# Patient Record
Sex: FEMALE | Race: WHITE | NOT HISPANIC OR LATINO | Employment: FULL TIME | URBAN - METROPOLITAN AREA
[De-identification: names, ages, dates, MRNs, and addresses within clinical notes are randomized per-mention and may not be internally consistent; named-entity substitution may affect disease eponyms.]

---

## 2017-10-02 ENCOUNTER — OFFICE VISIT (OUTPATIENT)
Dept: FAMILY MEDICINE | Facility: CLINIC | Age: 52
End: 2017-10-02
Payer: COMMERCIAL

## 2017-10-02 ENCOUNTER — HOSPITAL ENCOUNTER (OUTPATIENT)
Dept: RADIOLOGY | Facility: HOSPITAL | Age: 52
Discharge: HOME | End: 2017-10-02
Attending: NURSE PRACTITIONER
Payer: COMMERCIAL

## 2017-10-02 VITALS
DIASTOLIC BLOOD PRESSURE: 74 MMHG | HEIGHT: 59 IN | WEIGHT: 84.44 LBS | TEMPERATURE: 98 F | BODY MASS INDEX: 17.02 KG/M2 | SYSTOLIC BLOOD PRESSURE: 104 MMHG
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
## 2017-10-02 DIAGNOSIS — M79.644 PAIN OF RIGHT MIDDLE FINGER: ICD-10-CM

## 2017-10-02 DIAGNOSIS — S61.259A CAT BITE OF FINGER, INITIAL ENCOUNTER: ICD-10-CM

## 2017-10-02 DIAGNOSIS — S61.259A CAT BITE OF FINGER, INITIAL ENCOUNTER: Primary | ICD-10-CM

## 2017-10-02 PROBLEM — I10 HYPERTENSION: Status: ACTIVE | Noted: 2017-10-02

## 2017-10-02 PROBLEM — I25.2 MYOCARDIAL INFARCT, OLD: Status: ACTIVE | Noted: 2017-10-02

## 2017-10-02 PROCEDURE — 90471 IMMUNIZATION ADMIN: CPT | Mod: S$GLB,,, | Performed by: NURSE PRACTITIONER

## 2017-10-02 PROCEDURE — 73130 X-RAY EXAM OF HAND: CPT | Mod: TC,PO,RT

## 2017-10-02 PROCEDURE — 3008F BODY MASS INDEX DOCD: CPT | Mod: S$GLB,,, | Performed by: NURSE PRACTITIONER

## 2017-10-02 PROCEDURE — 99204 OFFICE O/P NEW MOD 45 MIN: CPT | Mod: 25,S$GLB,, | Performed by: NURSE PRACTITIONER

## 2017-10-02 PROCEDURE — 90715 TDAP VACCINE 7 YRS/> IM: CPT | Mod: S$GLB,,, | Performed by: NURSE PRACTITIONER

## 2017-10-02 PROCEDURE — 99999 PR PBB SHADOW E&M-NEW PATIENT-LVL IV: CPT | Mod: PBBFAC,,, | Performed by: NURSE PRACTITIONER

## 2017-10-02 PROCEDURE — 73130 X-RAY EXAM OF HAND: CPT | Mod: 26,RT,, | Performed by: RADIOLOGY

## 2017-10-02 RX ORDER — ATORVASTATIN CALCIUM 80 MG/1
TABLET, FILM COATED ORAL
Refills: 0 | COMMUNITY
Start: 2017-09-11 | End: 2018-02-01 | Stop reason: SDUPTHER

## 2017-10-02 RX ORDER — AMOXICILLIN AND CLAVULANATE POTASSIUM 875; 125 MG/1; MG/1
1 TABLET, FILM COATED ORAL EVERY 12 HOURS
Qty: 14 TABLET | Refills: 0 | Status: SHIPPED | OUTPATIENT
Start: 2017-10-02 | End: 2017-10-09

## 2017-10-02 RX ORDER — METOPROLOL SUCCINATE 25 MG/1
TABLET, EXTENDED RELEASE ORAL
Refills: 0 | COMMUNITY
Start: 2017-09-11 | End: 2018-02-01 | Stop reason: SDUPTHER

## 2017-10-02 RX ORDER — LANSOPRAZOLE 30 MG/1
CAPSULE, DELAYED RELEASE ORAL
Refills: 3 | COMMUNITY
Start: 2017-09-17 | End: 2018-02-01 | Stop reason: SDUPTHER

## 2017-10-02 RX ORDER — AMLODIPINE BESYLATE 5 MG/1
TABLET ORAL
Refills: 0 | COMMUNITY
Start: 2017-09-11 | End: 2018-02-01 | Stop reason: SDUPTHER

## 2017-10-02 NOTE — PATIENT INSTRUCTIONS
Cat Bite    A cat bite can cause a wound deep enough to break the skin. In such cases, the wound is cleaned and then sometimes closed. If the wound is closed it is usually not closed completely. This is so that fluid can drain if the wound becomes infected. Often the wound is left open to heal. In addition to wound care, a tetanus shot may be given, if needed.  Home care  · Wash your hands well with soap and warm water before and after caring for the wound. This helps lower the risk of infection.  · Care for the wound as directed. If a dressing was applied to the wound, be sure to change it as directed.  · If the wound bleeds, place a clean, soft cloth on the wound. Then firmly apply pressure until the bleeding stops. This may take up to 5 minutes. Do not release the pressure and look at the wound during this time.  · Always get medical attention for cat bites on the hand. They are highly likely to become infected.  · Most wounds heal within 10 days. But an infection can occur even with proper treatment. So be sure to check the wound daily for signs of infection (see below).  · Antibiotics may be prescribed. These help prevent or treat infection. If youre given antibiotics, take them as directed. Also be sure to complete the medicines.  Rabies prevention  Rabies is a virus that can be carried in certain animals. These can include domestic animals such as cats and dogs. Pets fully vaccinated against rabies (2 shots) are at very low risk of infection. But because human rabies is almost always fatal, any biting pet should be confined for 10 days as an extra precaution. In general, if there is a risk for rabies, the following steps may need to be taken:  · If someones pet cat has bitten you, it should be kept in a secure area for the next 10 days to watch for signs of illness. (If the pet owner wont allow this, contact your local animal control center.) If the cat becomes ill or dies during that time, contact your  local animal control center at once so the animal may be tested for rabies. If the cat stays healthy for the next 10 days, there is no danger of rabies in the animal or you.  · If a stray cat bit you, contact your local animal control center. They can give information on capture, quarantine, and animal rabies testing.  · If you cant find the animal that bit you in the next 2 days, and if rabies exists in your area, you may need to receive the rabies vaccine series. Call your healthcare provider right away. Or return to the emergency department promptly.  · All animal bites should be reported to the local animal control center. If you were not given a form to fill out, you can report this yourself.  Follow-up care  Follow up with your healthcare provider, or as directed.  When to seek medical advice  Call your healthcare provider right away if any of these occur:  · Signs of infection:  ¨ Spreading redness or warmth from the wound  ¨ Increased pain or swelling  ¨ Fever of 100.4ºF (38ºC) or higher, or as directed by your healthcare provider  ¨ Colored fluid or pus draining from the wound  ¨ Enlarged lymph nodes above the area that was bitten, such as lymph nodes in the armpit if you were bitten on the hand or arm. This may be a sign of cat-scratch disease (cat-scratch fever).  · Signs of rabies infection:  ¨ Headache  ¨ Confusion  ¨ Strange behavior  ¨ Increased salivating or drooling  ¨ Seizure  · Decreased ability to move any body part near the bite area  · Bleeding that can't be stopped after 5 minutes of firm pressure  Date Last Reviewed: 3/23/2015  © 3072-8728 [employer]. [address] [place] [address], [place] [address]. All rights reserved. This information is not intended as a substitute for professional medical care. Always follow your healthcare professional's instructions.      Encouraged to continue to use ibuprofen for discomfort as needed.   Continue to keep site clean.   Take antibiotic as  prescribed.  Return to clinic in 1 week for follow up or sooner if symptoms persist or worsen.   Will send xray results via patient portal.

## 2017-10-02 NOTE — PROGRESS NOTES
Subjective:       Patient ID: Dena Stanton is a 51 y.o. female.    New pt to me and [facility] services.   Reviewed medical, surgical, social, and family history.     Chief Complaint: Animal Bite  Pt reports her 4 month old baby kitten bite her right middle finger.  It happened either Thursday or Friday evening. Kitten has not been to the vet yet for any shots.  Pt reports, she has not been out with any other animals.  Kitten has been an inside cat her whole life.  Pt reports the dog was running through house and pt had the kitten in her hands and bit then ran to hide.  Pt reports she has been putting doctor tichenors and neosporin ointment to the area since it occurred. At 1 am she last took ibuprofen (2 tablets) 400mg.  Pt reports yesterday site was swollen and red. She noticed it was warm in the middle of the night. She also reports putting ice on the site last night.       Animal Bite    The incident occurred more than 2 days ago. The incident occurred at home. There is an injury to the right hand. There is an injury to the right long finger. The pain is mild. It is unlikely that a foreign body is present. Pertinent negatives include no chest pain, no numbness, no nausea, no vomiting and no cough. There have been no prior injuries to these areas. She is right-handed. Tetanus status: it'ds been a long time, probably hurracaine [place].        Vitals:    10/02/17 1008   BP: 104/74   Temp: 98.2 °F (36.8 °C)     Past Medical History:   Diagnosis Date    Hypertension     Myocardial infarction 2001     Review of Systems   Constitutional: Negative for chills and fever.   Respiratory: Negative for cough and shortness of breath.    Cardiovascular: Negative for chest pain.   Gastrointestinal: Negative for nausea and vomiting.   Musculoskeletal: Positive for joint swelling (right middle finger).   Skin: Positive for wound (2 puncture sites to underside of middle finger where kitten bite her. ).   Neurological: Negative  for numbness.               Objective:      Physical Exam   Constitutional: She is oriented to person, place, and time. Vital signs are normal. She appears well-developed and well-nourished. She is cooperative.   HENT:   Head: Normocephalic and atraumatic.   Right Ear: External ear normal.   Left Ear: External ear normal.   Ears:    Nose: Nose normal.   Mouth/Throat: Oropharynx is clear and moist and mucous membranes are normal.   Eyes: Conjunctivae, EOM and lids are normal. Pupils are equal, round, and reactive to light.   Neck: Normal range of motion. Neck supple.   Cardiovascular: Normal rate, regular rhythm and normal heart sounds.    Pulmonary/Chest: Effort normal and breath sounds normal.   Abdominal: Soft. Bowel sounds are normal.   Musculoskeletal: She exhibits edema (right middle finger) and tenderness (right middle finger).        Arms:       Right hand: She exhibits decreased range of motion, tenderness and swelling.        Hands:  Lymphadenopathy:     She has no cervical adenopathy.   Neurological: She is alert and oriented to person, place, and time.   Skin: Skin is warm and dry. Capillary refill takes less than 2 seconds.   Psychiatric: She has a normal mood and affect. Her speech is normal and behavior is normal. Judgment and thought content normal.   Nursing note and vitals reviewed.      Assessment & Plan:       Cat bite of finger, initial encounter  -     X-Ray Hand 3 view Right; Future; Expected date: 10/02/2017  -     amoxicillin-clavulanate 875-125mg (AUGMENTIN) 875-125 mg per tablet; Take 1 tablet by mouth every 12 (twelve) hours.  Dispense: 14 tablet; Refill: 0  -     Tdap Vaccine    Pain of right middle finger  -     X-Ray Hand 3 view Right; Future; Expected date: 10/02/2017  -     amoxicillin-clavulanate 875-125mg (AUGMENTIN) 875-125 mg per tablet; Take 1 tablet by mouth every 12 (twelve) hours.  Dispense: 14 tablet; Refill: 0    Encouraged to continue to use ibuprofen for discomfort as  needed.   Continue to keep site clean.   Take antibiotic as prescribed.  RTC in 1 week for follow up or sooner if symptoms persist or worsen.   Will send xray results via patient portal.   Pt verbalized understanding of plan of care      Return in about 1 week (around 10/9/2017).

## 2018-02-01 ENCOUNTER — OFFICE VISIT (OUTPATIENT)
Dept: FAMILY MEDICINE | Facility: CLINIC | Age: 53
End: 2018-02-01
Payer: COMMERCIAL

## 2018-02-01 ENCOUNTER — LAB VISIT (OUTPATIENT)
Dept: LAB | Facility: HOSPITAL | Age: 53
End: 2018-02-01
Attending: NURSE PRACTITIONER
Payer: COMMERCIAL

## 2018-02-01 VITALS
HEART RATE: 84 BPM | OXYGEN SATURATION: 97 % | SYSTOLIC BLOOD PRESSURE: 122 MMHG | HEIGHT: 59 IN | WEIGHT: 81.38 LBS | DIASTOLIC BLOOD PRESSURE: 82 MMHG | TEMPERATURE: 99 F | BODY MASS INDEX: 16.4 KG/M2

## 2018-02-01 DIAGNOSIS — F17.200 SMOKER: ICD-10-CM

## 2018-02-01 DIAGNOSIS — H61.23 BILATERAL IMPACTED CERUMEN: ICD-10-CM

## 2018-02-01 DIAGNOSIS — Z00.00 WELLNESS EXAMINATION: Primary | ICD-10-CM

## 2018-02-01 DIAGNOSIS — I10 ESSENTIAL HYPERTENSION: ICD-10-CM

## 2018-02-01 DIAGNOSIS — E78.5 HYPERLIPIDEMIA, UNSPECIFIED HYPERLIPIDEMIA TYPE: ICD-10-CM

## 2018-02-01 DIAGNOSIS — H91.93 DECREASED HEARING OF BOTH EARS: ICD-10-CM

## 2018-02-01 DIAGNOSIS — Z00.00 WELLNESS EXAMINATION: ICD-10-CM

## 2018-02-01 DIAGNOSIS — K21.9 GASTROESOPHAGEAL REFLUX DISEASE, ESOPHAGITIS PRESENCE NOT SPECIFIED: ICD-10-CM

## 2018-02-01 LAB
ALBUMIN SERPL BCP-MCNC: 3.8 G/DL
ALP SERPL-CCNC: 97 U/L
ALT SERPL W/O P-5'-P-CCNC: 24 U/L
ANION GAP SERPL CALC-SCNC: 10 MMOL/L
AST SERPL-CCNC: 67 U/L
BASOPHILS # BLD AUTO: 0.05 K/UL
BASOPHILS NFR BLD: 0.9 %
BILIRUB SERPL-MCNC: 0.7 MG/DL
BUN SERPL-MCNC: 11 MG/DL
CALCIUM SERPL-MCNC: 9 MG/DL
CHLORIDE SERPL-SCNC: 106 MMOL/L
CHOLEST SERPL-MCNC: 235 MG/DL
CHOLEST/HDLC SERPL: 2.2 {RATIO}
CO2 SERPL-SCNC: 26 MMOL/L
CREAT SERPL-MCNC: 0.6 MG/DL
DIFFERENTIAL METHOD: ABNORMAL
EOSINOPHIL # BLD AUTO: 0 K/UL
EOSINOPHIL NFR BLD: 0.7 %
ERYTHROCYTE [DISTWIDTH] IN BLOOD BY AUTOMATED COUNT: 13.2 %
EST. GFR  (AFRICAN AMERICAN): >60 ML/MIN/1.73 M^2
EST. GFR  (NON AFRICAN AMERICAN): >60 ML/MIN/1.73 M^2
GLUCOSE SERPL-MCNC: 83 MG/DL
HCT VFR BLD AUTO: 41.9 %
HDLC SERPL-MCNC: 105 MG/DL
HDLC SERPL: 44.7 %
HGB BLD-MCNC: 14.4 G/DL
IMM GRANULOCYTES # BLD AUTO: 0.01 K/UL
IMM GRANULOCYTES NFR BLD AUTO: 0.2 %
LDLC SERPL CALC-MCNC: 119.2 MG/DL
LYMPHOCYTES # BLD AUTO: 1.4 K/UL
LYMPHOCYTES NFR BLD: 23.8 %
MCH RBC QN AUTO: 33.6 PG
MCHC RBC AUTO-ENTMCNC: 34.4 G/DL
MCV RBC AUTO: 98 FL
MONOCYTES # BLD AUTO: 0.5 K/UL
MONOCYTES NFR BLD: 7.9 %
NEUTROPHILS # BLD AUTO: 3.8 K/UL
NEUTROPHILS NFR BLD: 66.5 %
NONHDLC SERPL-MCNC: 130 MG/DL
NRBC BLD-RTO: 0 /100 WBC
PLATELET # BLD AUTO: 271 K/UL
PMV BLD AUTO: 10.6 FL
POTASSIUM SERPL-SCNC: 4.3 MMOL/L
PROT SERPL-MCNC: 7.2 G/DL
RBC # BLD AUTO: 4.29 M/UL
SODIUM SERPL-SCNC: 142 MMOL/L
TRIGL SERPL-MCNC: 54 MG/DL
TSH SERPL DL<=0.005 MIU/L-ACNC: 1.99 UIU/ML
WBC # BLD AUTO: 5.72 K/UL

## 2018-02-01 PROCEDURE — 36415 COLL VENOUS BLD VENIPUNCTURE: CPT | Mod: PO

## 2018-02-01 PROCEDURE — 80053 COMPREHEN METABOLIC PANEL: CPT

## 2018-02-01 PROCEDURE — 99396 PREV VISIT EST AGE 40-64: CPT | Mod: S$GLB,,, | Performed by: NURSE PRACTITIONER

## 2018-02-01 PROCEDURE — 99999 PR PBB SHADOW E&M-EST. PATIENT-LVL V: CPT | Mod: PBBFAC,,, | Performed by: NURSE PRACTITIONER

## 2018-02-01 PROCEDURE — 85025 COMPLETE CBC W/AUTO DIFF WBC: CPT

## 2018-02-01 PROCEDURE — 80061 LIPID PANEL: CPT

## 2018-02-01 PROCEDURE — 84443 ASSAY THYROID STIM HORMONE: CPT

## 2018-02-01 RX ORDER — AMLODIPINE BESYLATE 5 MG/1
TABLET ORAL
Qty: 90 TABLET | Refills: 1 | Status: SHIPPED | OUTPATIENT
Start: 2018-02-01 | End: 2018-08-22 | Stop reason: SDUPTHER

## 2018-02-01 RX ORDER — METOPROLOL SUCCINATE 25 MG/1
TABLET, EXTENDED RELEASE ORAL
Qty: 90 TABLET | Refills: 1 | Status: SHIPPED | OUTPATIENT
Start: 2018-02-01 | End: 2018-08-22 | Stop reason: SDUPTHER

## 2018-02-01 RX ORDER — ATORVASTATIN CALCIUM 80 MG/1
TABLET, FILM COATED ORAL
Qty: 90 TABLET | Refills: 1 | Status: SHIPPED | OUTPATIENT
Start: 2018-02-01 | End: 2018-08-22 | Stop reason: SDUPTHER

## 2018-02-01 RX ORDER — LANSOPRAZOLE 30 MG/1
CAPSULE, DELAYED RELEASE ORAL
Qty: 90 CAPSULE | Refills: 1 | Status: SHIPPED | OUTPATIENT
Start: 2018-02-01 | End: 2018-10-18 | Stop reason: SDUPTHER

## 2018-02-01 NOTE — PROGRESS NOTES
Subjective:       Patient ID: Dena Stanton is a 52 y.o. female.  Reviewed pt's medical, surgical, family, and social hx.   Last seen on 10/2/2017.    Chief Complaint: Medication Refill (BP med)  Needs to establish care with PCP.  Here for med refill and labs. Pt reports her previous Md no longer practices.     HPI  Vitals:    02/01/18 1034   BP: 122/82   Pulse: 84   Temp: 98.6 °F (37 °C)     Review of Systems   Constitutional: Negative for activity change, chills, diaphoresis, fever and unexpected weight change.   HENT: Negative for congestion, ear discharge, ear pain, hearing loss, postnasal drip, rhinorrhea, sinus pain, sinus pressure, sneezing, sore throat, trouble swallowing and voice change.    Eyes: Negative for discharge and visual disturbance.   Respiratory: Negative for cough, chest tightness, shortness of breath and wheezing.         Smoker.   Cardiovascular: Negative for chest pain and palpitations.        Hx of HTN and Hyperlipidemia.   Pt reports had MI years ago.   Takes Norvasc, Toprol XL and Lipitor.   Gastrointestinal: Negative for abdominal distention, abdominal pain, blood in stool, constipation, diarrhea, nausea and vomiting.        Hx of GERD. Takes Prevacid.    Endocrine: Negative for polydipsia and polyuria.   Genitourinary: Negative for difficulty urinating, dysuria, hematuria and menstrual problem.   Musculoskeletal: Negative for arthralgias, joint swelling and neck pain.   Neurological: Negative for weakness and headaches.   Psychiatric/Behavioral: Negative for confusion and dysphoric mood.       Objective:      Physical Exam   Constitutional: She is oriented to person, place, and time. Vital signs are normal. She appears well-developed and well-nourished. She is cooperative.   HENT:   Head: Normocephalic and atraumatic.   Right Ear: External ear normal. Decreased hearing is noted.   Left Ear: External ear normal. Decreased hearing is noted.   Ears:    Nose: Nose normal.   Mouth/Throat:  Oropharynx is clear and moist and mucous membranes are normal.   Wears hearing aids bilaterally.   Eyes: Conjunctivae, EOM and lids are normal. Pupils are equal, round, and reactive to light.   Neck: Normal range of motion. Neck supple.   Cardiovascular: Normal rate, regular rhythm, S1 normal, S2 normal and normal heart sounds.    Pulmonary/Chest: Effort normal and breath sounds normal. No respiratory distress.   Abdominal: Soft. Bowel sounds are normal. She exhibits no distension.   Musculoskeletal: Normal range of motion.   Lymphadenopathy:        Head (right side): No submental, no submandibular, no tonsillar, no preauricular and no posterior auricular adenopathy present.        Head (left side): No submental, no submandibular, no tonsillar, no preauricular and no posterior auricular adenopathy present.   Neurological: She is alert and oriented to person, place, and time.   Skin: Skin is warm, dry and intact. Capillary refill takes less than 2 seconds.   Psychiatric: She has a normal mood and affect. Her speech is normal and behavior is normal. Judgment and thought content normal.   Nursing note and vitals reviewed.      Assessment & Plan:       Wellness examination  -     CBC auto differential; Future; Expected date: 02/01/2018  -     Lipid panel; Future; Expected date: 02/01/2018  -     TSH; Future; Expected date: 02/01/2018  -     Comprehensive metabolic panel; Future; Expected date: 02/01/2018    Essential hypertension  -     metoprolol succinate (TOPROL-XL) 25 MG 24 hr tablet; Take one tablet by mouth once a day.  Dispense: 90 tablet; Refill: 1  -     amLODIPine (NORVASC) 5 MG tablet; Take one tablet by mouth once a day.  Dispense: 90 tablet; Refill: 1    Gastroesophageal reflux disease, esophagitis presence not specified  -     lansoprazole (PREVACID) 30 MG capsule; Take one capsule by mouth once a day.  Dispense: 90 capsule; Refill: 1    Hyperlipidemia, unspecified hyperlipidemia type  -     atorvastatin  (LIPITOR) 80 MG tablet; Take one tablet by mouth every pm.  Dispense: 90 tablet; Refill: 1    Smoker  -     Ambulatory referral to Smoking Cessation Program  Pt wants help to quit smoking.     Bilateral impacted cerumen  -     Ambulatory referral to ENT    Decreased hearing of both ears  -     Ambulatory referral to ENT  Wears hearing aids bilaterally.     Reviewed health maintenance, pt declined flu vaccine, colonoscopy, GYN referral, pap smear, and mammogram at this time.  Encouraged to get labs today, will call pt with results.   Make ENT appointment.   Est. care with [facility] in [place], per pt request.       Follow-up if symptoms worsen or fail to improve.

## 2018-02-05 ENCOUNTER — TELEPHONE (OUTPATIENT)
Dept: FAMILY MEDICINE | Facility: CLINIC | Age: 53
End: 2018-02-05

## 2018-02-05 DIAGNOSIS — E78.5 HYPERLIPIDEMIA, UNSPECIFIED HYPERLIPIDEMIA TYPE: Primary | ICD-10-CM

## 2018-02-05 DIAGNOSIS — H91.90 HEARING DIFFICULTY, UNSPECIFIED LATERALITY: Primary | ICD-10-CM

## 2018-02-05 NOTE — TELEPHONE ENCOUNTER
Please notify pt of lab results, total cholesterol is elevated at 235, normal range is less than 199. Other labs are within acceptable range. Recommend lifestyle modification, diet, exercise, and smoking cessation. Continue current medications and repeat lipid panel in 2 months, order placed.

## 2018-02-06 ENCOUNTER — TELEPHONE (OUTPATIENT)
Dept: FAMILY MEDICINE | Facility: CLINIC | Age: 53
End: 2018-02-06

## 2018-02-06 NOTE — TELEPHONE ENCOUNTER
----- Message from Vandana Wong sent at 2/5/2018  4:21 PM CST -----  Contact: Patient  Dena, Patient [phone], Returning the nurse's call, lab test results. Please advise. Thanks.

## 2018-02-06 NOTE — TELEPHONE ENCOUNTER
----- Message from Vandana Wong sent at 2/5/2018  4:21 PM CST -----  Contact: Patient  Dena, Patient [phone], Returning the nurse's call, lab test results. Please advise. Thanks.

## 2018-02-07 ENCOUNTER — CLINICAL SUPPORT (OUTPATIENT)
Dept: AUDIOLOGY | Facility: CLINIC | Age: 53
End: 2018-02-07
Payer: COMMERCIAL

## 2018-02-07 ENCOUNTER — OFFICE VISIT (OUTPATIENT)
Dept: OTOLARYNGOLOGY | Facility: CLINIC | Age: 53
End: 2018-02-07
Payer: COMMERCIAL

## 2018-02-07 VITALS
HEART RATE: 80 BPM | WEIGHT: 81.38 LBS | HEIGHT: 59 IN | BODY MASS INDEX: 16.4 KG/M2 | SYSTOLIC BLOOD PRESSURE: 123 MMHG | DIASTOLIC BLOOD PRESSURE: 71 MMHG

## 2018-02-07 DIAGNOSIS — H90.0 CONDUCTIVE HEARING LOSS OF BOTH EARS: Primary | ICD-10-CM

## 2018-02-07 DIAGNOSIS — Z97.4 WEARS HEARING AID: ICD-10-CM

## 2018-02-07 DIAGNOSIS — H61.301 ACQUIRED STENOSIS OF RIGHT EXTERNAL EAR CANAL: ICD-10-CM

## 2018-02-07 DIAGNOSIS — H61.23 BILATERAL IMPACTED CERUMEN: ICD-10-CM

## 2018-02-07 DIAGNOSIS — H90.6 MIXED HEARING LOSS, BILATERAL: Primary | ICD-10-CM

## 2018-02-07 DIAGNOSIS — H74.03 TS (TYMPANOSCLEROSIS), BILATERAL: ICD-10-CM

## 2018-02-07 DIAGNOSIS — H93.13 TINNITUS, BILATERAL: ICD-10-CM

## 2018-02-07 PROCEDURE — G0268 REMOVAL OF IMPACTED WAX MD: HCPCS | Mod: S$GLB,,, | Performed by: NURSE PRACTITIONER

## 2018-02-07 PROCEDURE — 3008F BODY MASS INDEX DOCD: CPT | Mod: S$GLB,,, | Performed by: NURSE PRACTITIONER

## 2018-02-07 PROCEDURE — 99203 OFFICE O/P NEW LOW 30 MIN: CPT | Mod: 25,S$GLB,, | Performed by: NURSE PRACTITIONER

## 2018-02-07 PROCEDURE — 99999 PR PBB SHADOW E&M-EST. PATIENT-LVL III: CPT | Mod: PBBFAC,,, | Performed by: NURSE PRACTITIONER

## 2018-02-07 PROCEDURE — 99999 PR PBB SHADOW E&M-EST. PATIENT-LVL I: CPT | Mod: PBBFAC,,,

## 2018-02-07 PROCEDURE — 92567 TYMPANOMETRY: CPT | Mod: S$GLB,,, | Performed by: AUDIOLOGIST-HEARING AID FITTER

## 2018-02-07 PROCEDURE — 92557 COMPREHENSIVE HEARING TEST: CPT | Mod: S$GLB,,, | Performed by: AUDIOLOGIST-HEARING AID FITTER

## 2018-02-07 NOTE — PROGRESS NOTES
Dena Stanton was seen 02/07/2018 for an audiological evaluation. Patient complains of hearing loss. 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 She had a tympanoplasty in 1973. She began wearing hearing aids in 1991. Her grandmother gave her a set of hearing aids that were reprogrammed for her hearing loss but she is interested in getting new hearing aids. 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 She is also interested in discussing surgical options to determine if her hearing can be restored, at least partially.     Results reveal a severe-to-profound mixed hearing loss for the right ear, and  mild-to-moderately severe mixed hearing loss for the left ear.    Speech Reception Thresholds were  70 dBHL for the right ear and 35 dBHL for the left ear.    Word recognition scores were good for the right ear and good for the left ear.   Tympanograms were Type B for the right ear and Type B for the left ear.    Audiogram results were reviewed in detail with patient and all questions were answered. Results will be reviewed by ENT at the completion of this note. Recommend further medical eval for the asymmetry and air/bone gap, binaural amplification pending medical clearance, annual hearing tests to monitor hearing and hearing protection in loud noise. Pt is interested in getting new hearing aids and will call the clinic to schedule a HA consult.

## 2018-02-07 NOTE — LETTER
February 7, 2018      Angie Lazcano NP  [address] [facility] [address] [place] [address]           [facility]  [address] [facility] [address] [place] [address]  Phone: [phone]  Fax: [phone]          Patient: Dena Stanton   MR Number: [id number]   YOB: 1965   Date of Visit: 2/7/2018       Dear Angie Lazcano:    Thank you for referring Dena Stanton to me for evaluation. Attached you will find relevant portions of my assessment and plan of care.    If you have questions, please do not hesitate to call me. I look forward to following Dena Stanton along with you.    Sincerely,    Sharonda Wills NP    Enclosure  CC:  No Recipients    If you would like to receive this communication electronically, please contact [email or web address] or [phone] to request more information on [employer] [facility] access.    For providers and/or their staff who would like to refer a patient to [facility], please contact us through our one-stop-shop provider referral line, [facility] , at [phone].    If you feel you have received this communication in error or would no longer like to receive these types of communications, please e-mail [email or web address]

## 2018-02-07 NOTE — PROGRESS NOTES
Subjective:       Patient ID: Dena Stanton is a 52 y.o. female.    Chief Complaint: Cerumen Impaction and Hearing Loss    HPI   Patient is referred by DARIA Lazcano for consultation for hearing loss. Patient had lots of ear infections as a child, h/o perforated TMs, h/o tympanoplasties in 1973. Patient has worn hearing aids since 1991. H/o noise exposure (rock concerts). (+)Tinnitus. Significant family h/o HL. She works in a library, hard to hear patrons whisper. She is here to see about getting new hearing aids and to establish care. She is also interested if there is any surgery which would improve her hearing.     Review of Systems   Constitutional: Negative.    HENT: Positive for hearing loss and tinnitus.    Eyes: Negative.    Respiratory: Negative.    Cardiovascular: Negative.    Gastrointestinal: Negative.    Musculoskeletal: Negative.    Skin: Negative.    Neurological: Negative.    Hematological: Negative.    Psychiatric/Behavioral: Negative.        Objective:      Physical Exam   Constitutional: She is oriented to person, place, and time. Vital signs are normal. She appears well-developed and well-nourished. She is cooperative. She does not appear ill. No distress.   HENT:   Head: Normocephalic and atraumatic.   Right Ear: External ear and ear canal normal. No drainage, swelling or tenderness. Tympanic membrane is scarred (atresia/stenosis of EAC with no visible TM landmarks). Tympanic membrane is not erythematous. Decreased hearing is noted.   Left Ear: External ear and ear canal normal. No drainage, swelling or tenderness. Tympanic membrane is scarred. Tympanic membrane is not erythematous.  No middle ear effusion. Decreased hearing is noted.   Nose: Nose normal. No mucosal edema or rhinorrhea. Right sinus exhibits no maxillary sinus tenderness and no frontal sinus tenderness. Left sinus exhibits no maxillary sinus tenderness and no frontal sinus tenderness.   Mouth/Throat: Uvula is midline, oropharynx is  clear and moist and mucous membranes are normal. Mucous membranes are not pale, not dry and not cyanotic. No oral lesions. No oropharyngeal exudate, posterior oropharyngeal edema or posterior oropharyngeal erythema.     SEPARATE PROCEDURE IN OFFICE:   Procedure: Removal of impacted cerumen, bilateral   Pre Procedure Diagnosis: Cerumen Impaction   Post Procedure Diagnosis: Cerumen Impaction   Verbal informed consent in regards to risk of trauma to ear canal, ear drum or hearing, discomfort during procedure and/or inability to remove cerumen impaction in one session or unforeseen events or complications.   No anesthesia.     Procedure in detail:   Ear canal visualized bilateral with appropriate size ear speculum utilizing Operating Head Binocular Otomicroscope   Utilizing the following: Ring curet, delicate alligator forceps AU. The impacted cerumen of the ear canals was removed atraumatically. The TM and EAC were then inspected and found to be clear of wax. See description of TMs/EACs in PE above.   Complications: No   Condition: Improved/Good     Eyes: Conjunctivae, EOM and lids are normal. Pupils are equal, round, and reactive to light. Right eye exhibits no discharge. Left eye exhibits no discharge. No scleral icterus.   Neck: Trachea normal and normal range of motion. Neck supple. No tracheal deviation present. No thyroid mass and no thyromegaly present.   Cardiovascular: Normal rate.    Pulmonary/Chest: Effort normal. No stridor. No respiratory distress. She has no wheezes.   Musculoskeletal: Normal range of motion.   Lymphadenopathy:        Head (right side): No submental, no submandibular, no tonsillar, no preauricular and no posterior auricular adenopathy present.        Head (left side): No submental, no submandibular, no tonsillar, no preauricular and no posterior auricular adenopathy present.     She has no cervical adenopathy.        Right cervical: No superficial cervical and no posterior cervical  adenopathy present.       Left cervical: No superficial cervical and no posterior cervical adenopathy present.   Neurological: She is alert and oriented to person, place, and time. She has normal strength. Coordination and gait normal.   Skin: Skin is warm, dry and intact. No lesion and no rash noted. She is not diaphoretic. No cyanosis. No pallor.   Psychiatric: She has a normal mood and affect. Her speech is normal and behavior is normal. Judgment and thought content normal. Cognition and memory are normal.   Nursing note and vitals reviewed.      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 Referred to Dr. Hernandez for eval.     Continue daily use of binaural amplification.

## 2018-02-26 ENCOUNTER — OFFICE VISIT (OUTPATIENT)
Dept: FAMILY MEDICINE | Facility: CLINIC | Age: 53
End: 2018-02-26
Payer: COMMERCIAL

## 2018-02-26 VITALS
WEIGHT: 83.25 LBS | HEIGHT: 59 IN | DIASTOLIC BLOOD PRESSURE: 82 MMHG | BODY MASS INDEX: 16.78 KG/M2 | OXYGEN SATURATION: 98 % | SYSTOLIC BLOOD PRESSURE: 138 MMHG | TEMPERATURE: 99 F | HEART RATE: 82 BPM

## 2018-02-26 DIAGNOSIS — I10 ESSENTIAL HYPERTENSION: Primary | ICD-10-CM

## 2018-02-26 DIAGNOSIS — E78.5 HYPERLIPIDEMIA, UNSPECIFIED HYPERLIPIDEMIA TYPE: ICD-10-CM

## 2018-02-26 DIAGNOSIS — Z00.00 PREVENTATIVE HEALTH CARE: ICD-10-CM

## 2018-02-26 DIAGNOSIS — K21.9 GASTROESOPHAGEAL REFLUX DISEASE, ESOPHAGITIS PRESENCE NOT SPECIFIED: ICD-10-CM

## 2018-02-26 DIAGNOSIS — I25.2 MYOCARDIAL INFARCT, OLD: ICD-10-CM

## 2018-02-26 DIAGNOSIS — F17.200 SMOKER: ICD-10-CM

## 2018-02-26 PROCEDURE — 99999 PR PBB SHADOW E&M-EST. PATIENT-LVL III: CPT | Mod: PBBFAC,,, | Performed by: FAMILY MEDICINE

## 2018-02-26 PROCEDURE — 99214 OFFICE O/P EST MOD 30 MIN: CPT | Mod: S$GLB,,, | Performed by: FAMILY MEDICINE

## 2018-02-26 PROCEDURE — 3008F BODY MASS INDEX DOCD: CPT | Mod: S$GLB,,, | Performed by: FAMILY MEDICINE

## 2018-02-26 RX ORDER — EZETIMIBE 10 MG/1
10 TABLET ORAL DAILY
Qty: 90 TABLET | Refills: 3 | Status: SHIPPED | OUTPATIENT
Start: 2018-02-26 | End: 2018-08-22

## 2018-02-26 NOTE — LETTER
February 26, 2018      Angie Lazcano NP  [address] [facility] [address]  [place] [address]           [facility]  [address]  [place] [address]  Phone: [phone]  Fax: [phone]          Patient: Dena Stanton   MR Number: [id number]   YOB: 1965   Date of Visit: 2/26/2018       Dear Angie Lazcano:    Thank you for referring Dena Stanton to me for evaluation. Attached you will find relevant portions of my assessment and plan of care.    If you have questions, please do not hesitate to call me. I look forward to following Dena Stanton along with you.    Sincerely,    Rafael Masters MD    Enclosure  CC:  No Recipients    If you would like to receive this communication electronically, please contact [email or web address] or [phone] to request more information on [employer] Link access.    For providers and/or their staff who would like to refer a patient to [facility], please contact us through our one-stop-shop provider referral line, [facility], at [phone].    If you feel you have received this communication in error or would no longer like to receive these types of communications, please e-mail [email or web address]

## 2018-02-26 NOTE — PROGRESS NOTES
Subjective:     THIS DOCUMENT WAS MADE IN PART WITH [employer] DICTATION SOFTWARE. OCCASIONALLY THIS SOFTWARE MAY MISINTERPRET WORDS OR PHRASES.     Patient ID: Dena Stanton is a 52 y.o. female.    Chief Complaint: Establish Care    HPI      Patient is scheduled today to establish care. This patient is new to me. I have reviewed her chart and the summarized her active problems below. I reviewed her recent wellness exams and recent labs.    Essential hypertension   chronic condition, a little high today probably from anxiety. The last several readings have been very good and home readings have been good. She remains on amlodipine and metoprolol    Hyperlipidemia, unspecified hyperlipidemia type   Recent labs reviewed. She has excellent HDL, greater than 100. She also remains on Lipitor 80 mg daily   she also reports that she had been on Zetia as well but has not been on this recently when prescription ran out    Gastroesophageal reflux disease, esophagitis presence not specified  Stable on prevacid    Smoker  Would like patches and wellbutrin  Scheduled for clinic  Quit for 10 years after MI    Myocardial infarct, old  2001, angioplasty at that time   states her arteries were too small for a stent. Has had multiple stress test since that were normal. She does not have any active angina. Previously followed by Dr. Sepulveda      Active Ambulatory Problems     Diagnosis Date Noted    Hypertension 10/02/2017    Myocardial infarct, old 10/02/2017     Resolved Ambulatory Problems     Diagnosis Date Noted    Cat bite of finger 10/02/2017     Past Medical History:   Diagnosis Date    GERD (gastroesophageal reflux disease)     Hearing impaired person, bilateral     Hyperlipidemia     Hypertension     Myocardial infarction 2001     Current Outpatient Prescriptions on File Prior to Visit   Medication Sig Dispense Refill    amLODIPine (NORVASC) 5 MG tablet Take one tablet by mouth once a day. 90 tablet 1    atorvastatin  (LIPITOR) 80 MG tablet Take one tablet by mouth every pm. 90 tablet 1    lansoprazole (PREVACID) 30 MG capsule Take one capsule by mouth once a day. 90 capsule 1    metoprolol succinate (TOPROL-XL) 25 MG 24 hr tablet Take one tablet by mouth once a day. 90 tablet 1     No current facility-administered medications on file prior to visit.      Review of patient's allergies indicates:  No Known Allergies  Social History   Substance Use Topics    Smoking status: Current Every Day Smoker     Packs/day: 1.00     Years: 5.00     Types: Cigarettes    Smokeless tobacco: Never Used    Alcohol use 1.8 oz/week     3 Glasses of wine per week      Comment: pt states about 3 glasses of wine per week         Review of Systems   Constitutional: Negative for fatigue, fever and unexpected weight change.   HENT: Negative for sinus pressure and trouble swallowing.    Eyes: Negative for pain and visual disturbance.   Respiratory: Negative for cough, chest tightness and shortness of breath.    Cardiovascular: Negative for chest pain, palpitations and leg swelling.   Gastrointestinal: Negative for abdominal pain, blood in stool, constipation, diarrhea, nausea and vomiting.   Genitourinary: Negative for dysuria, frequency and hematuria.   Musculoskeletal: Negative for arthralgias, gait problem, myalgias and neck pain.   Skin: Negative for rash and wound.   Neurological: Negative for dizziness, tremors, syncope, numbness and headaches.   Psychiatric/Behavioral: Negative for dysphoric mood and sleep disturbance. The patient is not nervous/anxious.        Objective:      Physical Exam   Constitutional: She is oriented to person, place, and time. She appears well-developed and well-nourished. No distress.   HENT:   Head: Normocephalic and atraumatic.   Eyes: No scleral icterus.   Cardiovascular: Normal rate, regular rhythm and normal heart sounds.    No murmur heard.  Pulmonary/Chest: Effort normal and breath sounds normal. No respiratory  "distress.   Neurological: She is alert and oriented to person, place, and time.   Skin: She is not diaphoretic.   Psychiatric: She has a normal mood and affect. Her behavior is normal.   Vitals reviewed.      Vitals:    02/26/18 1409 02/26/18 1434   BP: (!) 154/84 138/82   BP Location: Left arm    Patient Position: Sitting    BP Method: Small (Manual)    Pulse: 82    Temp: 98.9 °F (37.2 °C)    TempSrc: Oral    SpO2: 98%    Weight: 37.8 kg (83 lb 3.6 oz)    Height: 4' 11" (1.499 m)      Results for orders placed or performed in visit on 02/01/18   CBC auto differential   Result Value Ref Range    WBC 5.72 3.90 - 12.70 K/uL    RBC 4.29 4.00 - 5.40 M/uL    Hemoglobin 14.4 12.0 - 16.0 g/dL    Hematocrit 41.9 37.0 - 48.5 %    MCV 98 82 - 98 fL    MCH 33.6 (H) 27.0 - 31.0 pg    MCHC 34.4 32.0 - 36.0 g/dL    RDW 13.2 11.5 - 14.5 %    Platelets 271 150 - 350 K/uL    MPV 10.6 9.2 - 12.9 fL    Immature Granulocytes 0.2 0.0 - 0.5 %    Gran # (ANC) 3.8 1.8 - 7.7 K/uL    Immature Grans (Abs) 0.01 0.00 - 0.04 K/uL    Lymph # 1.4 1.0 - 4.8 K/uL    Mono # 0.5 0.3 - 1.0 K/uL    Eos # 0.0 0.0 - 0.5 K/uL    Baso # 0.05 0.00 - 0.20 K/uL    nRBC 0 0 /100 WBC    Gran% 66.5 38.0 - 73.0 %    Lymph% 23.8 18.0 - 48.0 %    Mono% 7.9 4.0 - 15.0 %    Eosinophil% 0.7 0.0 - 8.0 %    Basophil% 0.9 0.0 - 1.9 %    Differential Method Automated    Lipid panel   Result Value Ref Range    Cholesterol 235 (H) 120 - 199 mg/dL    Triglycerides 54 30 - 150 mg/dL     (H) 40 - 75 mg/dL    LDL Cholesterol 119.2 63.0 - 159.0 mg/dL    HDL/Chol Ratio 44.7 20.0 - 50.0 %    Total Cholesterol/HDL Ratio 2.2 2.0 - 5.0    Non-HDL Cholesterol 130 mg/dL   TSH   Result Value Ref Range    TSH 1.986 0.400 - 4.000 uIU/mL   Comprehensive metabolic panel   Result Value Ref Range    Sodium 142 136 - 145 mmol/L    Potassium 4.3 3.5 - 5.1 mmol/L    Chloride 106 95 - 110 mmol/L    CO2 26 23 - 29 mmol/L    Glucose 83 70 - 110 mg/dL    BUN, Bld 11 6 - 20 mg/dL    Creatinine " 0.6 0.5 - 1.4 mg/dL    Calcium 9.0 8.7 - 10.5 mg/dL    Total Protein 7.2 6.0 - 8.4 g/dL    Albumin 3.8 3.5 - 5.2 g/dL    Total Bilirubin 0.7 0.1 - 1.0 mg/dL    Alkaline Phosphatase 97 55 - 135 U/L    AST 67 (H) 10 - 40 U/L    ALT 24 10 - 44 U/L    Anion Gap 10 8 - 16 mmol/L    eGFR if African American >60.0 >60 mL/min/1.73 m^2    eGFR if non African American >60.0 >60 mL/min/1.73 m^2       Assessment:       1. Essential hypertension    2. Hyperlipidemia, unspecified hyperlipidemia type    3. Gastroesophageal reflux disease, esophagitis presence not specified    4. Smoker    5. Myocardial infarct, old    6. Preventative health care        Plan:       Dena was seen today for establish care.    Diagnoses and all orders for this visit:    Essential hypertension  -     Comprehensive metabolic panel; Future    Hyperlipidemia, unspecified hyperlipidemia type  -     ezetimibe (ZETIA) 10 mg tablet; Take 1 tablet (10 mg total) by mouth once daily.  -     Lipid panel; Future    Gastroesophageal reflux disease, esophagitis presence not specified   Stable, symptoms currently controlled. Recommend that she take a magnesium supplement periodically as she is on a PPI. Should consider an EGD at some point, but today she's not ready to schedule any endoscopies for this or for colon cancer screening    Smoker   She is ready to quit smoking and has an appointment with the smoking cessation clinic    Myocardial infarct, old   reviewed history, no active symptoms.    Preventative health care  -     Comprehensive metabolic panel; Future  -     Lipid panel; Future  -     Fecal Immunochemical Test (iFOBT); Future     she declines colonoscopy. She declines most vaccinations, I have reviewed her recent notes.        if stable follow with me in six months

## 2018-02-26 NOTE — PATIENT INSTRUCTIONS
Vit D 1000 IU daily  Diet rich in calcium  Magnesium supplement 200 mg few times a week    Fit Kit instructions:    1.Verify that your name and date of birth are correct on the label.    2. Place either the collection paper inside the toilet bowl on top of the water OR if supplied with a white specimen hat, place that to the rear of the toilet.    2. Open the green cap by lifting and twisting off the collection bottle.    3. Collect the stool off the paper before paper sinks or the sample gets wet with the green stick probe (from the collection bottle) by scraping the surface.  If using the specimen hat, collect the sample using the same method.  MAKE SURE THE GROOVED PORTION OF THE STICK IS COMPLETELY COVERED WITH THE STOOL SAMPLE.    4. Close the sample bottle by placing the green stick probe, that you collected the sample with in to to bottle that you removed it from.   Make sure the cap is on tightly.  DO NOT REOPEN.    5. Wrap the sample bottle in the absorbent pad and place it in the plastic bag provided in the package.    6. Mailing instructions:   A. Make sure that you write the stool collection date on the paperwork before placing it in to the envelope provided with the plastic bag that contains the stool sample.      B. Fold the flap at the pre-folded line.     C. Peel the tape from the flap.     D. Press firmly to seal the envelope.      E. MAIL AS SOON AS POSSIBLE AND WITHIN 24 HOURS OR THE SAMPLE     WILL NOT BE ABLE TO BE PROCESSED.

## 2018-03-01 ENCOUNTER — CLINICAL SUPPORT (OUTPATIENT)
Dept: SMOKING CESSATION | Facility: CLINIC | Age: 53
End: 2018-03-01
Payer: COMMERCIAL

## 2018-03-01 DIAGNOSIS — F17.210 MODERATE SMOKER (20 OR LESS PER DAY): Primary | ICD-10-CM

## 2018-03-01 PROCEDURE — 99999 PR PBB SHADOW E&M-EST. PATIENT-LVL II: CPT | Mod: PBBFAC,,,

## 2018-03-01 PROCEDURE — 99404 PREV MED CNSL INDIV APPRX 60: CPT | Mod: S$GLB,,, | Performed by: GENERAL PRACTICE

## 2018-03-01 RX ORDER — IBUPROFEN 200 MG
1 TABLET ORAL DAILY
Qty: 14 PATCH | Refills: 0 | Status: SHIPPED | OUTPATIENT
Start: 2018-03-01 | End: 2018-03-15 | Stop reason: DRUGHIGH

## 2018-03-01 RX ORDER — BUPROPION HYDROCHLORIDE 150 MG/1
TABLET, EXTENDED RELEASE ORAL
Qty: 53 TABLET | Refills: 0 | Status: SHIPPED | OUTPATIENT
Start: 2018-03-01 | End: 2018-03-29 | Stop reason: DRUGHIGH

## 2018-03-08 ENCOUNTER — CLINICAL SUPPORT (OUTPATIENT)
Dept: SMOKING CESSATION | Facility: CLINIC | Age: 53
End: 2018-03-08
Payer: COMMERCIAL

## 2018-03-08 DIAGNOSIS — F17.210 MODERATE SMOKER (20 OR LESS PER DAY): Primary | ICD-10-CM

## 2018-03-08 PROCEDURE — 99403 PREV MED CNSL INDIV APPRX 45: CPT | Mod: S$GLB,,, | Performed by: GENERAL PRACTICE

## 2018-03-08 PROCEDURE — 99999 PR PBB SHADOW E&M-EST. PATIENT-LVL II: CPT | Mod: PBBFAC,,,

## 2018-03-08 NOTE — Clinical Note
Patient is currently smoking 10 cigarettes per day and taking Wellbutrin and using the 21 mg patch with no negative side effects at this time. We discussed habitual behaviors at this time as well as session 1 material. Patient finds Wellbutrin and patches very effective.

## 2018-03-08 NOTE — PROGRESS NOTES
Individual Follow-Up Form    3/8/2018    Quit Date:     Clinical Status of Patient: Outpatient    Length of Service: 45 minutes    Continuing Medication: yes  Wellbutrin    Other Medications: patches     Target Symptoms: Withdrawal and medication side effects. The following were  rated moderate (3) to severe (4) on TCRS:  · Moderate (3): none  · Severe (4): none    Comments: Patient is currently smoking 10 cigarettes per day and taking Wellbutrin and using the 21 mg patch with no negative side effects at this time. We discussed habitual behaviors at this time as well as session 1 material. Patient finds Wellbutrin and patches very effective.     Diagnosis: F17.210    Next Visit: 1 week

## 2018-03-15 ENCOUNTER — CLINICAL SUPPORT (OUTPATIENT)
Dept: SMOKING CESSATION | Facility: CLINIC | Age: 53
End: 2018-03-15
Payer: COMMERCIAL

## 2018-03-15 DIAGNOSIS — F17.210 MODERATE SMOKER (20 OR LESS PER DAY): Primary | ICD-10-CM

## 2018-03-15 PROCEDURE — 99403 PREV MED CNSL INDIV APPRX 45: CPT | Mod: S$GLB,,, | Performed by: GENERAL PRACTICE

## 2018-03-15 PROCEDURE — 99999 PR PBB SHADOW E&M-EST. PATIENT-LVL II: CPT | Mod: PBBFAC,,,

## 2018-03-15 RX ORDER — IBUPROFEN 200 MG
1 TABLET ORAL DAILY
Qty: 14 PATCH | Refills: 0 | Status: SHIPPED | OUTPATIENT
Start: 2018-03-15 | End: 2018-03-29 | Stop reason: SDUPTHER

## 2018-03-15 NOTE — Clinical Note
Patient is currently smoking 1 cigarette per day and taking Wellbutrin and using a 21 mg patch with no negative side effects at this time. Patient had issues sleeping due to leaving patch on at night but will take it off before bed. Patient states she no longer craves nicotine and is very pleased with Wellbutrin and patches. I will order the 14 mg patch at this time. We discussed health and medications as well as side effects. We also discussed habitual behaviors at this time.

## 2018-03-15 NOTE — PROGRESS NOTES
Individual Follow-Up Form    3/15/2018    Quit Date:     Clinical Status of Patient: Outpatient    Length of Service: 45 minutes    Continuing Medication: yes  Wellbutrin    Other Medications: patches     Target Symptoms: Withdrawal and medication side effects. The following were  rated moderate (3) to severe (4) on TCRS:  · Moderate (3): none  · Severe (4): none    Comments: Patient is currently smoking 1 cigarette per day and taking Wellbutrin and using a 21 mg patch with no negative side effects at this time. Patient had issues sleeping due to leaving patch on at night but will take it off before bed. Patient states she no longer craves nicotine and is very pleased with Wellbutrin and patches. I will order the 14 mg patch at this time. We discussed health and medications as well as side effects. We also discussed habitual behaviors at this time.    Diagnosis: F17.200    Next Visit: 2 weeks

## 2018-03-27 ENCOUNTER — TELEPHONE (OUTPATIENT)
Dept: SMOKING CESSATION | Facility: CLINIC | Age: 53
End: 2018-03-27

## 2018-03-27 NOTE — TELEPHONE ENCOUNTER
Patient left me message that she has not taken Wellbutrin or used patches in a few days as she is not feeling well and is currently tobacco free and not craving nicotine. I will meet with patient 3/29/18.

## 2018-03-29 ENCOUNTER — CLINICAL SUPPORT (OUTPATIENT)
Dept: SMOKING CESSATION | Facility: CLINIC | Age: 53
End: 2018-03-29
Payer: COMMERCIAL

## 2018-03-29 DIAGNOSIS — F17.200 TOBACCO USE DISORDER: Primary | ICD-10-CM

## 2018-03-29 DIAGNOSIS — F17.210 MODERATE SMOKER (20 OR LESS PER DAY): ICD-10-CM

## 2018-03-29 PROCEDURE — 99403 PREV MED CNSL INDIV APPRX 45: CPT | Mod: S$GLB,,, | Performed by: GENERAL PRACTICE

## 2018-03-29 PROCEDURE — 99999 PR PBB SHADOW E&M-EST. PATIENT-LVL II: CPT | Mod: PBBFAC,,,

## 2018-03-29 RX ORDER — IBUPROFEN 200 MG
1 TABLET ORAL DAILY
Qty: 14 PATCH | Refills: 0 | Status: SHIPPED | OUTPATIENT
Start: 2018-03-29 | End: 2018-04-17

## 2018-03-29 RX ORDER — BUPROPION HYDROCHLORIDE 150 MG/1
150 TABLET, EXTENDED RELEASE ORAL 2 TIMES DAILY
Qty: 60 TABLET | Refills: 0 | Status: SHIPPED | OUTPATIENT
Start: 2018-03-29 | End: 2018-04-17

## 2018-03-29 NOTE — Clinical Note
Patient remains tobacco free and taking Wellbutrin and using a 14 mg patch with no negative side effects at this time. Patient taking 150 mg QD due to loss of appetite. Patient states Wellbutrin and patches are very helpful as she has no urges or cravings for nicotine. We discussed session 3 material at this time.

## 2018-03-29 NOTE — PROGRESS NOTES
Individual Follow-Up Form    3/29/2018    Quit Date: 3/17/18    Clinical Status of Patient: Outpatient    Length of Service: 45 minutes    Continuing Medication: yes  Wellbutrin    Other Medications: patches     Target Symptoms: Withdrawal and medication side effects. The following were  rated moderate (3) to severe (4) on TCRS:  · Moderate (3): none  · Severe (4): none    Comments: Patient remains tobacco free and taking Wellbutrin and using a 14 mg patch with no negative side effects at this time. Patient taking 150 mg QD due to loss of appetite. Patient states Wellbutrin and patches are very helpful as she has no urges or cravings for nicotine. We discussed session 3 material at this time.    Diagnosis: F17.200    Next Visit: 2 weeks

## 2018-04-13 ENCOUNTER — TELEPHONE (OUTPATIENT)
Dept: SMOKING CESSATION | Facility: CLINIC | Age: 53
End: 2018-04-13

## 2018-04-17 ENCOUNTER — CLINICAL SUPPORT (OUTPATIENT)
Dept: SMOKING CESSATION | Facility: CLINIC | Age: 53
End: 2018-04-17
Payer: COMMERCIAL

## 2018-04-17 DIAGNOSIS — F17.200 TOBACCO USE DISORDER: Primary | ICD-10-CM

## 2018-04-17 PROCEDURE — 99999 PR PBB SHADOW E&M-EST. PATIENT-LVL II: CPT | Mod: PBBFAC,,,

## 2018-04-17 PROCEDURE — 99407 BEHAV CHNG SMOKING > 10 MIN: CPT | Mod: S$GLB,,, | Performed by: GENERAL PRACTICE

## 2018-06-22 ENCOUNTER — TELEPHONE (OUTPATIENT)
Dept: SMOKING CESSATION | Facility: CLINIC | Age: 53
End: 2018-06-22

## 2018-07-17 ENCOUNTER — TELEPHONE (OUTPATIENT)
Dept: SMOKING CESSATION | Facility: CLINIC | Age: 53
End: 2018-07-17

## 2018-08-08 ENCOUNTER — TELEPHONE (OUTPATIENT)
Dept: SMOKING CESSATION | Facility: CLINIC | Age: 53
End: 2018-08-08

## 2018-08-20 ENCOUNTER — PATIENT MESSAGE (OUTPATIENT)
Dept: FAMILY MEDICINE | Facility: CLINIC | Age: 53
End: 2018-08-20

## 2018-08-21 ENCOUNTER — LAB VISIT (OUTPATIENT)
Dept: LAB | Facility: HOSPITAL | Age: 53
End: 2018-08-21
Attending: FAMILY MEDICINE
Payer: COMMERCIAL

## 2018-08-21 DIAGNOSIS — Z00.00 PREVENTATIVE HEALTH CARE: ICD-10-CM

## 2018-08-21 DIAGNOSIS — I10 ESSENTIAL HYPERTENSION: ICD-10-CM

## 2018-08-21 DIAGNOSIS — E78.5 HYPERLIPIDEMIA, UNSPECIFIED HYPERLIPIDEMIA TYPE: ICD-10-CM

## 2018-08-21 LAB
ALBUMIN SERPL BCP-MCNC: 3.5 G/DL
ALP SERPL-CCNC: 82 U/L
ALT SERPL W/O P-5'-P-CCNC: 28 U/L
ANION GAP SERPL CALC-SCNC: 10 MMOL/L
AST SERPL-CCNC: 46 U/L
BILIRUB SERPL-MCNC: 0.6 MG/DL
BUN SERPL-MCNC: 10 MG/DL
CALCIUM SERPL-MCNC: 8.7 MG/DL
CHLORIDE SERPL-SCNC: 106 MMOL/L
CHOLEST SERPL-MCNC: 215 MG/DL
CHOLEST/HDLC SERPL: 3.1 {RATIO}
CO2 SERPL-SCNC: 24 MMOL/L
CREAT SERPL-MCNC: 0.6 MG/DL
EST. GFR  (AFRICAN AMERICAN): >60 ML/MIN/1.73 M^2
EST. GFR  (NON AFRICAN AMERICAN): >60 ML/MIN/1.73 M^2
GLUCOSE SERPL-MCNC: 71 MG/DL
HDLC SERPL-MCNC: 70 MG/DL
HDLC SERPL: 32.6 %
LDLC SERPL CALC-MCNC: 106.2 MG/DL
NONHDLC SERPL-MCNC: 145 MG/DL
POTASSIUM SERPL-SCNC: 3.8 MMOL/L
PROT SERPL-MCNC: 6.4 G/DL
SODIUM SERPL-SCNC: 140 MMOL/L
TRIGL SERPL-MCNC: 194 MG/DL

## 2018-08-21 PROCEDURE — 80061 LIPID PANEL: CPT

## 2018-08-21 PROCEDURE — 80053 COMPREHEN METABOLIC PANEL: CPT

## 2018-08-21 PROCEDURE — 36415 COLL VENOUS BLD VENIPUNCTURE: CPT | Mod: PN

## 2018-08-22 ENCOUNTER — OFFICE VISIT (OUTPATIENT)
Dept: FAMILY MEDICINE | Facility: CLINIC | Age: 53
End: 2018-08-22
Payer: COMMERCIAL

## 2018-08-22 VITALS
BODY MASS INDEX: 16.13 KG/M2 | OXYGEN SATURATION: 99 % | SYSTOLIC BLOOD PRESSURE: 136 MMHG | HEART RATE: 85 BPM | DIASTOLIC BLOOD PRESSURE: 80 MMHG | TEMPERATURE: 99 F | HEIGHT: 59 IN | WEIGHT: 80 LBS

## 2018-08-22 DIAGNOSIS — Z00.00 WELLNESS EXAMINATION: ICD-10-CM

## 2018-08-22 DIAGNOSIS — F17.200 SMOKER: ICD-10-CM

## 2018-08-22 DIAGNOSIS — K21.9 GASTROESOPHAGEAL REFLUX DISEASE, ESOPHAGITIS PRESENCE NOT SPECIFIED: ICD-10-CM

## 2018-08-22 DIAGNOSIS — I10 ESSENTIAL HYPERTENSION: Primary | ICD-10-CM

## 2018-08-22 DIAGNOSIS — E78.5 HYPERLIPIDEMIA, UNSPECIFIED HYPERLIPIDEMIA TYPE: ICD-10-CM

## 2018-08-22 PROCEDURE — 99999 PR PBB SHADOW E&M-EST. PATIENT-LVL III: CPT | Mod: PBBFAC,,, | Performed by: FAMILY MEDICINE

## 2018-08-22 PROCEDURE — 99214 OFFICE O/P EST MOD 30 MIN: CPT | Mod: S$GLB,,, | Performed by: FAMILY MEDICINE

## 2018-08-22 RX ORDER — BUPROPION HYDROCHLORIDE 75 MG/1
75 TABLET ORAL EVERY MORNING
Qty: 30 TABLET | Refills: 5 | Status: SHIPPED | OUTPATIENT
Start: 2018-08-22 | End: 2019-01-17 | Stop reason: SDUPTHER

## 2018-08-22 RX ORDER — ATORVASTATIN CALCIUM 80 MG/1
TABLET, FILM COATED ORAL
Qty: 90 TABLET | Refills: 1 | Status: SHIPPED | OUTPATIENT
Start: 2018-08-22 | End: 2019-02-07 | Stop reason: SDUPTHER

## 2018-08-22 RX ORDER — METOPROLOL SUCCINATE 25 MG/1
TABLET, EXTENDED RELEASE ORAL
Qty: 90 TABLET | Refills: 1 | Status: SHIPPED | OUTPATIENT
Start: 2018-08-22 | End: 2019-02-07 | Stop reason: SDUPTHER

## 2018-08-22 RX ORDER — AMLODIPINE BESYLATE 5 MG/1
TABLET ORAL
Qty: 90 TABLET | Refills: 1 | Status: SHIPPED | OUTPATIENT
Start: 2018-08-22 | End: 2018-12-06 | Stop reason: SDUPTHER

## 2018-08-22 NOTE — PROGRESS NOTES
THIS DOCUMENT WAS MADE IN PART WITH [employer] SOFTWARE.  OCCASIONALLY THIS SOFTWARE WILL MISINTERPRET WORDS OR PHRASES.      Dena Stanton  1965    Subjective     Chief Complaint   Patient presents with    Follow-up       HPI    Dena was seen today for follow-up.    Diagnoses and all orders for this visit:    Essential hypertension  This is a chronic condition, mildly elevated systolic reading.  Repeat was better.  She does feel she gets anxious coming here.  Though she has not been checking at home.      Hyperlipidemia, unspecified hyperlipidemia type  Chronic condition, total cholesterol improved though triglycerides up significantly.  HDL down significantly though it is still 70 previously her HDL was 105.    Gastroesophageal reflux disease, esophagitis presence not specified  This has been stable and controlled    Smoker  She has been attending smoking cessation Clinic.  Did well on wellbutrin, quit smoking for about 5 weeks.  But had progressive weight loss and severely diminished diet while on Wellbutrin.  She never increased above 150 mg.  She even tried cutting that in half for few days but then stopped it.    Active Ambulatory Problems     Diagnosis Date Noted    Hypertension 10/02/2017    Myocardial infarct, old 10/02/2017     Resolved Ambulatory Problems     Diagnosis Date Noted    Cat bite of finger 10/02/2017     Past Medical History:   Diagnosis Date    GERD (gastroesophageal reflux disease)     Hearing impaired person, bilateral     Hyperlipidemia     Hypertension     Myocardial infarction 2001         Review of Systems   Constitutional: Positive for unexpected weight change (Improving now that she has stopped the Wellbutrin). Negative for fatigue and fever.   HENT: Negative for sinus pressure and trouble swallowing.    Eyes: Negative for pain and visual disturbance.   Respiratory: Negative for cough, chest tightness and shortness of breath.    Cardiovascular: Negative for  "chest pain, palpitations and leg swelling.   Gastrointestinal: Negative for abdominal pain, blood in stool, constipation, diarrhea, nausea and vomiting.   Genitourinary: Negative for dysuria, frequency and hematuria.   Musculoskeletal: Negative for arthralgias, gait problem, myalgias and neck pain.   Skin: Negative for rash and wound.   Neurological: Negative for dizziness, tremors, syncope, numbness and headaches.   Psychiatric/Behavioral: Negative for dysphoric mood and sleep disturbance. The patient is not nervous/anxious.        Objective     Physical Exam   Constitutional: She is oriented to person, place, and time. She appears well-developed and well-nourished.   HENT:   Head: Normocephalic and atraumatic.   Eyes: No scleral icterus.   Cardiovascular: Normal rate, regular rhythm and normal heart sounds.   No murmur heard.  Pulmonary/Chest: Effort normal and breath sounds normal. No respiratory distress.   Neurological: She is alert and oriented to person, place, and time.   Skin: Skin is dry. No rash noted. She is not diaphoretic.   Psychiatric: She has a normal mood and affect. Her behavior is normal.   Vitals reviewed.    Vitals:    08/22/18 0914   BP: 136/80   BP Location: Left arm   Patient Position: Sitting   BP Method: Medium (Manual)   Pulse: 85   Temp: 98.7 °F (37.1 °C)   TempSrc: Oral   SpO2: 99%   Weight: 36.3 kg (80 lb 0.4 oz)   Height: 4' 11" (1.499 m)       MOST RECENT LABS IN OUR ELECTRONIC MEDICAL RECORD:     Results for orders placed or performed in visit on 08/21/18   Comprehensive metabolic panel   Result Value Ref Range    Sodium 140 136 - 145 mmol/L    Potassium 3.8 3.5 - 5.1 mmol/L    Chloride 106 95 - 110 mmol/L    CO2 24 23 - 29 mmol/L    Glucose 71 70 - 110 mg/dL    BUN, Bld 10 6 - 20 mg/dL    Creatinine 0.6 0.5 - 1.4 mg/dL    Calcium 8.7 8.7 - 10.5 mg/dL    Total Protein 6.4 6.0 - 8.4 g/dL    Albumin 3.5 3.5 - 5.2 g/dL    Total Bilirubin 0.6 0.1 - 1.0 mg/dL    Alkaline Phosphatase 82 " 55 - 135 U/L    AST 46 (H) 10 - 40 U/L    ALT 28 10 - 44 U/L    Anion Gap 10 8 - 16 mmol/L    eGFR if African American >60.0 >60 mL/min/1.73 m^2    eGFR if non African American >60.0 >60 mL/min/1.73 m^2   Lipid panel   Result Value Ref Range    Cholesterol 215 (H) 120 - 199 mg/dL    Triglycerides 194 (H) 30 - 150 mg/dL    HDL 70 40 - 75 mg/dL    LDL Cholesterol 106.2 63.0 - 159.0 mg/dL    HDL/Chol Ratio 32.6 20.0 - 50.0 %    Total Cholesterol/HDL Ratio 3.1 2.0 - 5.0    Non-HDL Cholesterol 145 mg/dL         Dena was seen today for follow-up.    Diagnoses and all orders for this visit:    Essential hypertension  -     amLODIPine (NORVASC) 5 MG tablet; Take one tablet by mouth once a day.  -     metoprolol succinate (TOPROL-XL) 25 MG 24 hr tablet; Take one tablet by mouth once a day.    Hyperlipidemia, unspecified hyperlipidemia type  -     atorvastatin (LIPITOR) 80 MG tablet; Take one tablet by mouth every pm.    Gastroesophageal reflux disease, esophagitis presence not specified  If the stable  Smoker  -     buPROPion (WELLBUTRIN) 75 MG tablet; Take 1 tablet (75 mg total) by mouth every morning.  Will start 1/2 in the morning    She reports significant weight loss while on Wellbutrin.  She stopped and she has been slowly gaining weight back and her appetite is back.  She would still like to try something for smoking.  We discussed options and will try a much lower dose of Wellbutrin.  Will change the 75 mg tablet which is and immediate release tablet.  And she will take 1/2 of this in the morning time.  If she has significant weight loss or decreased appetite them are going to have to stop altogether.  If she tolerates it will see how she does and could gradually increase maybe to 1/2 twice a day but will see how she goes and will take it slow.  She may wish to restart nicotine replacement as well.    Wellness examination (note that today's visit was not a wellness examination.  This is documented down here the and  the note to schedule her labs for follow-up as a wellness next time.)  -     Comprehensive metabolic panel; Future  -     TSH; Future  -     Lipid panel; Future  -     CBC auto differential; Future

## 2018-08-29 ENCOUNTER — TELEPHONE (OUTPATIENT)
Dept: SMOKING CESSATION | Facility: CLINIC | Age: 53
End: 2018-08-29

## 2018-09-06 ENCOUNTER — CLINICAL SUPPORT (OUTPATIENT)
Dept: SMOKING CESSATION | Facility: CLINIC | Age: 53
End: 2018-09-06
Payer: COMMERCIAL

## 2018-09-06 DIAGNOSIS — F17.210 MODERATE SMOKER (20 OR LESS PER DAY): Primary | ICD-10-CM

## 2018-09-06 PROCEDURE — 99999 PR PBB SHADOW E&M-EST. PATIENT-LVL I: CPT | Mod: PBBFAC,,,

## 2018-09-06 PROCEDURE — 99404 PREV MED CNSL INDIV APPRX 60: CPT | Mod: S$GLB,,, | Performed by: GENERAL PRACTICE

## 2018-09-06 RX ORDER — IBUPROFEN 200 MG
1 TABLET ORAL DAILY
Qty: 14 PATCH | Refills: 0 | Status: SHIPPED | OUTPATIENT
Start: 2018-09-06 | End: 2018-09-20 | Stop reason: SDUPTHER

## 2018-09-06 NOTE — Clinical Note
Patient will be participating in weekly tobacco cessation meetings and will begin the prescribed tobacco cessation medication regime of the 21 mg patch. Patient has used Chantix, Wellbutrin and  patches before.

## 2018-09-20 ENCOUNTER — CLINICAL SUPPORT (OUTPATIENT)
Dept: SMOKING CESSATION | Facility: CLINIC | Age: 53
End: 2018-09-20
Payer: COMMERCIAL

## 2018-09-20 DIAGNOSIS — F17.210 MODERATE SMOKER (20 OR LESS PER DAY): ICD-10-CM

## 2018-09-20 DIAGNOSIS — F17.200 TOBACCO USE DISORDER: Primary | ICD-10-CM

## 2018-09-20 PROCEDURE — 99404 PREV MED CNSL INDIV APPRX 60: CPT | Mod: S$GLB,,, | Performed by: GENERAL PRACTICE

## 2018-09-20 PROCEDURE — 99999 PR PBB SHADOW E&M-EST. PATIENT-LVL II: CPT | Mod: PBBFAC,,,

## 2018-09-20 RX ORDER — IBUPROFEN 200 MG
1 TABLET ORAL DAILY
Qty: 14 PATCH | Refills: 0 | Status: SHIPPED | OUTPATIENT
Start: 2018-09-20 | End: 2018-10-04 | Stop reason: SDUPTHER

## 2018-09-20 NOTE — PROGRESS NOTES
Individual Follow-Up Form    9/20/2018    Quit Date:     Clinical Status of Patient: Outpatient    Length of Service: 60 minutes    Continuing Medication: yes  Patches    Other Medications: Wellbutrin prescribed by her PCP     Target Symptoms: Withdrawal and medication side effects. The following were  rated moderate (3) to severe (4) on TCRS:  · Moderate (3): none  · Severe (4): none    Comments: Patient is currently smoking 15 -18 cigarettes per day and using a 21 mg patch with no negative side effects at this time. Patient states she is tolerating Wellbutrin much better and finds it very helpful in combination with the patches. We discussed reduction and strategies to help her cut down on her cigarette intake.  We also discussed medications and dosages. Patient has follow up in 2 weeks.    Diagnosis: F17.200    Next Visit: 2 weeks

## 2018-09-20 NOTE — Clinical Note
Patient is currently smoking 15 -18 cigarettes per day and using a 21 mg patch with no negative side effects at this time. Patient states she is tolerating Wellbutrin much better and finds it very helpful in combination with the patches. We discussed reduction and strategies to help her cut down on her cigarette intake.  We also discussed medications and dosages. Patient has follow up in 2 weeks.

## 2018-10-02 ENCOUNTER — OFFICE VISIT (OUTPATIENT)
Dept: OTOLARYNGOLOGY | Facility: CLINIC | Age: 53
End: 2018-10-02
Payer: COMMERCIAL

## 2018-10-02 VITALS
WEIGHT: 83.31 LBS | SYSTOLIC BLOOD PRESSURE: 131 MMHG | HEART RATE: 85 BPM | HEIGHT: 59 IN | DIASTOLIC BLOOD PRESSURE: 83 MMHG | BODY MASS INDEX: 16.8 KG/M2

## 2018-10-02 DIAGNOSIS — H74.03 TS (TYMPANOSCLEROSIS), BILATERAL: Primary | ICD-10-CM

## 2018-10-02 DIAGNOSIS — H61.301 ACQUIRED STENOSIS OF RIGHT EXTERNAL EAR CANAL: ICD-10-CM

## 2018-10-02 DIAGNOSIS — H90.0 CONDUCTIVE HEARING LOSS, BILATERAL: ICD-10-CM

## 2018-10-02 DIAGNOSIS — Z97.4 WEARS HEARING AID IN BOTH EARS: ICD-10-CM

## 2018-10-02 DIAGNOSIS — H61.23 BILATERAL IMPACTED CERUMEN: ICD-10-CM

## 2018-10-02 PROCEDURE — 69210 REMOVE IMPACTED EAR WAX UNI: CPT | Mod: S$GLB,,, | Performed by: NURSE PRACTITIONER

## 2018-10-02 PROCEDURE — 99999 PR PBB SHADOW E&M-EST. PATIENT-LVL III: CPT | Mod: PBBFAC,,, | Performed by: NURSE PRACTITIONER

## 2018-10-02 PROCEDURE — 99213 OFFICE O/P EST LOW 20 MIN: CPT | Mod: 25,S$GLB,, | Performed by: NURSE PRACTITIONER

## 2018-10-02 NOTE — PROGRESS NOTES
Subjective:       Patient ID: Dena Stanton is a 52 y.o. female.    Chief Complaint: Cerumen Impaction    HPI   Patient returns today for hearing loss. H/o CHL AD>AS. Patient had h/o perforated TMs, h/o tympanoplasties in 1973. Patient has worn hearing aids since 1991. She was previously referred to Dr. Hernandez due to her interest to discuss possible surgical options to improve her CHL.   H/o noise exposure (rock concerts). (+)Tinnitus. Significant family h/o HL. She works in a [employer], hard to hear patrons whisper. She suspects she needs new hearing aids as her current hearing aids are approximately ten years old.     Review of Systems   Constitutional: Negative.    HENT: Positive for hearing loss and tinnitus.    Eyes: Negative.    Respiratory: Negative.    Cardiovascular: Negative.    Gastrointestinal: Negative.    Musculoskeletal: Negative.    Skin: Negative.    Neurological: Negative.    Hematological: Negative.    Psychiatric/Behavioral: Negative.        Objective:      Physical Exam   Constitutional: She is oriented to person, place, and time. Vital signs are normal. She appears well-developed and well-nourished. She is cooperative. She does not appear ill. No distress.   HENT:   Head: Normocephalic and atraumatic.   Right Ear: External ear and ear canal normal. No drainage, swelling or tenderness. Tympanic membrane is scarred (atresia/stenosis of EAC with no visible TM landmarks). Tympanic membrane is not erythematous. Decreased hearing is noted.   Left Ear: External ear and ear canal normal. No drainage, swelling or tenderness. Tympanic membrane is scarred. Tympanic membrane is not erythematous.  No middle ear effusion. Decreased hearing is noted.   Nose: Nose normal. No mucosal edema or rhinorrhea. Right sinus exhibits no maxillary sinus tenderness and no frontal sinus tenderness. Left sinus exhibits no maxillary sinus tenderness and no frontal sinus tenderness.   Mouth/Throat: Uvula is midline,  oropharynx is clear and moist and mucous membranes are normal. Mucous membranes are not pale, not dry and not cyanotic. No oral lesions. No oropharyngeal exudate, posterior oropharyngeal edema or posterior oropharyngeal erythema.     SEPARATE PROCEDURE IN OFFICE:   Procedure: Removal of impacted cerumen, bilateral   Pre Procedure Diagnosis: Cerumen Impaction   Post Procedure Diagnosis: Cerumen Impaction   Verbal informed consent in regards to risk of trauma to ear canal, ear drum or hearing, discomfort during procedure and/or inability to remove cerumen impaction in one session or unforeseen events or complications.   No anesthesia.     Procedure in detail:   Ear canal visualized bilateral with appropriate size ear speculum utilizing Operating Head Binocular Otomicroscope   Utilizing the following: Ring curet, delicate alligator forceps AU. The impacted cerumen of the ear canals was removed atraumatically. The TM and EAC were then inspected and found to be clear of wax. See description of TMs/EACs in PE above.   Complications: No   Condition: Improved/Good     Eyes: Conjunctivae, EOM and lids are normal. Pupils are equal, round, and reactive to light. Right eye exhibits no discharge. Left eye exhibits no discharge. No scleral icterus.   Neck: Trachea normal and normal range of motion. Neck supple. No tracheal deviation present. No thyroid mass and no thyromegaly present.   Cardiovascular: Normal rate.   Pulmonary/Chest: Effort normal. No stridor. No respiratory distress. She has no wheezes.   Musculoskeletal: Normal range of motion.   Lymphadenopathy:        Head (right side): No submental, no submandibular, no tonsillar, no preauricular and no posterior auricular adenopathy present.        Head (left side): No submental, no submandibular, no tonsillar, no preauricular and no posterior auricular adenopathy present.     She has no cervical adenopathy.        Right cervical: No superficial cervical and no posterior  cervical adenopathy present.       Left cervical: No superficial cervical and no posterior cervical adenopathy present.   Neurological: She is alert and oriented to person, place, and time. She has normal strength. Coordination and gait normal.   Skin: Skin is warm, dry and intact. No lesion and no rash noted. She is not diaphoretic. No cyanosis. No pallor.   Psychiatric: She has a normal mood and affect. Her speech is normal and behavior is normal. Judgment and thought content normal. Cognition and memory are normal.   Nursing note and vitals reviewed.      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 Hernandez due to her interest in discussing possible surgical options to improve her CHL.     Continue daily use of binaural amplification. Patient is interested in obtaining new hearing aids as her current pair are approximately 10 years old. Explained she would need updated audiogram as her last audiogram was more than six months ago.

## 2018-10-04 ENCOUNTER — CLINICAL SUPPORT (OUTPATIENT)
Dept: AUDIOLOGY | Facility: CLINIC | Age: 53
End: 2018-10-04
Payer: COMMERCIAL

## 2018-10-04 ENCOUNTER — CLINICAL SUPPORT (OUTPATIENT)
Dept: SMOKING CESSATION | Facility: CLINIC | Age: 53
End: 2018-10-04
Payer: COMMERCIAL

## 2018-10-04 DIAGNOSIS — H91.93 BILATERAL HEARING LOSS, UNSPECIFIED HEARING LOSS TYPE: Primary | ICD-10-CM

## 2018-10-04 DIAGNOSIS — F17.200 TOBACCO USE DISORDER: Primary | ICD-10-CM

## 2018-10-04 DIAGNOSIS — H91.90 HEARING DIFFICULTY, UNSPECIFIED LATERALITY: Primary | ICD-10-CM

## 2018-10-04 DIAGNOSIS — F17.210 MODERATE SMOKER (20 OR LESS PER DAY): ICD-10-CM

## 2018-10-04 DIAGNOSIS — H93.19 TINNITUS, UNSPECIFIED LATERALITY: ICD-10-CM

## 2018-10-04 PROCEDURE — 99999 PR PBB SHADOW E&M-EST. PATIENT-LVL I: CPT | Mod: PBBFAC,,,

## 2018-10-04 PROCEDURE — 99404 PREV MED CNSL INDIV APPRX 60: CPT | Mod: S$GLB,,, | Performed by: GENERAL PRACTICE

## 2018-10-04 PROCEDURE — 99999 PR PBB SHADOW E&M-EST. PATIENT-LVL II: CPT | Mod: PBBFAC,,,

## 2018-10-04 PROCEDURE — 92552 PURE TONE AUDIOMETRY AIR: CPT | Mod: S$GLB,,, | Performed by: AUDIOLOGIST

## 2018-10-04 RX ORDER — IBUPROFEN 200 MG
1 TABLET ORAL DAILY
Qty: 14 PATCH | Refills: 0 | Status: SHIPPED | OUTPATIENT
Start: 2018-10-04 | End: 2018-10-18 | Stop reason: SDUPTHER

## 2018-10-04 NOTE — Clinical Note
Patient is currently smoking 10-15 cigarettes per day and using a 21 mg patch with no negative side effects at this time. Patient is taking Wellbutrin prescribed by Dr Masters with no negative side effects. We discussed strategies to aid in her quit and material from session 2.

## 2018-10-04 NOTE — PROGRESS NOTES
Individual Follow-Up Form    10/4/2018    Quit Date:     Clinical Status of Patient: Outpatient    Length of Service: 60 minutes    Continuing Medication: yes  Patches    Other Medications: Wellbutrin prescribed by her PCP     Target Symptoms: Withdrawal and medication side effects. The following were  rated moderate (3) to severe (4) on TCRS:  · Moderate (3): none  · Severe (4): none    Comments: Patient is currently smoking 10-15 cigarettes per day and using a 21 mg patch with no negative side effects at this time. Patient is taking Wellbutrin prescribed by Dr Mastres with no negative side effects. We discussed strategies to aid in her quit and material from session 2.    Diagnosis: F17.200    Next Visit: 2 weeks

## 2018-10-18 ENCOUNTER — CLINICAL SUPPORT (OUTPATIENT)
Dept: SMOKING CESSATION | Facility: CLINIC | Age: 53
End: 2018-10-18
Payer: COMMERCIAL

## 2018-10-18 DIAGNOSIS — F17.200 TOBACCO USE DISORDER: Primary | ICD-10-CM

## 2018-10-18 DIAGNOSIS — K21.9 GASTROESOPHAGEAL REFLUX DISEASE, ESOPHAGITIS PRESENCE NOT SPECIFIED: ICD-10-CM

## 2018-10-18 DIAGNOSIS — F17.210 MODERATE SMOKER (20 OR LESS PER DAY): ICD-10-CM

## 2018-10-18 PROCEDURE — 99404 PREV MED CNSL INDIV APPRX 60: CPT | Mod: S$GLB,,, | Performed by: GENERAL PRACTICE

## 2018-10-18 PROCEDURE — 99999 PR PBB SHADOW E&M-EST. PATIENT-LVL II: CPT | Mod: PBBFAC,,,

## 2018-10-18 RX ORDER — LANSOPRAZOLE 30 MG/1
CAPSULE, DELAYED RELEASE ORAL
Qty: 90 CAPSULE | Refills: 1 | Status: SHIPPED | OUTPATIENT
Start: 2018-10-18 | End: 2019-02-07 | Stop reason: SDUPTHER

## 2018-10-18 RX ORDER — IBUPROFEN 200 MG
1 TABLET ORAL DAILY
Qty: 14 PATCH | Refills: 0 | Status: SHIPPED | OUTPATIENT
Start: 2018-10-18 | End: 2018-11-02 | Stop reason: DRUGHIGH

## 2018-10-18 NOTE — PROGRESS NOTES
Individual Follow-Up Form    10/18/2018    Quit Date:     Clinical Status of Patient: Outpatient    Length of Service: 60 minutes    Continuing Medication: yes  Patches    Other Medications: Wellbutrin prescribed by Dr Masters     Target Symptoms: Withdrawal and medication side effects. The following were  rated moderate (3) to severe (4) on TCRS:  · Moderate (3): none  · Severe (4): none    Comments: Patient is currently smoking 8 cigarettes per day and taking Wellbutrin and using a 21 mg patch with no negative side effects at this time. We discussed setting a quit date and strategies to help achieve her quit. We discussed medications and dosages.    Diagnosis: F17.200    Next Visit: 2 weeks

## 2018-10-18 NOTE — Clinical Note
Patient is currently smoking 8 cigarettes per day and taking Wellbutrin and using a 21 mg patch with no negative side effects at this time. We discussed setting a quit date and strategies to help achieve her quit. We discussed medications and dosages.

## 2018-11-02 ENCOUNTER — CLINICAL SUPPORT (OUTPATIENT)
Dept: SMOKING CESSATION | Facility: CLINIC | Age: 53
End: 2018-11-02
Payer: COMMERCIAL

## 2018-11-02 DIAGNOSIS — F17.200 TOBACCO USE DISORDER: Primary | ICD-10-CM

## 2018-11-02 PROCEDURE — 99999 PR PBB SHADOW E&M-EST. PATIENT-LVL II: CPT | Mod: PBBFAC,,,

## 2018-11-02 PROCEDURE — 99404 PREV MED CNSL INDIV APPRX 60: CPT | Mod: S$GLB,,, | Performed by: GENERAL PRACTICE

## 2018-11-02 RX ORDER — MICONAZOLE NITRATE 2 %
CREAM (GRAM) TOPICAL
Qty: 110 EACH | Refills: 0 | Status: SHIPPED | OUTPATIENT
Start: 2018-11-02 | End: 2018-12-13 | Stop reason: SDUPTHER

## 2018-11-02 RX ORDER — IBUPROFEN 200 MG
1 TABLET ORAL DAILY
Qty: 14 PATCH | Refills: 0 | Status: SHIPPED | OUTPATIENT
Start: 2018-11-02 | End: 2018-11-15 | Stop reason: SDUPTHER

## 2018-11-02 NOTE — PROGRESS NOTES
Individual Follow-Up Form    11/2/2018    Quit Date:     Clinical Status of Patient: Outpatient    Length of Service: 60 minutes    Continuing Medication: yes  Patches    Other Medications: NRT gum      Target Symptoms: Withdrawal and medication side effects. The following were  rated moderate (3) to severe (4) on TCRS:  · Moderate (3): none  · Severe (4): none    Comments: Patient is currently smoking 5 cigarettes per day and using a 21 mg patch with no negative side effects at this time. I will add the 2 mg NRT gum and change to a 14 mg patch at this time. We discussed strategies to help her achieve her quit. We discussed medications and dosages at this time.    Diagnosis: F17.200    Next Visit: 2 weeks

## 2018-11-02 NOTE — Clinical Note
Patient is currently smoking 5 cigarettes per day and using a 21 mg patch with no negative side effects at this time. I will add the 2 mg NRT gum and change to a 14 mg patch at this time. We discussed strategies to help her achieve her quit. We discussed medications and dosages at this time.

## 2018-11-15 ENCOUNTER — CLINICAL SUPPORT (OUTPATIENT)
Dept: SMOKING CESSATION | Facility: CLINIC | Age: 53
End: 2018-11-15
Payer: COMMERCIAL

## 2018-11-15 DIAGNOSIS — F17.200 TOBACCO USE DISORDER: Primary | ICD-10-CM

## 2018-11-15 PROCEDURE — 99999 PR PBB SHADOW E&M-EST. PATIENT-LVL II: CPT | Mod: PBBFAC,,,

## 2018-11-15 PROCEDURE — 99404 PREV MED CNSL INDIV APPRX 60: CPT | Mod: S$GLB,,, | Performed by: GENERAL PRACTICE

## 2018-11-15 RX ORDER — IBUPROFEN 200 MG
1 TABLET ORAL DAILY
Qty: 14 PATCH | Refills: 0 | Status: SHIPPED | OUTPATIENT
Start: 2018-11-15 | End: 2018-11-28 | Stop reason: SDUPTHER

## 2018-11-15 NOTE — Clinical Note
Patient is currently smoking 4-5 cigarettes per day and taking Wellbutrin prescribed by her PCP and 14 mg patch and gum with no negative side effects at this time. We discussed setting a quit date as well as stress management and strategies to help her achieve her quit.

## 2018-11-28 ENCOUNTER — CLINICAL SUPPORT (OUTPATIENT)
Dept: SMOKING CESSATION | Facility: CLINIC | Age: 53
End: 2018-11-28
Payer: COMMERCIAL

## 2018-11-28 DIAGNOSIS — F17.200 TOBACCO USE DISORDER: Primary | ICD-10-CM

## 2018-11-28 PROCEDURE — 99999 PR PBB SHADOW E&M-EST. PATIENT-LVL II: CPT | Mod: PBBFAC,,,

## 2018-11-28 PROCEDURE — 99407 BEHAV CHNG SMOKING > 10 MIN: CPT | Mod: S$GLB,,, | Performed by: GENERAL PRACTICE

## 2018-11-28 RX ORDER — IBUPROFEN 200 MG
1 TABLET ORAL DAILY
Qty: 14 PATCH | Refills: 0 | Status: SHIPPED | OUTPATIENT
Start: 2018-11-28 | End: 2018-12-13 | Stop reason: SDUPTHER

## 2018-12-05 ENCOUNTER — TELEPHONE (OUTPATIENT)
Dept: SMOKING CESSATION | Facility: CLINIC | Age: 53
End: 2018-12-05

## 2018-12-06 DIAGNOSIS — I10 ESSENTIAL HYPERTENSION: ICD-10-CM

## 2018-12-06 RX ORDER — AMLODIPINE BESYLATE 5 MG/1
TABLET ORAL
Qty: 90 TABLET | Refills: 1 | Status: SHIPPED | OUTPATIENT
Start: 2018-12-06 | End: 2019-02-07 | Stop reason: SDUPTHER

## 2018-12-13 ENCOUNTER — CLINICAL SUPPORT (OUTPATIENT)
Dept: SMOKING CESSATION | Facility: CLINIC | Age: 53
End: 2018-12-13
Payer: COMMERCIAL

## 2018-12-13 DIAGNOSIS — F17.200 TOBACCO USE DISORDER: Primary | ICD-10-CM

## 2018-12-13 PROCEDURE — 99404 PREV MED CNSL INDIV APPRX 60: CPT | Mod: S$GLB,,, | Performed by: GENERAL PRACTICE

## 2018-12-13 PROCEDURE — 99999 PR PBB SHADOW E&M-EST. PATIENT-LVL II: CPT | Mod: PBBFAC,,,

## 2018-12-13 RX ORDER — IBUPROFEN 200 MG
1 TABLET ORAL DAILY
Qty: 14 PATCH | Refills: 0 | Status: SHIPPED | OUTPATIENT
Start: 2018-12-13 | End: 2019-03-21 | Stop reason: DRUGHIGH

## 2018-12-13 RX ORDER — MICONAZOLE NITRATE 2 %
CREAM (GRAM) TOPICAL
Qty: 110 EACH | Refills: 0 | Status: SHIPPED | OUTPATIENT
Start: 2018-12-13 | End: 2019-05-16 | Stop reason: DRUGHIGH

## 2018-12-13 NOTE — PROGRESS NOTES
Individual Follow-Up Form    12/13/2018    Quit Date: 12/13/18    Clinical Status of Patient: Outpatient    Length of Service: 60 minutes    Continuing Medication: yes  Patches    Other Medications: NRT gum and Wellbutrin prescribed by Dr Masters     Target Symptoms: Withdrawal and medication side effects. The following were  rated moderate (3) to severe (4) on TCRS:  · Moderate (3): none  · Severe (4): none    Comments: Patient is currently tobacco free and using a 14 mg patch and gum with no negative side effects at this time. Patient wants to stay on the 14 mg patch for 2 weeks then will stop using NRT at that time. We discussed medications, dosages and relapses as well as strategies to maintain her quit.    Diagnosis: F17.200    Next Visit: 2 weeks

## 2018-12-13 NOTE — Clinical Note
Patient is currently tobacco free and using a 14 mg patch and gum with no negative side effects at this time. Patient wants to stay on the 14 mg patch for 2 weeks then will stop using NRT at that time. We discussed medications, dosages and relapses as well as strategies to maintain her quit.

## 2018-12-27 ENCOUNTER — CLINICAL SUPPORT (OUTPATIENT)
Dept: SMOKING CESSATION | Facility: CLINIC | Age: 53
End: 2018-12-27
Payer: COMMERCIAL

## 2018-12-27 DIAGNOSIS — F17.200 TOBACCO USE DISORDER: Primary | ICD-10-CM

## 2018-12-27 PROCEDURE — 99407 BEHAV CHNG SMOKING > 10 MIN: CPT | Mod: S$GLB,,, | Performed by: GENERAL PRACTICE

## 2018-12-27 PROCEDURE — 99999 PR PBB SHADOW E&M-EST. PATIENT-LVL II: CPT | Mod: PBBFAC,,,

## 2019-01-17 DIAGNOSIS — F17.200 SMOKER: ICD-10-CM

## 2019-01-17 RX ORDER — BUPROPION HYDROCHLORIDE 75 MG/1
75 TABLET ORAL EVERY MORNING
Qty: 30 TABLET | Refills: 5 | Status: SHIPPED | OUTPATIENT
Start: 2019-01-17 | End: 2019-02-07

## 2019-01-17 NOTE — TELEPHONE ENCOUNTER
Pt Of Rafael Masters MD    Last seen on: 08/22/18    Next appt: 02/07/19    Last refill: 11/10/18    Allergies:   Review of patient's allergies indicates:   Allergen Reactions    Chantix [varenicline] Other (See Comments)     Mood changes       Pharmacy: [facility]  [phone]   Fax: [phone]    Please review! Thank you!

## 2019-02-07 ENCOUNTER — OFFICE VISIT (OUTPATIENT)
Dept: FAMILY MEDICINE | Facility: CLINIC | Age: 54
End: 2019-02-07
Payer: COMMERCIAL

## 2019-02-07 ENCOUNTER — LAB VISIT (OUTPATIENT)
Dept: LAB | Facility: HOSPITAL | Age: 54
End: 2019-02-07
Payer: COMMERCIAL

## 2019-02-07 VITALS
HEIGHT: 59 IN | TEMPERATURE: 98 F | RESPIRATION RATE: 16 BRPM | HEART RATE: 76 BPM | SYSTOLIC BLOOD PRESSURE: 126 MMHG | BODY MASS INDEX: 18.36 KG/M2 | WEIGHT: 91.06 LBS | DIASTOLIC BLOOD PRESSURE: 78 MMHG

## 2019-02-07 DIAGNOSIS — E78.5 HYPERLIPIDEMIA, UNSPECIFIED HYPERLIPIDEMIA TYPE: ICD-10-CM

## 2019-02-07 DIAGNOSIS — Z00.00 WELLNESS EXAMINATION: Primary | ICD-10-CM

## 2019-02-07 DIAGNOSIS — I10 ESSENTIAL HYPERTENSION: ICD-10-CM

## 2019-02-07 DIAGNOSIS — K21.9 GASTROESOPHAGEAL REFLUX DISEASE, ESOPHAGITIS PRESENCE NOT SPECIFIED: ICD-10-CM

## 2019-02-07 DIAGNOSIS — Z00.00 WELLNESS EXAMINATION: ICD-10-CM

## 2019-02-07 DIAGNOSIS — F17.200 SMOKER: ICD-10-CM

## 2019-02-07 LAB
ALBUMIN SERPL BCP-MCNC: 3.7 G/DL
ALP SERPL-CCNC: 88 U/L
ALT SERPL W/O P-5'-P-CCNC: 25 U/L
ANION GAP SERPL CALC-SCNC: 8 MMOL/L
AST SERPL-CCNC: 38 U/L
BASOPHILS # BLD AUTO: 0.05 K/UL
BASOPHILS NFR BLD: 0.9 %
BILIRUB SERPL-MCNC: 0.2 MG/DL
BUN SERPL-MCNC: 13 MG/DL
CALCIUM SERPL-MCNC: 9.3 MG/DL
CHLORIDE SERPL-SCNC: 106 MMOL/L
CHOLEST SERPL-MCNC: 289 MG/DL
CHOLEST SERPL-MCNC: 289 MG/DL
CHOLEST/HDLC SERPL: 4.3 {RATIO}
CHOLEST/HDLC SERPL: 4.3 {RATIO}
CO2 SERPL-SCNC: 28 MMOL/L
CREAT SERPL-MCNC: 0.6 MG/DL
DIFFERENTIAL METHOD: ABNORMAL
EOSINOPHIL # BLD AUTO: 0.1 K/UL
EOSINOPHIL NFR BLD: 1 %
ERYTHROCYTE [DISTWIDTH] IN BLOOD BY AUTOMATED COUNT: 14.1 %
EST. GFR  (AFRICAN AMERICAN): >60 ML/MIN/1.73 M^2
EST. GFR  (NON AFRICAN AMERICAN): >60 ML/MIN/1.73 M^2
GLUCOSE SERPL-MCNC: 95 MG/DL
HCT VFR BLD AUTO: 42.3 %
HDLC SERPL-MCNC: 67 MG/DL
HDLC SERPL-MCNC: 67 MG/DL
HDLC SERPL: 23.2 %
HDLC SERPL: 23.2 %
HGB BLD-MCNC: 13.4 G/DL
IMM GRANULOCYTES # BLD AUTO: 0.02 K/UL
IMM GRANULOCYTES NFR BLD AUTO: 0.3 %
LDLC SERPL CALC-MCNC: 169.6 MG/DL
LDLC SERPL CALC-MCNC: 169.6 MG/DL
LYMPHOCYTES # BLD AUTO: 1.4 K/UL
LYMPHOCYTES NFR BLD: 24.1 %
MCH RBC QN AUTO: 31.8 PG
MCHC RBC AUTO-ENTMCNC: 31.7 G/DL
MCV RBC AUTO: 101 FL
MONOCYTES # BLD AUTO: 0.5 K/UL
MONOCYTES NFR BLD: 9.3 %
NEUTROPHILS # BLD AUTO: 3.7 K/UL
NEUTROPHILS NFR BLD: 64.4 %
NONHDLC SERPL-MCNC: 222 MG/DL
NONHDLC SERPL-MCNC: 222 MG/DL
NRBC BLD-RTO: 0 /100 WBC
PLATELET # BLD AUTO: 321 K/UL
PMV BLD AUTO: 11.1 FL
POTASSIUM SERPL-SCNC: 5.1 MMOL/L
PROT SERPL-MCNC: 7.1 G/DL
RBC # BLD AUTO: 4.21 M/UL
SODIUM SERPL-SCNC: 142 MMOL/L
TRIGL SERPL-MCNC: 262 MG/DL
TRIGL SERPL-MCNC: 262 MG/DL
TSH SERPL DL<=0.005 MIU/L-ACNC: 2.95 UIU/ML
WBC # BLD AUTO: 5.81 K/UL

## 2019-02-07 PROCEDURE — 99999 PR PBB SHADOW E&M-EST. PATIENT-LVL III: ICD-10-PCS | Mod: PBBFAC,,, | Performed by: FAMILY MEDICINE

## 2019-02-07 PROCEDURE — 84443 ASSAY THYROID STIM HORMONE: CPT

## 2019-02-07 PROCEDURE — 36415 COLL VENOUS BLD VENIPUNCTURE: CPT | Mod: PN

## 2019-02-07 PROCEDURE — 80053 COMPREHEN METABOLIC PANEL: CPT

## 2019-02-07 PROCEDURE — 99396 PREV VISIT EST AGE 40-64: CPT | Mod: S$GLB,,, | Performed by: FAMILY MEDICINE

## 2019-02-07 PROCEDURE — 80061 LIPID PANEL: CPT

## 2019-02-07 PROCEDURE — 85025 COMPLETE CBC W/AUTO DIFF WBC: CPT

## 2019-02-07 PROCEDURE — 99396 PR PREVENTIVE VISIT,EST,40-64: ICD-10-PCS | Mod: S$GLB,,, | Performed by: FAMILY MEDICINE

## 2019-02-07 PROCEDURE — 99999 PR PBB SHADOW E&M-EST. PATIENT-LVL III: CPT | Mod: PBBFAC,,, | Performed by: FAMILY MEDICINE

## 2019-02-07 RX ORDER — LANSOPRAZOLE 30 MG/1
CAPSULE, DELAYED RELEASE ORAL
Qty: 90 CAPSULE | Refills: 1 | Status: SHIPPED | OUTPATIENT
Start: 2019-02-07

## 2019-02-07 RX ORDER — METOPROLOL SUCCINATE 25 MG/1
TABLET, EXTENDED RELEASE ORAL
Qty: 90 TABLET | Refills: 1 | Status: SHIPPED | OUTPATIENT
Start: 2019-02-07 | End: 2019-07-27 | Stop reason: SDUPTHER

## 2019-02-07 RX ORDER — ATORVASTATIN CALCIUM 80 MG/1
TABLET, FILM COATED ORAL
Qty: 90 TABLET | Refills: 1 | Status: SHIPPED | OUTPATIENT
Start: 2019-02-07

## 2019-02-07 RX ORDER — AMLODIPINE BESYLATE 5 MG/1
TABLET ORAL
Qty: 90 TABLET | Refills: 1 | Status: SHIPPED | OUTPATIENT
Start: 2019-02-07 | End: 2019-08-28 | Stop reason: SDUPTHER

## 2019-02-07 RX ORDER — BUPROPION HYDROCHLORIDE 75 MG/1
75 TABLET ORAL 2 TIMES DAILY
Qty: 180 TABLET | Refills: 1 | Status: SHIPPED | OUTPATIENT
Start: 2019-02-07 | End: 2020-03-24 | Stop reason: ALTCHOICE

## 2019-02-07 NOTE — PROGRESS NOTES
The the the the the THIS DOCUMENT WAS MADE IN PART WITH VOICE RECOGNITION SOFTWARE.  OCCASIONALLY THIS SOFTWARE WILL MISINTERPRET WORDS OR PHRASES.      Dena Stanton  1965    Dena was seen today for annual exam.    Diagnoses and all orders for this visit:    Wellness examination  Discussed age appropriate preventative health care  She declines all vaccinations recommendations    Essential hypertension  -     metoprolol succinate (TOPROL-XL) 25 MG 24 hr tablet; Take one tablet by mouth once a day.  -     amLODIPine (NORVASC) 5 MG tablet; Take one tablet by mouth once a day.    Hyperlipidemia, unspecified hyperlipidemia type  -     atorvastatin (LIPITOR) 80 MG tablet; Take one tablet by mouth every pm.    Gastroesophageal reflux disease, esophagitis presence not specified  -     lansoprazole (PREVACID) 30 MG capsule; Take one capsule by mouth once a day.  We did discuss some long-term concerns regarding PPIs.  Continue for now but will work on smoking cessation and other lifestyle changes and see if we can reduce this in the future    Smoker  -     buPROPion (WELLBUTRIN) 75 MG tablet; Take 1 tablet (75 mg total) by mouth 2 (two) times daily.  She is back to smoking about 6 cigarettes a day.  She is tolerating the lower dose of Wellbutrin.  She has an appointment with the smoking cessation Clinic.  Recall that she had significant weight loss and decrease in appetite with the higher dose of Wellbutrin.        Subjective     Chief Complaint   Patient presents with    Annual Exam       HPI:        HPI elements addressed above in the assessment and plan including problems, diagnosis, stability/instability,  improving/worsening, and chronicity will not be duplicated in this section. Any important additional HPI topics will be discussed here if needed.        Active Ambulatory Problems     Diagnosis Date Noted    Hypertension 10/02/2017    Myocardial infarct, old 10/02/2017     Resolved Ambulatory Problems      Diagnosis Date Noted    Cat bite of finger 10/02/2017     Past Medical History:   Diagnosis Date    GERD (gastroesophageal reflux disease)     Hearing impaired person, bilateral     Hyperlipidemia     Hypertension     Myocardial infarction        Current Outpatient Medications on File Prior to Visit   Medication Sig Dispense Refill    ASPIRIN/SALICYLAMIDE/CAFFEINE (ARTHRITIS STRENGTH BC POWDER ORAL) Take by mouth.      nicotine, polacrilex, (NICORETTE) 2 mg Gum Take up to 6 pieces by mouth daily as needed 110 each 0    [DISCONTINUED] atorvastatin (LIPITOR) 80 MG tablet Take one tablet by mouth every pm. 90 tablet 1    [DISCONTINUED] buPROPion (WELLBUTRIN) 75 MG tablet Take 1 tablet (75 mg total) by mouth every morning. 30 tablet 5    [DISCONTINUED] lansoprazole (PREVACID) 30 MG capsule Take one capsule by mouth once a day. 90 capsule 1    [DISCONTINUED] metoprolol succinate (TOPROL-XL) 25 MG 24 hr tablet Take one tablet by mouth once a day. 90 tablet 1    nicotine (NICODERM CQ) 14 mg/24 hr Place 1 patch onto the skin once daily. 14 patch 0    [DISCONTINUED] amLODIPine (NORVASC) 5 MG tablet Take one tablet by mouth once a day. 90 tablet 1     No current facility-administered medications on file prior to visit.        Review of patient's allergies indicates:   Allergen Reactions    Chantix [varenicline] Other (See Comments)     Mood changes       Family History   Problem Relation Age of Onset    Cancer Mother     Lung cancer Mother     Suicide Father        Social History     Tobacco Use    Smoking status: Current Every Day Smoker     Packs/day: 1.00     Years: 33.00     Pack years: 33.00     Types: Cigarettes     Last attempt to quit: 2018     Years since quittin.1    Smokeless tobacco: Never Used   Substance Use Topics    Alcohol use: Yes     Alcohol/week: 1.8 oz     Types: 3 Glasses of wine per week     Comment: pt states about 3 glasses of wine per week    Drug use: No          Review of Systems   Constitutional: Negative for fatigue and unexpected weight change.   HENT: Negative for sinus pressure and trouble swallowing.    Eyes: Negative for visual disturbance.   Respiratory: Negative for cough, chest tightness and shortness of breath.    Cardiovascular: Negative for chest pain, palpitations and leg swelling.   Gastrointestinal: Negative for abdominal pain, blood in stool, constipation, diarrhea, nausea and vomiting.   Genitourinary: Negative for dysuria, frequency and hematuria.   Musculoskeletal: Negative for arthralgias, gait problem, myalgias and neck pain.   Skin: Negative for rash and wound.   Neurological: Negative for dizziness, tremors, syncope, numbness and headaches.   Psychiatric/Behavioral: Negative for dysphoric mood and sleep disturbance. The patient is not nervous/anxious.        Objective     Physical Exam   Constitutional: She is oriented to person, place, and time. She appears well-developed and well-nourished.  Non-toxic appearance. No distress.   HENT:   Head: Normocephalic and atraumatic.   Right Ear: Tympanic membrane, external ear and ear canal normal.   Left Ear: Tympanic membrane, external ear and ear canal normal.   Nose: Nose normal.   Mouth/Throat: Oropharynx is clear and moist. No oropharyngeal exudate.   Eyes: Conjunctivae and EOM are normal. Pupils are equal, round, and reactive to light. Right eye exhibits no discharge. Left eye exhibits no discharge. No scleral icterus.   Neck: Normal range of motion. Neck supple. No JVD present. No tracheal deviation present. No thyromegaly present.   Cardiovascular: Normal rate, regular rhythm, normal heart sounds and intact distal pulses. PMI is not displaced. Exam reveals no gallop and no friction rub.   No murmur heard.  Pulmonary/Chest: Effort normal and breath sounds normal. No respiratory distress. She has no wheezes. She has no rales.   Abdominal: Soft. Bowel sounds are normal. She exhibits no distension  "and no mass. There is no tenderness. There is no rebound and no guarding.   Musculoskeletal: She exhibits no edema.   Lymphadenopathy:     She has no cervical adenopathy.   Neurological: She is alert and oriented to person, place, and time. She displays normal reflexes. No cranial nerve deficit. She exhibits normal muscle tone.   Skin: Skin is dry. No rash noted. No pallor.   Psychiatric: She has a normal mood and affect. Her behavior is normal.   Vitals reviewed.      Vitals:    02/07/19 1038   BP: 126/78   BP Location: Right arm   Patient Position: Sitting   BP Method: Large (Manual)   Pulse: 76   Resp: 16   Temp: 98.3 °F (36.8 °C)   TempSrc: Oral   Weight: 41.3 kg (91 lb 0.8 oz)   Height: 4' 11" (1.499 m)       MOST RECENT LABS IN OUR ELECTRONIC MEDICAL RECORD:     Results for orders placed or performed in visit on 08/21/18   Comprehensive metabolic panel   Result Value Ref Range    Sodium 140 136 - 145 mmol/L    Potassium 3.8 3.5 - 5.1 mmol/L    Chloride 106 95 - 110 mmol/L    CO2 24 23 - 29 mmol/L    Glucose 71 70 - 110 mg/dL    BUN, Bld 10 6 - 20 mg/dL    Creatinine 0.6 0.5 - 1.4 mg/dL    Calcium 8.7 8.7 - 10.5 mg/dL    Total Protein 6.4 6.0 - 8.4 g/dL    Albumin 3.5 3.5 - 5.2 g/dL    Total Bilirubin 0.6 0.1 - 1.0 mg/dL    Alkaline Phosphatase 82 55 - 135 U/L    AST 46 (H) 10 - 40 U/L    ALT 28 10 - 44 U/L    Anion Gap 10 8 - 16 mmol/L    eGFR if African American >60.0 >60 mL/min/1.73 m^2    eGFR if non African American >60.0 >60 mL/min/1.73 m^2   Lipid panel   Result Value Ref Range    Cholesterol 215 (H) 120 - 199 mg/dL    Triglycerides 194 (H) 30 - 150 mg/dL    HDL 70 40 - 75 mg/dL    LDL Cholesterol 106.2 63.0 - 159.0 mg/dL    HDL/Chol Ratio 32.6 20.0 - 50.0 %    Total Cholesterol/HDL Ratio 3.1 2.0 - 5.0    Non-HDL Cholesterol 145 mg/dL         Age specific and appropriate preventative healthcare discussed/ health maintenance reviewed. Discussed healthy diet and regular exercise. Discussed age-appropriate " preventative screening tests and recommendations. Discussed recommended follow-up based on age and conditions.

## 2019-02-27 ENCOUNTER — TELEPHONE (OUTPATIENT)
Dept: SMOKING CESSATION | Facility: CLINIC | Age: 54
End: 2019-02-27

## 2019-03-14 ENCOUNTER — TELEPHONE (OUTPATIENT)
Dept: SMOKING CESSATION | Facility: CLINIC | Age: 54
End: 2019-03-14

## 2019-03-21 ENCOUNTER — CLINICAL SUPPORT (OUTPATIENT)
Dept: SMOKING CESSATION | Facility: CLINIC | Age: 54
End: 2019-03-21
Payer: COMMERCIAL

## 2019-03-21 DIAGNOSIS — F17.200 TOBACCO USE DISORDER: Primary | ICD-10-CM

## 2019-03-21 PROCEDURE — 99999 PR PBB SHADOW E&M-EST. PATIENT-LVL II: ICD-10-PCS | Mod: PBBFAC,,,

## 2019-03-21 PROCEDURE — 99404 PREV MED CNSL INDIV APPRX 60: CPT | Mod: S$GLB,,, | Performed by: GENERAL PRACTICE

## 2019-03-21 PROCEDURE — 99999 PR PBB SHADOW E&M-EST. PATIENT-LVL II: CPT | Mod: PBBFAC,,,

## 2019-03-21 PROCEDURE — 99404 PR PREVENT COUNSEL,INDIV,60 MIN: ICD-10-PCS | Mod: S$GLB,,, | Performed by: GENERAL PRACTICE

## 2019-03-21 RX ORDER — IBUPROFEN 200 MG
1 TABLET ORAL DAILY
Qty: 14 PATCH | Refills: 0 | Status: SHIPPED | OUTPATIENT
Start: 2019-03-21 | End: 2019-04-09 | Stop reason: SDUPTHER

## 2019-03-21 NOTE — Clinical Note
Patient will be participating in weekly tobacco cessation meetings and will begin the prescribed tobacco cessation medication regime of the 21 mg patch. Patient has used Chantix, Wellbutrin,patches,lozenges and gum before.

## 2019-04-04 ENCOUNTER — TELEPHONE (OUTPATIENT)
Dept: SMOKING CESSATION | Facility: CLINIC | Age: 54
End: 2019-04-04

## 2019-04-04 NOTE — TELEPHONE ENCOUNTER
I called patient as I received message that she needed to reschedule her follow up but had to leave a message.

## 2019-04-09 ENCOUNTER — CLINICAL SUPPORT (OUTPATIENT)
Dept: SMOKING CESSATION | Facility: CLINIC | Age: 54
End: 2019-04-09
Payer: COMMERCIAL

## 2019-04-09 DIAGNOSIS — F17.200 TOBACCO USE DISORDER: Primary | ICD-10-CM

## 2019-04-09 PROCEDURE — 99404 PREV MED CNSL INDIV APPRX 60: CPT | Mod: S$GLB,,, | Performed by: GENERAL PRACTICE

## 2019-04-09 PROCEDURE — 99999 PR PBB SHADOW E&M-EST. PATIENT-LVL II: ICD-10-PCS | Mod: PBBFAC,,,

## 2019-04-09 PROCEDURE — 99999 PR PBB SHADOW E&M-EST. PATIENT-LVL II: CPT | Mod: PBBFAC,,,

## 2019-04-09 PROCEDURE — 99404 PR PREVENT COUNSEL,INDIV,60 MIN: ICD-10-PCS | Mod: S$GLB,,, | Performed by: GENERAL PRACTICE

## 2019-04-09 RX ORDER — IBUPROFEN 200 MG
1 TABLET ORAL DAILY
Qty: 14 PATCH | Refills: 0 | Status: SHIPPED | OUTPATIENT
Start: 2019-04-09 | End: 2019-04-23 | Stop reason: SDUPTHER

## 2019-04-09 NOTE — PROGRESS NOTES
Individual Follow-Up Form    4/9/2019    Quit Date:     Clinical Status of Patient: Outpatient    Length of Service: 60 minutes    Continuing Medication: yes  Patches    Other Medications: gum     Target Symptoms: Withdrawal and medication side effects. The following were  rated moderate (3) to severe (4) on TCRS:  · Moderate (3): none  · Severe (4): none    Comments: Patient is currently smoking 8 cigarettes per day and using a 21 mg patch and 2 mg gum with no negative side effects at this time. Patient is taking Wellbutrin 75 mg BID prescribed by her PCP Dr Masters with no negative side effects noted. Patient wants to increase the dose and will contact Dr Masters. We discussed strategies,new habits and habitual behaviors at this time. We discussed session one material.    Diagnosis: F17.200    Next Visit: 2 weeks

## 2019-04-09 NOTE — Clinical Note
Patient is currently smoking 8 cigarettes per day and using a 21 mg patch and 2 mg gum with no negative side effects at this time. Patient is taking Wellbutrin 75 mg BID prescribed by her PCP Dr Masters with no negative side effects noted. Patient wants to increase the dose and will contact Dr Masters. We discussed strategies,new habits and habitual behaviors at this time. We discussed session one material.

## 2019-04-23 ENCOUNTER — CLINICAL SUPPORT (OUTPATIENT)
Dept: SMOKING CESSATION | Facility: CLINIC | Age: 54
End: 2019-04-23
Payer: COMMERCIAL

## 2019-04-23 DIAGNOSIS — F17.200 TOBACCO USE DISORDER: Primary | ICD-10-CM

## 2019-04-23 PROCEDURE — 99407 BEHAV CHNG SMOKING > 10 MIN: CPT | Mod: S$GLB,,, | Performed by: GENERAL PRACTICE

## 2019-04-23 PROCEDURE — 99407 PR TOBACCO USE CESSATION INTENSIVE >10 MINUTES: ICD-10-PCS | Mod: S$GLB,,, | Performed by: GENERAL PRACTICE

## 2019-04-23 PROCEDURE — 99999 PR PBB SHADOW E&M-EST. PATIENT-LVL II: CPT | Mod: PBBFAC,,,

## 2019-04-23 PROCEDURE — 99999 PR PBB SHADOW E&M-EST. PATIENT-LVL II: ICD-10-PCS | Mod: PBBFAC,,,

## 2019-04-23 RX ORDER — IBUPROFEN 200 MG
1 TABLET ORAL DAILY
Qty: 14 PATCH | Refills: 0 | Status: SHIPPED | OUTPATIENT
Start: 2019-04-23 | End: 2019-05-16 | Stop reason: SDUPTHER

## 2019-05-03 DIAGNOSIS — Z12.11 COLON CANCER SCREENING: ICD-10-CM

## 2019-05-09 ENCOUNTER — TELEPHONE (OUTPATIENT)
Dept: SMOKING CESSATION | Facility: CLINIC | Age: 54
End: 2019-05-09

## 2019-05-16 ENCOUNTER — CLINICAL SUPPORT (OUTPATIENT)
Dept: SMOKING CESSATION | Facility: CLINIC | Age: 54
End: 2019-05-16
Payer: COMMERCIAL

## 2019-05-16 DIAGNOSIS — F17.200 TOBACCO USE DISORDER: Primary | ICD-10-CM

## 2019-05-16 PROCEDURE — 99999 PR PBB SHADOW E&M-EST. PATIENT-LVL II: CPT | Mod: PBBFAC,,,

## 2019-05-16 PROCEDURE — 99999 PR PBB SHADOW E&M-EST. PATIENT-LVL II: ICD-10-PCS | Mod: PBBFAC,,,

## 2019-05-16 PROCEDURE — 99404 PR PREVENT COUNSEL,INDIV,60 MIN: ICD-10-PCS | Mod: S$GLB,,, | Performed by: GENERAL PRACTICE

## 2019-05-16 PROCEDURE — 99404 PREV MED CNSL INDIV APPRX 60: CPT | Mod: S$GLB,,, | Performed by: GENERAL PRACTICE

## 2019-05-16 RX ORDER — IBUPROFEN 200 MG
1 TABLET ORAL DAILY
Qty: 14 PATCH | Refills: 0 | Status: SHIPPED | OUTPATIENT
Start: 2019-05-16 | End: 2019-05-16

## 2019-05-16 RX ORDER — DIPHENHYDRAMINE HCL 25 MG
CAPSULE ORAL
Qty: 72 EACH | Refills: 0 | Status: SHIPPED | OUTPATIENT
Start: 2019-05-16 | End: 2020-03-24

## 2019-05-16 RX ORDER — IBUPROFEN 200 MG
1 TABLET ORAL DAILY
Qty: 28 PATCH | Refills: 0 | Status: SHIPPED | OUTPATIENT
Start: 2019-05-16 | End: 2019-06-05 | Stop reason: SDUPTHER

## 2019-05-16 NOTE — PROGRESS NOTES
Individual Follow-Up Form    5/16/2019    Quit Date:     Clinical Status of Patient: Outpatient    Length of Service: 60 minutes    Continuing Medication: yes  Patches    Other Medications: 2 mg gum     Target Symptoms: Withdrawal and medication side effects. The following were  rated moderate (3) to severe (4) on TCRS:  · Moderate (3): desire or crave;discussed with patient  · Severe (4): none    Comments: Patient is currently smoking 6 cigarettes per day and taking 75 mg Wellbutrin BID prescribed by Dr Masters. Patient will discuss increasing the dosage with Dr Masters to help with her quit attempt. Patient is using the 21 mg patch and 2 mg gum and I ordered the 4 mg gum at this time. We discussed reduction and habitual behaviors as well as new habits.    Diagnosis: F17.200    Next Visit: 2 weeks

## 2019-05-16 NOTE — Clinical Note
Patient is currently smoking 6 cigarettes per day and taking 75 mg Wellbutrin BID prescribed by Dr Masters. Patient will discuss increasing the dosage with Dr Masters to help with her quit attempt. Patient is using the 21 mg patch and 2 mg gum and I ordered the 4 mg gum at this time. We discussed reduction and habitual behaviors as well as new habits.

## 2019-06-05 ENCOUNTER — CLINICAL SUPPORT (OUTPATIENT)
Dept: SMOKING CESSATION | Facility: CLINIC | Age: 54
End: 2019-06-05
Payer: COMMERCIAL

## 2019-06-05 DIAGNOSIS — F17.200 TOBACCO USE DISORDER: Primary | ICD-10-CM

## 2019-06-05 PROCEDURE — 99407 PR TOBACCO USE CESSATION INTENSIVE >10 MINUTES: ICD-10-PCS | Mod: S$GLB,,, | Performed by: GENERAL PRACTICE

## 2019-06-05 PROCEDURE — 99999 PR PBB SHADOW E&M-EST. PATIENT-LVL II: ICD-10-PCS | Mod: PBBFAC,,,

## 2019-06-05 PROCEDURE — 99407 BEHAV CHNG SMOKING > 10 MIN: CPT | Mod: S$GLB,,, | Performed by: GENERAL PRACTICE

## 2019-06-05 PROCEDURE — 99999 PR PBB SHADOW E&M-EST. PATIENT-LVL II: CPT | Mod: PBBFAC,,,

## 2019-06-05 RX ORDER — IBUPROFEN 200 MG
1 TABLET ORAL DAILY
Qty: 28 PATCH | Refills: 0 | Status: SHIPPED | OUTPATIENT
Start: 2019-06-05 | End: 2020-03-24 | Stop reason: ALTCHOICE

## 2019-07-27 DIAGNOSIS — I10 ESSENTIAL HYPERTENSION: ICD-10-CM

## 2019-07-28 RX ORDER — METOPROLOL SUCCINATE 25 MG/1
TABLET, EXTENDED RELEASE ORAL
Qty: 90 TABLET | Refills: 1 | Status: SHIPPED | OUTPATIENT
Start: 2019-07-28

## 2019-07-30 ENCOUNTER — PATIENT MESSAGE (OUTPATIENT)
Dept: FAMILY MEDICINE | Facility: CLINIC | Age: 54
End: 2019-07-30

## 2019-08-22 ENCOUNTER — PATIENT OUTREACH (OUTPATIENT)
Dept: ADMINISTRATIVE | Facility: HOSPITAL | Age: 54
End: 2019-08-22

## 2019-08-28 DIAGNOSIS — I10 ESSENTIAL HYPERTENSION: ICD-10-CM

## 2019-08-28 RX ORDER — AMLODIPINE BESYLATE 5 MG/1
TABLET ORAL
Qty: 90 TABLET | Refills: 1 | Status: SHIPPED | OUTPATIENT
Start: 2019-08-28

## 2019-09-05 ENCOUNTER — TELEPHONE (OUTPATIENT)
Dept: SMOKING CESSATION | Facility: CLINIC | Age: 54
End: 2019-09-05

## 2019-09-17 ENCOUNTER — TELEPHONE (OUTPATIENT)
Dept: SMOKING CESSATION | Facility: CLINIC | Age: 54
End: 2019-09-17

## 2019-09-17 ENCOUNTER — CLINICAL SUPPORT (OUTPATIENT)
Dept: SMOKING CESSATION | Facility: CLINIC | Age: 54
End: 2019-09-17
Payer: COMMERCIAL

## 2019-09-17 DIAGNOSIS — F17.200 NICOTINE DEPENDENCE: Primary | ICD-10-CM

## 2019-09-17 PROCEDURE — 99407 BEHAV CHNG SMOKING > 10 MIN: CPT | Mod: S$GLB,,, | Performed by: INTERNAL MEDICINE

## 2019-09-17 PROCEDURE — 99407 PR TOBACCO USE CESSATION INTENSIVE >10 MINUTES: ICD-10-PCS | Mod: S$GLB,,, | Performed by: INTERNAL MEDICINE

## 2019-09-17 NOTE — PROGRESS NOTES
Spoke with patient today in regard to smoking cessation progress telephone follow up, she states not tobacco free. Patient states she has cut down on cigarette intake since the program using Wellbutrin prescribed by her PCP. Commended patient on the accomplishment thus far. Patient states she is not ready to schedule an appointment to return to the program at this time. Informed patient of benefit period, future follow up, and contact information if any further help or support is needed. Will resolve episode and complete smart form for Quit attempt #1 and 2. Will complete smart form for 3 and 6 month follow up on quit attempt #3.

## 2019-11-14 ENCOUNTER — PATIENT OUTREACH (OUTPATIENT)
Dept: ADMINISTRATIVE | Facility: HOSPITAL | Age: 54
End: 2019-11-14

## 2019-11-26 ENCOUNTER — PATIENT OUTREACH (OUTPATIENT)
Dept: ADMINISTRATIVE | Facility: HOSPITAL | Age: 54
End: 2019-11-26

## 2020-01-23 ENCOUNTER — TELEPHONE (OUTPATIENT)
Dept: ADMINISTRATIVE | Facility: HOSPITAL | Age: 55
End: 2020-01-23

## 2020-02-21 DIAGNOSIS — Z12.39 BREAST CANCER SCREENING: ICD-10-CM

## 2020-03-19 ENCOUNTER — CLINICAL SUPPORT (OUTPATIENT)
Dept: SMOKING CESSATION | Facility: CLINIC | Age: 55
End: 2020-03-19
Payer: COMMERCIAL

## 2020-03-19 DIAGNOSIS — F17.200 NICOTINE DEPENDENCE: Primary | ICD-10-CM

## 2020-03-19 PROCEDURE — 99407 BEHAV CHNG SMOKING > 10 MIN: CPT | Mod: S$GLB,,,

## 2020-03-19 PROCEDURE — 99407 PR TOBACCO USE CESSATION INTENSIVE >10 MINUTES: ICD-10-PCS | Mod: S$GLB,,,

## 2020-03-19 NOTE — PROGRESS NOTES
Spoke with patient today in regard to smoking cessation progress for 12 month phone follow up on Quit 3. Patient not tobacco free at this time. Patient has scheduled an appointment to return to the program for Quit attempt #4. Informed patient of benefit period, future follow ups, and contact information if any further help or support is needed. Will resolve episode and complete smart form for Quit attempt #3.

## 2020-03-24 ENCOUNTER — CLINICAL SUPPORT (OUTPATIENT)
Dept: SMOKING CESSATION | Facility: CLINIC | Age: 55
End: 2020-03-24
Payer: COMMERCIAL

## 2020-03-24 DIAGNOSIS — F17.200 NICOTINE DEPENDENCE: Primary | ICD-10-CM

## 2020-03-24 PROCEDURE — 99404 PREV MED CNSL INDIV APPRX 60: CPT | Mod: S$GLB,,, | Performed by: GENERAL PRACTICE

## 2020-03-24 PROCEDURE — 99404 PR PREVENT COUNSEL,INDIV,60 MIN: ICD-10-PCS | Mod: S$GLB,,, | Performed by: GENERAL PRACTICE

## 2020-03-24 PROCEDURE — 99999 PR PBB SHADOW E&M-EST. PATIENT-LVL II: CPT | Mod: PBBFAC,,,

## 2020-03-24 PROCEDURE — 99999 PR PBB SHADOW E&M-EST. PATIENT-LVL II: ICD-10-PCS | Mod: PBBFAC,,,

## 2020-03-24 RX ORDER — IBUPROFEN 200 MG
1 TABLET ORAL DAILY
Qty: 14 PATCH | Refills: 0 | Status: SHIPPED | OUTPATIENT
Start: 2020-03-24 | End: 2020-04-22

## 2020-03-24 RX ORDER — BUPROPION HYDROCHLORIDE 150 MG/1
TABLET, EXTENDED RELEASE ORAL
Qty: 53 TABLET | Refills: 0 | Status: SHIPPED | OUTPATIENT
Start: 2020-03-24 | End: 2021-04-12 | Stop reason: DRUGHIGH

## 2020-03-24 NOTE — Clinical Note
Patient will be participating in weekly tobacco cessation meetings and will begin the prescribed tobacco cessation medication regime of the Wellbutrin and 21 mg patch. Patient has used Chantix, Wellbutrin, patches, lozenges and gum before.

## 2020-04-07 ENCOUNTER — TELEPHONE (OUTPATIENT)
Dept: SMOKING CESSATION | Facility: CLINIC | Age: 55
End: 2020-04-07

## 2020-04-22 ENCOUNTER — PATIENT MESSAGE (OUTPATIENT)
Dept: PHARMACY | Facility: CLINIC | Age: 55
End: 2020-04-22

## 2020-04-22 ENCOUNTER — CLINICAL SUPPORT (OUTPATIENT)
Dept: SMOKING CESSATION | Facility: CLINIC | Age: 55
End: 2020-04-22
Payer: COMMERCIAL

## 2020-04-22 DIAGNOSIS — F17.200 NICOTINE DEPENDENCE: Primary | ICD-10-CM

## 2020-04-22 PROCEDURE — 99403 PREV MED CNSL INDIV APPRX 45: CPT | Mod: S$GLB,,, | Performed by: GENERAL PRACTICE

## 2020-04-22 PROCEDURE — 99999 PR PBB SHADOW E&M-EST. PATIENT-LVL II: ICD-10-PCS | Mod: PBBFAC,,,

## 2020-04-22 PROCEDURE — 99403 PR PREVENT COUNSEL,INDIV,45 MIN: ICD-10-PCS | Mod: S$GLB,,, | Performed by: GENERAL PRACTICE

## 2020-04-22 PROCEDURE — 99999 PR PBB SHADOW E&M-EST. PATIENT-LVL II: CPT | Mod: PBBFAC,,,

## 2020-04-22 RX ORDER — IBUPROFEN 200 MG
1 TABLET ORAL DAILY
Qty: 28 PATCH | Refills: 0 | Status: SHIPPED | OUTPATIENT
Start: 2020-04-22 | End: 2020-06-16 | Stop reason: SDUPTHER

## 2020-04-22 NOTE — PROGRESS NOTES
Individual Follow-Up Form    4/22/2020    Quit Date:     Clinical Status of Patient: Outpatient    Length of Service: 45 minutes    Continuing Medication: yes  Wellbutrin    Other Medications: patches     Target Symptoms: Withdrawal and medication side effects. The following were  rated moderate (3) to severe (4) on TCRS:  · Moderate (3): desire or crave;discussed with patient  · Severe (4): none    Comments: Patient is currently smoking 15 cpd and taking Wellbutrin and 21 mg patches with no negative side effects at this time. Patient able to quit at this time due to stress from COVID-19. We discussed at great length health, stress management including relaxation breathing, and strategies to continue with reduction. I will follow up in 2 weeks.    Diagnosis: F17.200    Next Visit: 2 weeks

## 2020-04-22 NOTE — Clinical Note
Patient is currently smoking 15 cpd and taking Wellbutrin and 21 mg patches with no negative side effects at this time. Patient able to quit at this time due to stress from COVID-19. We discussed at great length health, stress management including relaxation breathing, and strategies to continue with reduction. I will follow up in 2 weeks.

## 2020-05-05 ENCOUNTER — CLINICAL SUPPORT (OUTPATIENT)
Dept: SMOKING CESSATION | Facility: CLINIC | Age: 55
End: 2020-05-05
Payer: COMMERCIAL

## 2020-05-05 DIAGNOSIS — F17.200 NICOTINE DEPENDENCE: Primary | ICD-10-CM

## 2020-05-05 PROCEDURE — 99402 PR PREVENT COUNSEL,INDIV,30 MIN: ICD-10-PCS | Mod: S$GLB,,, | Performed by: GENERAL PRACTICE

## 2020-05-05 PROCEDURE — 99999 PR PBB SHADOW E&M-EST. PATIENT-LVL II: ICD-10-PCS | Mod: PBBFAC,,,

## 2020-05-05 PROCEDURE — 99999 PR PBB SHADOW E&M-EST. PATIENT-LVL II: CPT | Mod: PBBFAC,,,

## 2020-05-05 PROCEDURE — 99402 PREV MED CNSL INDIV APPRX 30: CPT | Mod: S$GLB,,, | Performed by: GENERAL PRACTICE

## 2020-05-05 NOTE — PROGRESS NOTES
Individual Follow-Up Form    5/5/2020    Quit Date:     Clinical Status of Patient: Outpatient    Length of Service: 30 minutes    Continuing Medication: yes  Patches    Other Medications:      Target Symptoms: Withdrawal and medication side effects. The following were  rated moderate (3) to severe (4) on TCRS:  · Moderate (3): none  · Severe (4): none    Comments: Patient is currently smoking 10 cpd and using a 21 mg patch with no negative side effects at this time. Patient not taking Wellbutrin at this time due to stomach issues but plans on starting again soon. We discussed strategies to help with her quit as well as stress management at great length.     Diagnosis: F17.200    Next Visit: 2 weeks

## 2020-05-06 ENCOUNTER — PATIENT MESSAGE (OUTPATIENT)
Dept: ADMINISTRATIVE | Facility: HOSPITAL | Age: 55
End: 2020-05-06

## 2020-05-08 ENCOUNTER — PATIENT OUTREACH (OUTPATIENT)
Dept: ADMINISTRATIVE | Facility: HOSPITAL | Age: 55
End: 2020-05-08

## 2020-05-08 DIAGNOSIS — Z12.11 COLON CANCER SCREENING: ICD-10-CM

## 2020-05-19 ENCOUNTER — CLINICAL SUPPORT (OUTPATIENT)
Dept: SMOKING CESSATION | Facility: CLINIC | Age: 55
End: 2020-05-19
Payer: COMMERCIAL

## 2020-05-19 DIAGNOSIS — F17.200 NICOTINE DEPENDENCE: Primary | ICD-10-CM

## 2020-05-19 PROCEDURE — 99999 PR PBB SHADOW E&M-EST. PATIENT-LVL II: CPT | Mod: PBBFAC,,,

## 2020-05-19 PROCEDURE — 99402 PR PREVENT COUNSEL,INDIV,30 MIN: ICD-10-PCS | Mod: S$GLB,,, | Performed by: GENERAL PRACTICE

## 2020-05-19 PROCEDURE — 99402 PREV MED CNSL INDIV APPRX 30: CPT | Mod: S$GLB,,, | Performed by: GENERAL PRACTICE

## 2020-05-19 PROCEDURE — 99999 PR PBB SHADOW E&M-EST. PATIENT-LVL II: ICD-10-PCS | Mod: PBBFAC,,,

## 2020-05-19 NOTE — Clinical Note
Patient is currently smoking 6-7 cpd and using a 21 mg patch and not taking Wellbutrin at this time. Patient was having stomach issues but plans on starting tomorrow. We discussed at great length strategies to help with reduction and new habits. I will follow up via telephone per patient in 2 weeks.

## 2020-05-19 NOTE — PROGRESS NOTES
Individual Follow-Up Form    5/19/2020    Quit Date:     Clinical Status of Patient: Outpatient    Length of Service: 30 minutes    Continuing Medication: yes  Patches    Other Medications: Wellbutrin     Target Symptoms: Withdrawal and medication side effects. The following were  rated moderate (3) to severe (4) on TCRS:  · Moderate (3): none  · Severe (4): none    Comments: Patient is currently smoking 6-7 cpd and using a 21 mg patch and not taking Wellbutrin at this time. Patient was having stomach issues but plans on starting tomorrow. We discussed at great length strategies to help with reduction and new habits. I will follow up via telephone per patient in 2 weeks.    Diagnosis: F17.200    Next Visit: 2 weeks

## 2020-06-02 ENCOUNTER — TELEPHONE (OUTPATIENT)
Dept: SMOKING CESSATION | Facility: CLINIC | Age: 55
End: 2020-06-02

## 2020-06-02 NOTE — TELEPHONE ENCOUNTER
Patient was not able to come for follow up as she is taking her mom to dentist but I will call her later next week. Patient does not need refills at this time.

## 2020-06-16 ENCOUNTER — CLINICAL SUPPORT (OUTPATIENT)
Dept: SMOKING CESSATION | Facility: CLINIC | Age: 55
End: 2020-06-16
Payer: COMMERCIAL

## 2020-06-16 DIAGNOSIS — F17.200 NICOTINE DEPENDENCE: Primary | ICD-10-CM

## 2020-06-16 PROCEDURE — 99402 PREV MED CNSL INDIV APPRX 30: CPT | Mod: S$GLB,,, | Performed by: GENERAL PRACTICE

## 2020-06-16 PROCEDURE — 99999 PR PBB SHADOW E&M-EST. PATIENT-LVL II: ICD-10-PCS | Mod: PBBFAC,,,

## 2020-06-16 PROCEDURE — 99402 PR PREVENT COUNSEL,INDIV,30 MIN: ICD-10-PCS | Mod: S$GLB,,, | Performed by: GENERAL PRACTICE

## 2020-06-16 PROCEDURE — 99999 PR PBB SHADOW E&M-EST. PATIENT-LVL II: CPT | Mod: PBBFAC,,,

## 2020-06-16 RX ORDER — IBUPROFEN 200 MG
1 TABLET ORAL DAILY
Qty: 28 PATCH | Refills: 0 | Status: SHIPPED | OUTPATIENT
Start: 2020-06-16 | End: 2021-04-12 | Stop reason: ALTCHOICE

## 2020-06-16 RX ORDER — ASPIRIN/CALCIUM CARB/MAGNESIUM 325 MG
TABLET ORAL
Qty: 72 LOZENGE | Refills: 0 | Status: SHIPPED | OUTPATIENT
Start: 2020-06-16 | End: 2021-04-12

## 2020-06-16 NOTE — PROGRESS NOTES
Individual Follow-Up Form    6/16/2020    Quit Date:     Clinical Status of Patient: Outpatient    Length of Service: 30 minutes    Continuing Medication: yes  Patches    Other Medications: lozenges     Target Symptoms: Withdrawal and medication side effects. The following were  rated moderate (3) to severe (4) on TCRS:  · Moderate (3): none  · Severe (4): none    Comments: Patient is currently smoking 4-8 cpd and using a 21 mg patch with no negative side effects at this time. Patient not using Wellbutrin and wants to add the lozenges to help with her quit. Patient states she is not ready to quit but hopes in the next 2 weeks to be tobacco free. We discussed at great length medications and dosages as well as habitual behaviors.     Diagnosis: F17.200    Next Visit: 2 weeks

## 2020-06-16 NOTE — Clinical Note
Patient is currently smoking 4-8 cpd and using a 21 mg patch with no negative side effects at this time. Patient not using Wellbutrin and wants to add the lozenges to help with her quit. Patient states she is not ready to quit but hopes in the next 2 weeks to be tobacco free. We discussed at great length medications and dosages as well as habitual behaviors.

## 2020-06-30 ENCOUNTER — PATIENT OUTREACH (OUTPATIENT)
Dept: ADMINISTRATIVE | Facility: HOSPITAL | Age: 55
End: 2020-06-30

## 2020-06-30 NOTE — PROGRESS NOTES
Outreach to patient for colon cancer screening.      Follow up on FIT KIT mailed to patient.    Spoke with patient and reminded her to collect stool specimen and date kit before bringing/ mailing it in; verbalized understanding.

## 2020-07-07 ENCOUNTER — TELEPHONE (OUTPATIENT)
Dept: SMOKING CESSATION | Facility: CLINIC | Age: 55
End: 2020-07-07

## 2020-10-05 ENCOUNTER — PATIENT MESSAGE (OUTPATIENT)
Dept: ADMINISTRATIVE | Facility: HOSPITAL | Age: 55
End: 2020-10-05

## 2021-01-04 ENCOUNTER — PATIENT MESSAGE (OUTPATIENT)
Dept: ADMINISTRATIVE | Facility: HOSPITAL | Age: 56
End: 2021-01-04

## 2021-03-23 ENCOUNTER — CLINICAL SUPPORT (OUTPATIENT)
Dept: SMOKING CESSATION | Facility: CLINIC | Age: 56
End: 2021-03-23
Payer: COMMERCIAL

## 2021-03-23 DIAGNOSIS — F17.200 NICOTINE DEPENDENCE: Primary | ICD-10-CM

## 2021-03-23 PROCEDURE — 99407 PR TOBACCO USE CESSATION INTENSIVE >10 MINUTES: ICD-10-PCS | Mod: S$GLB,,,

## 2021-03-23 PROCEDURE — 99407 BEHAV CHNG SMOKING > 10 MIN: CPT | Mod: S$GLB,,,

## 2021-04-06 ENCOUNTER — PATIENT MESSAGE (OUTPATIENT)
Dept: ADMINISTRATIVE | Facility: HOSPITAL | Age: 56
End: 2021-04-06

## 2021-04-12 ENCOUNTER — CLINICAL SUPPORT (OUTPATIENT)
Dept: SMOKING CESSATION | Facility: CLINIC | Age: 56
End: 2021-04-12
Payer: COMMERCIAL

## 2021-04-12 DIAGNOSIS — F17.200 NICOTINE DEPENDENCE: Primary | ICD-10-CM

## 2021-04-12 PROCEDURE — 99404 PR PREVENT COUNSEL,INDIV,60 MIN: ICD-10-PCS | Mod: 95,,, | Performed by: GENERAL PRACTICE

## 2021-04-12 PROCEDURE — 99404 PREV MED CNSL INDIV APPRX 60: CPT | Mod: 95,,, | Performed by: GENERAL PRACTICE

## 2021-04-12 RX ORDER — BUPROPION HYDROCHLORIDE 150 MG/1
TABLET, EXTENDED RELEASE ORAL
Qty: 53 TABLET | Refills: 0 | Status: SHIPPED | OUTPATIENT
Start: 2021-04-12 | End: 2021-05-12 | Stop reason: DRUGHIGH

## 2021-04-12 RX ORDER — IBUPROFEN 200 MG
1 TABLET ORAL DAILY
Qty: 28 PATCH | Refills: 0 | Status: SHIPPED | OUTPATIENT
Start: 2021-04-12 | End: 2021-05-12 | Stop reason: SDUPTHER

## 2021-05-04 ENCOUNTER — CLINICAL SUPPORT (OUTPATIENT)
Dept: SMOKING CESSATION | Facility: CLINIC | Age: 56
End: 2021-05-04
Payer: COMMERCIAL

## 2021-05-04 DIAGNOSIS — F17.200 NICOTINE DEPENDENCE: Primary | ICD-10-CM

## 2021-05-04 PROCEDURE — 99402 PR PREVENT COUNSEL,INDIV,30 MIN: ICD-10-PCS | Mod: 95,,, | Performed by: GENERAL PRACTICE

## 2021-05-04 PROCEDURE — 99402 PREV MED CNSL INDIV APPRX 30: CPT | Mod: 95,,, | Performed by: GENERAL PRACTICE

## 2021-05-12 ENCOUNTER — TELEPHONE (OUTPATIENT)
Dept: SMOKING CESSATION | Facility: CLINIC | Age: 56
End: 2021-05-12

## 2021-05-12 DIAGNOSIS — F17.200 NICOTINE DEPENDENCE: ICD-10-CM

## 2021-05-12 RX ORDER — IBUPROFEN 200 MG
1 TABLET ORAL DAILY
Qty: 28 PATCH | Refills: 0 | Status: SHIPPED | OUTPATIENT
Start: 2021-05-12 | End: 2021-06-15 | Stop reason: SDUPTHER

## 2021-05-12 RX ORDER — BUPROPION HYDROCHLORIDE 150 MG/1
150 TABLET, EXTENDED RELEASE ORAL 2 TIMES DAILY
Qty: 60 TABLET | Refills: 0 | Status: SHIPPED | OUTPATIENT
Start: 2021-05-12 | End: 2021-06-12

## 2021-05-31 ENCOUNTER — CLINICAL SUPPORT (OUTPATIENT)
Dept: SMOKING CESSATION | Facility: CLINIC | Age: 56
End: 2021-05-31
Payer: COMMERCIAL

## 2021-05-31 DIAGNOSIS — F17.200 NICOTINE DEPENDENCE: Primary | ICD-10-CM

## 2021-05-31 PROCEDURE — 99999 PR PBB SHADOW E&M-EST. PATIENT-LVL II: CPT | Mod: PBBFAC,,,

## 2021-05-31 PROCEDURE — 99999 PR PBB SHADOW E&M-EST. PATIENT-LVL II: ICD-10-PCS | Mod: PBBFAC,,,

## 2021-05-31 PROCEDURE — 99402 PREV MED CNSL INDIV APPRX 30: CPT | Mod: S$GLB,,, | Performed by: GENERAL PRACTICE

## 2021-05-31 PROCEDURE — 99402 PR PREVENT COUNSEL,INDIV,30 MIN: ICD-10-PCS | Mod: S$GLB,,, | Performed by: GENERAL PRACTICE

## 2021-06-15 ENCOUNTER — CLINICAL SUPPORT (OUTPATIENT)
Dept: SMOKING CESSATION | Facility: CLINIC | Age: 56
End: 2021-06-15
Payer: COMMERCIAL

## 2021-06-15 DIAGNOSIS — F17.200 NICOTINE DEPENDENCE: Primary | ICD-10-CM

## 2021-06-15 PROCEDURE — 99402 PR PREVENT COUNSEL,INDIV,30 MIN: ICD-10-PCS | Mod: S$GLB,,, | Performed by: GENERAL PRACTICE

## 2021-06-15 PROCEDURE — 99999 PR PBB SHADOW E&M-EST. PATIENT-LVL II: CPT | Mod: PBBFAC,,,

## 2021-06-15 PROCEDURE — 99402 PREV MED CNSL INDIV APPRX 30: CPT | Mod: S$GLB,,, | Performed by: GENERAL PRACTICE

## 2021-06-15 PROCEDURE — 99999 PR PBB SHADOW E&M-EST. PATIENT-LVL II: ICD-10-PCS | Mod: PBBFAC,,,

## 2021-06-15 RX ORDER — IBUPROFEN 200 MG
1 TABLET ORAL DAILY
Qty: 28 PATCH | Refills: 0 | Status: SHIPPED | OUTPATIENT
Start: 2021-06-15

## 2021-07-07 ENCOUNTER — PATIENT MESSAGE (OUTPATIENT)
Dept: ADMINISTRATIVE | Facility: HOSPITAL | Age: 56
End: 2021-07-07

## 2021-07-19 DIAGNOSIS — M25.532 LEFT WRIST PAIN: Primary | ICD-10-CM

## 2021-07-20 ENCOUNTER — OFFICE VISIT (OUTPATIENT)
Dept: ORTHOPEDICS | Facility: CLINIC | Age: 56
End: 2021-07-20
Payer: COMMERCIAL

## 2021-07-20 ENCOUNTER — HOSPITAL ENCOUNTER (OUTPATIENT)
Dept: RADIOLOGY | Facility: HOSPITAL | Age: 56
Discharge: HOME | End: 2021-07-20
Attending: ORTHOPAEDIC SURGERY
Payer: COMMERCIAL

## 2021-07-20 ENCOUNTER — CLINICAL SUPPORT (OUTPATIENT)
Dept: SMOKING CESSATION | Facility: CLINIC | Age: 56
End: 2021-07-20
Payer: COMMERCIAL

## 2021-07-20 VITALS — WEIGHT: 99 LBS | BODY MASS INDEX: 19.44 KG/M2 | RESPIRATION RATE: 16 BRPM | HEIGHT: 60 IN

## 2021-07-20 DIAGNOSIS — F17.200 NICOTINE DEPENDENCE: Primary | ICD-10-CM

## 2021-07-20 DIAGNOSIS — S52.572A OTHER CLOSED INTRA-ARTICULAR FRACTURE OF DISTAL END OF LEFT RADIUS, INITIAL ENCOUNTER: Primary | ICD-10-CM

## 2021-07-20 DIAGNOSIS — M25.532 LEFT WRIST PAIN: ICD-10-CM

## 2021-07-20 PROCEDURE — 73110 X-RAY EXAM OF WRIST: CPT | Mod: 26,LT,, | Performed by: RADIOLOGY

## 2021-07-20 PROCEDURE — 99999 PR PBB SHADOW E&M-EST. PATIENT-LVL III: ICD-10-PCS | Mod: PBBFAC,,, | Performed by: ORTHOPAEDIC SURGERY

## 2021-07-20 PROCEDURE — 99402 PREV MED CNSL INDIV APPRX 30: CPT | Mod: S$GLB,,, | Performed by: GENERAL PRACTICE

## 2021-07-20 PROCEDURE — 99999 PR PBB SHADOW E&M-EST. PATIENT-LVL III: CPT | Mod: PBBFAC,,, | Performed by: ORTHOPAEDIC SURGERY

## 2021-07-20 PROCEDURE — 99999 PR PBB SHADOW E&M-EST. PATIENT-LVL II: CPT | Mod: PBBFAC,,,

## 2021-07-20 PROCEDURE — 99203 OFFICE O/P NEW LOW 30 MIN: CPT | Mod: S$GLB,,, | Performed by: ORTHOPAEDIC SURGERY

## 2021-07-20 PROCEDURE — 99999 PR PBB SHADOW E&M-EST. PATIENT-LVL II: ICD-10-PCS | Mod: PBBFAC,,,

## 2021-07-20 PROCEDURE — 73110 X-RAY EXAM OF WRIST: CPT | Mod: TC,PN,LT

## 2021-07-20 PROCEDURE — 99402 PR PREVENT COUNSEL,INDIV,30 MIN: ICD-10-PCS | Mod: S$GLB,,, | Performed by: GENERAL PRACTICE

## 2021-07-20 PROCEDURE — 99203 PR OFFICE/OUTPT VISIT, NEW, LEVL III, 30-44 MIN: ICD-10-PCS | Mod: S$GLB,,, | Performed by: ORTHOPAEDIC SURGERY

## 2021-07-20 PROCEDURE — 73110 XR WRIST COMPLETE 3 VIEWS LEFT: ICD-10-PCS | Mod: 26,LT,, | Performed by: RADIOLOGY

## 2021-08-10 DIAGNOSIS — S52.572A OTHER CLOSED INTRA-ARTICULAR FRACTURE OF DISTAL END OF LEFT RADIUS, INITIAL ENCOUNTER: Primary | ICD-10-CM

## 2021-08-25 ENCOUNTER — OFFICE VISIT (OUTPATIENT)
Dept: ORTHOPEDICS | Facility: CLINIC | Age: 56
End: 2021-08-25
Payer: COMMERCIAL

## 2021-08-25 ENCOUNTER — HOSPITAL ENCOUNTER (OUTPATIENT)
Dept: RADIOLOGY | Facility: HOSPITAL | Age: 56
Discharge: HOME | End: 2021-08-25
Attending: ORTHOPAEDIC SURGERY
Payer: COMMERCIAL

## 2021-08-25 VITALS — HEIGHT: 60 IN | WEIGHT: 99 LBS | RESPIRATION RATE: 16 BRPM | BODY MASS INDEX: 19.44 KG/M2

## 2021-08-25 DIAGNOSIS — S52.572A OTHER CLOSED INTRA-ARTICULAR FRACTURE OF DISTAL END OF LEFT RADIUS, INITIAL ENCOUNTER: ICD-10-CM

## 2021-08-25 DIAGNOSIS — S52.572A OTHER CLOSED INTRA-ARTICULAR FRACTURE OF DISTAL END OF LEFT RADIUS, INITIAL ENCOUNTER: Primary | ICD-10-CM

## 2021-08-25 PROCEDURE — 99213 OFFICE O/P EST LOW 20 MIN: CPT | Mod: S$GLB,,, | Performed by: ORTHOPAEDIC SURGERY

## 2021-08-25 PROCEDURE — 99999 PR PBB SHADOW E&M-EST. PATIENT-LVL III: CPT | Mod: PBBFAC,,, | Performed by: ORTHOPAEDIC SURGERY

## 2021-08-25 PROCEDURE — 73110 X-RAY EXAM OF WRIST: CPT | Mod: 26,LT,, | Performed by: RADIOLOGY

## 2021-08-25 PROCEDURE — 73110 XR WRIST COMPLETE 3 VIEWS LEFT: ICD-10-PCS | Mod: 26,LT,, | Performed by: RADIOLOGY

## 2021-08-25 PROCEDURE — 99213 PR OFFICE/OUTPT VISIT, EST, LEVL III, 20-29 MIN: ICD-10-PCS | Mod: S$GLB,,, | Performed by: ORTHOPAEDIC SURGERY

## 2021-08-25 PROCEDURE — 73110 X-RAY EXAM OF WRIST: CPT | Mod: TC,PN,LT

## 2021-08-25 PROCEDURE — 99999 PR PBB SHADOW E&M-EST. PATIENT-LVL III: ICD-10-PCS | Mod: PBBFAC,,, | Performed by: ORTHOPAEDIC SURGERY

## 2022-05-14 NOTE — TELEPHONE ENCOUNTER
3rd attempt left message regarding smoking cessation quit 1 episode phone follow up. Will complete and resolve smart form for 3 attempts.     [facility]        Pt Name: Sherre Olszewski  MRN: [id number]  [place] 1986  Date of evaluation: 5/14/2022  Provider: Darlene Mireles PA-C  PCP: No primary care provider on file. Note Started: 7:44 PM EDT       MYRA. I have evaluated this patient. My supervising physician was available for consultation. CHIEF COMPLAINT       Chief Complaint   Patient presents with    Abscess     Patient states he has an abscess on his anus that he noticed a few days ago.  Back Pain     Patient states he has chronic lower back pain and is having difficulty getting around. Patient denies any recent injuries. HISTORY OF PRESENT ILLNESS   (Location, Timing/Onset, Context/Setting, Quality, Duration, Modifying Factors, Severity, Associated Signs and Symptoms)  Note limiting factors. Chief Complaint: Rectal pain     Sherre Olszewski is a 31 y.o. male who presents for evaluation of rectal pain that started yesterday. Patient is concerned for possible abscess. No history of similar symptoms. No fevers or chills. Abdominal pain, nausea vomiting or diarrhea. Patient states that he also has chronic low back pain. He has not taken anything for this. No new or worsening symptoms. He has no other complaints or concerns at this time    Nursing Notes were all reviewed and agreed with or any disagreements were addressed in the HPI. REVIEW OF SYSTEMS    (2-9 systems for level 4, 10 or more for level 5)     Review of Systems   Constitutional: Negative for appetite change, chills and fever. HENT: Negative for congestion and rhinorrhea. Respiratory: Negative for cough, shortness of breath and wheezing. Cardiovascular: Negative for chest pain. Gastrointestinal: Positive for rectal pain. Negative for abdominal pain, diarrhea, nausea and vomiting. Genitourinary: Negative for difficulty urinating, dysuria and hematuria.    Musculoskeletal: Positive for back pain. Negative for neck pain and neck stiffness. Skin: Negative for rash. Neurological: Negative for weakness, numbness and headaches. Positives and Pertinent negatives as per HPI. Except as noted above in the ROS, all other systems were reviewed and negative. PAST MEDICAL HISTORY   History reviewed. No pertinent past medical history. SURGICAL HISTORY   History reviewed. No pertinent surgical history. CURRENTMEDICATIONS       Previous Medications    No medications on file         ALLERGIES     Patient has no known allergies. FAMILYHISTORY     History reviewed. No pertinent family history. SOCIAL HISTORY       Social History     Tobacco Use    Smoking status: Current Every Day Smoker     Packs/day: 0.50     Types: Cigarettes    Smokeless tobacco: Never Used   Vaping Use    Vaping Use: Never used   Substance Use Topics    Alcohol use: Never    Drug use: Yes     Types: Cocaine, Marijuana (Weed)     Comment: Crystal meth       SCREENINGS    Chelsea Coma Scale  Eye Opening: Spontaneous  Best Verbal Response: Oriented  Best Motor Response: Obeys commands  Chelsea Coma Scale Score: 15        PHYSICAL EXAM    (up to 7 for level 4, 8 or more for level 5)     ED Triage Vitals [05/14/22 1933]   BP Temp Temp Source Pulse Resp SpO2 Height Weight   122/79 98.2 °F (36.8 °C) Oral 101 20 98 % 5' 9\" (1.753 m) 171 lb (77.6 kg)       Physical Exam  Vitals and nursing note reviewed. Exam conducted with a chaperone present. Constitutional:       Appearance: He is well-developed. He is not diaphoretic. HENT:      Head: Normocephalic and atraumatic. Right Ear: External ear normal.      Left Ear: External ear normal.      Nose: Nose normal.   Eyes:      General:         Right eye: No discharge. Left eye: No discharge. Cardiovascular:      Rate and Rhythm: Normal rate and regular rhythm. Heart sounds: Normal heart sounds.    Pulmonary:      Effort: Pulmonary effort is normal. No respiratory distress. Breath sounds: Normal breath sounds. Chest:      Chest wall: No tenderness. Abdominal:      General: There is no distension. Palpations: Abdomen is soft. Tenderness: There is no abdominal tenderness. Genitourinary:     Rectum: Tenderness and anal fissure present. Musculoskeletal:         General: Normal range of motion. Cervical back: Normal range of motion and neck supple. Skin:     General: Skin is warm and dry. Neurological:      Mental Status: He is alert and oriented to person, place, and time. Psychiatric:         Behavior: Behavior normal.         DIAGNOSTIC RESULTS   LABS:    Labs Reviewed - No data to display    When ordered only abnormal lab results are displayed. All other labs were within normal range or not returned as of this dictation. EKG: When ordered, EKG's are interpreted by the Emergency Department Physician in the absence of a cardiologist.  Please see their note for interpretation of EKG. RADIOLOGY:   Non-plain film images such as CT, Ultrasound and MRI are read by the radiologist. Plain radiographic images are visualized and preliminarily interpreted by the ED Provider with the below findings:        Interpretation per the Radiologist below, if available at the time of this note:    No orders to display     No results found. PROCEDURES   Unless otherwise noted below, none     Procedures    CRITICAL CARE TIME       CONSULTS:  None      EMERGENCY DEPARTMENT COURSE and DIFFERENTIAL DIAGNOSIS/MDM:   Vitals:    Vitals:    05/14/22 1933   BP: 122/79   Pulse: 101   Resp: 20   Temp: 98.2 °F (36.8 °C)   TempSrc: Oral   SpO2: 98%   Weight: 171 lb (77.6 kg)   Height: 5' 9\" (1.753 m)       Patient was given the following medications:  Medications - No data to display      Is this patient to be included in the SEP-1 Core Measure due to severe sepsis or septic shock?    No   Exclusion criteria - the patient is NOT to be included for SEP-1 Core Measure due to: Infection is not suspected    Patient presents for evaluation of rectal pain. On exam, he is resting comfortably in bed no acute distress and nontoxic. Vitals are stable and he is afebrile. Patient does have a fissure to the 4 o'clock position of the rectum with associated tenderness and mild induration there is no fluctuance or active drainage. He will be covered empirically with clindamycin. No indication for incision and drainage at this time. He was also given Motrin for symptomatic relief. The patient is otherwise not clinically toxic nor febrile. Differential diagnosis includes but is not limited to erysipelas, osteomyelitis, septic arthritis, lymphadenitis, DVT and necrotizing fasciitis. The appearance of the infection is such that I believe he will improve with oral antibiotics. He was advised to follow-up with their family doctor within the next 24-48 hours and return to the ED if there is no improvement or if the infection starts to worsen in any way. FINAL IMPRESSION      1.  Anal fissure          DISPOSITION/PLAN   DISPOSITION Decision To Discharge 05/14/2022 07:43:47 PM      PATIENT REFERRED TO:  [facility] Emergency Department  [address]  [phone]  Go to   If symptoms worsen      DISCHARGE MEDICATIONS:  New Prescriptions    CLINDAMYCIN (CLEOCIN) 150 MG CAPSULE    Take 3 capsules by mouth 3 times daily for 10 days    IBUPROFEN (ADVIL;MOTRIN) 800 MG TABLET    Take 1 tablet by mouth every 8 hours as needed for Pain or Fever       DISCONTINUED MEDICATIONS:  Discontinued Medications    No medications on file              (Please note that portions of this note were completed with a voice recognition program.  Efforts were made to edit the dictations but occasionally words are mis-transcribed.)    Annette Woodard PA-C (electronically signed)           Adrian Ervin PA-C  05/14/22 1948

## 2022-08-17 ENCOUNTER — OFFICE VISIT (OUTPATIENT)
Dept: OTOLARYNGOLOGY | Facility: CLINIC | Age: 57
End: 2022-08-17
Payer: COMMERCIAL

## 2022-08-17 VITALS — BODY MASS INDEX: 20.65 KG/M2 | WEIGHT: 105.19 LBS | RESPIRATION RATE: 18 BRPM | HEIGHT: 60 IN

## 2022-08-17 DIAGNOSIS — H61.23 BILATERAL IMPACTED CERUMEN: Primary | ICD-10-CM

## 2022-08-17 DIAGNOSIS — H74.8X1 HEMOTYMPANUM, RIGHT: ICD-10-CM

## 2022-08-17 PROCEDURE — 3008F BODY MASS INDEX DOCD: CPT | Mod: CPTII,S$GLB,, | Performed by: PHYSICIAN ASSISTANT

## 2022-08-17 PROCEDURE — 99203 OFFICE O/P NEW LOW 30 MIN: CPT | Mod: 25,S$GLB,, | Performed by: PHYSICIAN ASSISTANT

## 2022-08-17 PROCEDURE — 99999 PR PBB SHADOW E&M-EST. PATIENT-LVL III: CPT | Mod: PBBFAC,,, | Performed by: PHYSICIAN ASSISTANT

## 2022-08-17 PROCEDURE — 1159F MED LIST DOCD IN RCRD: CPT | Mod: CPTII,S$GLB,, | Performed by: PHYSICIAN ASSISTANT

## 2022-08-17 PROCEDURE — 99203 PR OFFICE/OUTPT VISIT, NEW, LEVL III, 30-44 MIN: ICD-10-PCS | Mod: 25,S$GLB,, | Performed by: PHYSICIAN ASSISTANT

## 2022-08-17 PROCEDURE — 1159F PR MEDICATION LIST DOCUMENTED IN MEDICAL RECORD: ICD-10-PCS | Mod: CPTII,S$GLB,, | Performed by: PHYSICIAN ASSISTANT

## 2022-08-17 PROCEDURE — 1160F PR REVIEW ALL MEDS BY PRESCRIBER/CLIN PHARMACIST DOCUMENTED: ICD-10-PCS | Mod: CPTII,S$GLB,, | Performed by: PHYSICIAN ASSISTANT

## 2022-08-17 PROCEDURE — 99999 PR PBB SHADOW E&M-EST. PATIENT-LVL III: ICD-10-PCS | Mod: PBBFAC,,, | Performed by: PHYSICIAN ASSISTANT

## 2022-08-17 PROCEDURE — 1160F RVW MEDS BY RX/DR IN RCRD: CPT | Mod: CPTII,S$GLB,, | Performed by: PHYSICIAN ASSISTANT

## 2022-08-17 PROCEDURE — 69210 EAR CERUMEN REMOVAL: ICD-10-PCS | Mod: S$GLB,,, | Performed by: PHYSICIAN ASSISTANT

## 2022-08-17 PROCEDURE — 3008F PR BODY MASS INDEX (BMI) DOCUMENTED: ICD-10-PCS | Mod: CPTII,S$GLB,, | Performed by: PHYSICIAN ASSISTANT

## 2022-08-17 PROCEDURE — 69210 REMOVE IMPACTED EAR WAX UNI: CPT | Mod: S$GLB,,, | Performed by: PHYSICIAN ASSISTANT

## 2022-08-17 NOTE — PROGRESS NOTES
[facility]    Subjective:      Patient: Dena Stanton Patient PCP: Primary Doctor No         :  1965     Sex:  female      MRN:  [id number]          Date of Visit: 2022      Chief Complaint: Cerumen Impaction/neck cyst      Patient ID: Dena Stanton is a 56 y.o. female who was self-referred for  cerumen impaction/neck cyst . Pt states that she has noticed wax in ears. Pt has history of hearing loss and has hearing aid through outside dispenser. Pt has had left neck cyst for the past year, which she states has been stable in size, but has recently enlarged in the last few weeks. Pt denies purulent drainage or signs of infection from neck cyst. Pt denies fever/chills, ear pain or discharge. Pt denies fever/chills, drenching night sweats or unintentional weight loss.     Past Medical History  She has a past medical history of Coronary artery disease, GERD (gastroesophageal reflux disease), Hearing impaired person, bilateral, Hyperlipidemia, Hypertension, and Myocardial infarction.    Family History  Her family history includes Cancer in her mother; Lung cancer in her mother; Suicide in her father.    Past Surgical History:   Procedure Laterality Date     SECTION      TYMPANOPLASTY       Social History     Tobacco Use    Smoking status: Current Every Day Smoker     Packs/day: 1.00     Years: 36.00     Pack years: 36.00     Types: Cigarettes     Last attempt to quit: 2018     Years since quitting: 3.6    Smokeless tobacco: Never Used   Substance and Sexual Activity    Alcohol use: Yes     Alcohol/week: 3.0 standard drinks     Types: 3 Glasses of wine per week     Comment: pt states about 3 glasses of wine per week    Drug use: No    Sexual activity: Yes     Partners: Male     Comment: long term     Medications  She has a current medication list which includes the following prescription(s): aspirin/salicylamide/caffeine, lansoprazole, nicotine, amlodipine, atorvastatin, bupropion, and  metoprolol succinate.    Review of patient's allergies indicates:   Allergen Reactions    Chantix [varenicline] Other (See Comments)     Mood changes     All medications, allergies, and past history have been reviewed.    Objective:      Vitals:  Vitals - 1 value per visit 8/25/2021 8/17/2022 8/17/2022   SYSTOLIC - - -   DIASTOLIC - - -   Pulse - - -   Temp - - -   Resp 16 - 18   SPO2 - - -   Weight (lb) 99 - 105.16   Weight (kg) 44.906 - 47.7   Height 60 - 60   BMI (Calculated) 19.3 - 20.5   VISIT REPORT - - -   Pain Score  - 0 -       Body surface area is 1.42 meters squared.    Physical Exam  Constitutional:       General: She is not in acute distress.     Appearance: Normal appearance. She is not ill-appearing.   HENT:      Head: Normocephalic and atraumatic.      Right Ear: Ear canal and external ear normal. There is impacted cerumen. Tympanic membrane is scarred (with monomeric changes).      Left Ear: Ear canal and external ear normal. There is impacted cerumen. Tympanic membrane is scarred (with monomeric changes).      Nose: Nose normal.      Mouth/Throat:      Lips: Pink. No lesions.      Mouth: Mucous membranes are moist. No oral lesions.      Tongue: No lesions.      Palate: No lesions.      Pharynx: Oropharynx is clear. Uvula midline. No pharyngeal swelling, oropharyngeal exudate, posterior oropharyngeal erythema or uvula swelling.   Eyes:      General:         Right eye: No discharge.         Left eye: No discharge.      Extraocular Movements: Extraocular movements intact.      Conjunctiva/sclera: Conjunctivae normal.   Neck:        Comments: No cervical lymphadenopathy.   Pulmonary:      Effort: Pulmonary effort is normal.   Lymphadenopathy:      Cervical: No cervical adenopathy.   Neurological:      General: No focal deficit present.      Mental Status: She is alert and oriented to person, place, and time. Mental status is at baseline.   Psychiatric:         Mood and Affect: Mood normal.          Behavior: Behavior normal.         Thought Content: Thought content normal.         Judgment: Judgment normal.   Ear Cerumen Removal     Date/Time: 8/17/2022 11:15 AM  Performed by: Jeffrey Matute PA-C  Authorized by: Jeffrey Matute PA-C      Consent Done?:  Yes (Verbal)     Local anesthetic:  None  Location details:  Both ears  Procedure type comment:  Suction and curette  Cerumen  Removal Results:  Cerumen completely removed  Patient tolerance:  Patient tolerated the procedure well with no immediate complications      Procedure Note:     The patient was brought to the minor procedure room and placed under the operating microscope. Using suction and ear curette, the patient's cerumen impaction was removed from bilateral EACs. The tympanic membranes were evaluated and were unremarkable. The patient tolerated the procedure well. Pt had some right hemotympanum with ear cleaning. Pt states that she takes BC goody powder multiple times a day.     Labs:  WBC   Date Value Ref Range Status   02/07/2019 5.81 3.90 - 12.70 K/uL Final     Platelets   Date Value Ref Range Status   02/07/2019 321 150 - 350 K/uL Final     Creatinine   Date Value Ref Range Status   02/07/2019 0.6 0.5 - 1.4 mg/dL Final     TSH   Date Value Ref Range Status   02/07/2019 2.946 0.400 - 4.000 uIU/mL Final     Glucose   Date Value Ref Range Status   02/07/2019 95 70 - 110 mg/dL Final     All lab results and imaging results have been reviewed.    Assessment:        ICD-10-CM ICD-9-CM   1. Bilateral impacted cerumen  H61.23 380.4   2. Hemotympanum, right  H74.8X1 385.89            Plan:      Bilateral impacted cerumen  -     Ear Cerumen Removal completed in office. Please see procedure note.     Hemotympanum, right  -Pt advised to decrease use of BC goody powder as this can act as blood thinner. Pt advised to follow up in 2 weeks to recheck hemotympanum.     Pt advised that neck cyst appears benign and to proceed with follow up with  dermatology for benign appearing neck cyst.

## 2022-08-18 NOTE — PROCEDURES
Ear Cerumen Removal    Date/Time: 8/17/2022 11:15 AM  Performed by: Jeffrey Matute PA-C  Authorized by: Jeffrey Matute PA-C     Consent Done?:  Yes (Verbal)    Local anesthetic:  None  Location details:  Both ears  Procedure type comment:  Suction and curette  Cerumen  Removal Results:  Cerumen completely removed  Patient tolerance:  Patient tolerated the procedure well with no immediate complications     Procedure Note:    The patient was brought to the minor procedure room and placed under the operating microscope. Using suction and ear curette, the patient's cerumen impaction was removed from bilateral EACs. The tympanic membranes were evaluated and were unremarkable. The patient tolerated the procedure well. Pt had some right hemotympanum with ear cleaning. Pt states that she takes BC goody powder multiple times a day.

## 2022-08-31 ENCOUNTER — OFFICE VISIT (OUTPATIENT)
Dept: OTOLARYNGOLOGY | Facility: CLINIC | Age: 57
End: 2022-08-31
Payer: COMMERCIAL

## 2022-08-31 VITALS — TEMPERATURE: 98 F | HEIGHT: 60 IN | BODY MASS INDEX: 20.52 KG/M2 | WEIGHT: 104.5 LBS

## 2022-08-31 DIAGNOSIS — H74.8X1 HEMOTYMPANUM, RIGHT: Primary | ICD-10-CM

## 2022-08-31 PROCEDURE — 99999 PR PBB SHADOW E&M-EST. PATIENT-LVL III: CPT | Mod: PBBFAC,,, | Performed by: PHYSICIAN ASSISTANT

## 2022-08-31 PROCEDURE — 1160F PR REVIEW ALL MEDS BY PRESCRIBER/CLIN PHARMACIST DOCUMENTED: ICD-10-PCS | Mod: CPTII,S$GLB,, | Performed by: PHYSICIAN ASSISTANT

## 2022-08-31 PROCEDURE — 1160F RVW MEDS BY RX/DR IN RCRD: CPT | Mod: CPTII,S$GLB,, | Performed by: PHYSICIAN ASSISTANT

## 2022-08-31 PROCEDURE — 99999 PR PBB SHADOW E&M-EST. PATIENT-LVL III: ICD-10-PCS | Mod: PBBFAC,,, | Performed by: PHYSICIAN ASSISTANT

## 2022-08-31 PROCEDURE — 3008F BODY MASS INDEX DOCD: CPT | Mod: CPTII,S$GLB,, | Performed by: PHYSICIAN ASSISTANT

## 2022-08-31 PROCEDURE — 3008F PR BODY MASS INDEX (BMI) DOCUMENTED: ICD-10-PCS | Mod: CPTII,S$GLB,, | Performed by: PHYSICIAN ASSISTANT

## 2022-08-31 PROCEDURE — 1159F PR MEDICATION LIST DOCUMENTED IN MEDICAL RECORD: ICD-10-PCS | Mod: CPTII,S$GLB,, | Performed by: PHYSICIAN ASSISTANT

## 2022-08-31 PROCEDURE — 1159F MED LIST DOCD IN RCRD: CPT | Mod: CPTII,S$GLB,, | Performed by: PHYSICIAN ASSISTANT

## 2022-08-31 PROCEDURE — 99213 PR OFFICE/OUTPT VISIT, EST, LEVL III, 20-29 MIN: ICD-10-PCS | Mod: S$GLB,,, | Performed by: PHYSICIAN ASSISTANT

## 2022-08-31 PROCEDURE — 99213 OFFICE O/P EST LOW 20 MIN: CPT | Mod: S$GLB,,, | Performed by: PHYSICIAN ASSISTANT

## 2022-08-31 NOTE — PROGRESS NOTES
[facility]    Subjective:      Patient: Dena Stanton Patient PCP: Primary Doctor No         :  1965     Sex:  female      MRN:  [id number]          Date of Visit: 2022      Chief Complaint: Right hemotympanum follow up    Interval History 2022: Pt had bilateral cerumen removal at last visit and had right sided hemotympanum. Pt states that she has decreased her use of BC goody powder. Pt states that her right ear has been doing fine. Pt denies fever/chills, ear pain/discharge or further hearing loss.    Patient ID: Dena Stanton is a 56 y.o. female who was self-referred for  cerumen impaction/neck cyst . Pt states that she has noticed wax in ears. Pt has history of hearing loss and has hearing aid through outside dispenser. Pt has had left neck cyst for the past year, which she states has been stable in size, but has recently enlarged in the last few weeks. Pt denies purulent drainage or signs of infection from neck cyst. Pt denies fever/chills, ear pain or discharge. Pt denies fever/chills, drenching night sweats or unintentional weight loss.     Past Medical History  She has a past medical history of Coronary artery disease, GERD (gastroesophageal reflux disease), Hearing impaired person, bilateral, Hyperlipidemia, Hypertension, and Myocardial infarction.    Family History  Her family history includes Cancer in her mother; Lung cancer in her mother; Suicide in her father.    Past Surgical History:   Procedure Laterality Date     SECTION      TYMPANOPLASTY       Social History     Tobacco Use    Smoking status: Every Day     Packs/day: 1.00     Years: 36.00     Pack years: 36.00     Types: Cigarettes     Last attempt to quit: 2018     Years since quitting: 3.7    Smokeless tobacco: Never   Substance and Sexual Activity    Alcohol use: Yes     Alcohol/week: 3.0 standard drinks     Types: 3 Glasses of wine per week     Comment: pt states about 3 glasses of wine per week    Drug  use: No    Sexual activity: Yes     Partners: Male     Comment: long term     Medications  She has a current medication list which includes the following prescription(s): amlodipine, aspirin/salicylamide/caffeine, atorvastatin, bupropion, lansoprazole, metoprolol succinate, and nicotine.    Review of patient's allergies indicates:   Allergen Reactions    Chantix [varenicline] Other (See Comments)     Mood changes     All medications, allergies, and past history have been reviewed.    Objective:      Vitals:  Vitals - 1 value per visit 8/17/2022 8/31/2022 8/31/2022   SYSTOLIC - - -   DIASTOLIC - - -   Pulse - - -   Temp - - 97.9   Resp 18 - -   SPO2 - - -   Weight (lb) 105.16 - 104.5   Weight (kg) 47.7 - 47.4   Height 60 - 60   BMI (Calculated) 20.5 - 20.4   VISIT REPORT - - -   Pain Score  - 0 -       Body surface area is 1.42 meters squared.    Physical Exam  Constitutional:       General: She is not in acute distress.     Appearance: Normal appearance. She is not ill-appearing.   HENT:      Head: Normocephalic and atraumatic.      Right Ear: Ear canal and external ear normal. Tympanic membrane is scarred (with monomeric changes).      Left Ear: Ear canal and external ear normal. Tympanic membrane is scarred (with monomeric changes).      Nose: Nose normal.      Mouth/Throat:      Lips: Pink. No lesions.      Mouth: Mucous membranes are moist. No oral lesions.      Tongue: No lesions.      Palate: No lesions.      Pharynx: Oropharynx is clear. Uvula midline. No pharyngeal swelling, oropharyngeal exudate, posterior oropharyngeal erythema or uvula swelling.   Eyes:      General:         Right eye: No discharge.         Left eye: No discharge.      Extraocular Movements: Extraocular movements intact.      Conjunctiva/sclera: Conjunctivae normal.   Neck:        Comments: No cervical lymphadenopathy.   Pulmonary:      Effort: Pulmonary effort is normal.   Lymphadenopathy:      Cervical: No cervical adenopathy.    Neurological:      General: No focal deficit present.      Mental Status: She is alert and oriented to person, place, and time. Mental status is at baseline.   Psychiatric:         Mood and Affect: Mood normal.         Behavior: Behavior normal.         Thought Content: Thought content normal.         Judgment: Judgment normal.     Labs:  WBC   Date Value Ref Range Status   02/07/2019 5.81 3.90 - 12.70 K/uL Final     Platelets   Date Value Ref Range Status   02/07/2019 321 150 - 350 K/uL Final     Creatinine   Date Value Ref Range Status   02/07/2019 0.6 0.5 - 1.4 mg/dL Final     TSH   Date Value Ref Range Status   02/07/2019 2.946 0.400 - 4.000 uIU/mL Final     Glucose   Date Value Ref Range Status   02/07/2019 95 70 - 110 mg/dL Final     All lab results and imaging results have been reviewed.    Assessment:        ICD-10-CM ICD-9-CM   1. Hemotympanum, right-resolved H74.8X1 385.89            Plan:      No hemotympanum today. Ears clear. Proceed with ear cleaning every 6 months and continue following with [facility]ibel for hearing loss and hearing aids. Left neck cyst unchanged. Pt is to proceed with planned dermatology appt. Pt may follow up with ENT sooner for ENT issues/concerns.

## 2023-08-29 ENCOUNTER — OFFICE VISIT (OUTPATIENT)
Dept: OTOLARYNGOLOGY | Facility: CLINIC | Age: 58
End: 2023-08-29
Payer: COMMERCIAL

## 2023-08-29 VITALS
HEART RATE: 93 BPM | RESPIRATION RATE: 18 BRPM | SYSTOLIC BLOOD PRESSURE: 150 MMHG | BODY MASS INDEX: 20 KG/M2 | HEIGHT: 60 IN | DIASTOLIC BLOOD PRESSURE: 89 MMHG | WEIGHT: 101.88 LBS

## 2023-08-29 DIAGNOSIS — H61.23 BILATERAL IMPACTED CERUMEN: Primary | ICD-10-CM

## 2023-08-29 PROCEDURE — 69210 REMOVE IMPACTED EAR WAX UNI: CPT | Mod: S$GLB,,, | Performed by: PHYSICIAN ASSISTANT

## 2023-08-29 PROCEDURE — 99999 PR PBB SHADOW E&M-EST. PATIENT-LVL III: ICD-10-PCS | Mod: PBBFAC,,, | Performed by: PHYSICIAN ASSISTANT

## 2023-08-29 PROCEDURE — 1160F RVW MEDS BY RX/DR IN RCRD: CPT | Mod: CPTII,S$GLB,, | Performed by: PHYSICIAN ASSISTANT

## 2023-08-29 PROCEDURE — 3008F BODY MASS INDEX DOCD: CPT | Mod: CPTII,S$GLB,, | Performed by: PHYSICIAN ASSISTANT

## 2023-08-29 PROCEDURE — 99213 OFFICE O/P EST LOW 20 MIN: CPT | Mod: 25,S$GLB,, | Performed by: PHYSICIAN ASSISTANT

## 2023-08-29 PROCEDURE — 3079F PR MOST RECENT DIASTOLIC BLOOD PRESSURE 80-89 MM HG: ICD-10-PCS | Mod: CPTII,S$GLB,, | Performed by: PHYSICIAN ASSISTANT

## 2023-08-29 PROCEDURE — 1159F PR MEDICATION LIST DOCUMENTED IN MEDICAL RECORD: ICD-10-PCS | Mod: CPTII,S$GLB,, | Performed by: PHYSICIAN ASSISTANT

## 2023-08-29 PROCEDURE — 1160F PR REVIEW ALL MEDS BY PRESCRIBER/CLIN PHARMACIST DOCUMENTED: ICD-10-PCS | Mod: CPTII,S$GLB,, | Performed by: PHYSICIAN ASSISTANT

## 2023-08-29 PROCEDURE — 99213 PR OFFICE/OUTPT VISIT, EST, LEVL III, 20-29 MIN: ICD-10-PCS | Mod: 25,S$GLB,, | Performed by: PHYSICIAN ASSISTANT

## 2023-08-29 PROCEDURE — 99999 PR PBB SHADOW E&M-EST. PATIENT-LVL III: CPT | Mod: PBBFAC,,, | Performed by: PHYSICIAN ASSISTANT

## 2023-08-29 PROCEDURE — 3077F PR MOST RECENT SYSTOLIC BLOOD PRESSURE >= 140 MM HG: ICD-10-PCS | Mod: CPTII,S$GLB,, | Performed by: PHYSICIAN ASSISTANT

## 2023-08-29 PROCEDURE — 3079F DIAST BP 80-89 MM HG: CPT | Mod: CPTII,S$GLB,, | Performed by: PHYSICIAN ASSISTANT

## 2023-08-29 PROCEDURE — 1159F MED LIST DOCD IN RCRD: CPT | Mod: CPTII,S$GLB,, | Performed by: PHYSICIAN ASSISTANT

## 2023-08-29 PROCEDURE — 3008F PR BODY MASS INDEX (BMI) DOCUMENTED: ICD-10-PCS | Mod: CPTII,S$GLB,, | Performed by: PHYSICIAN ASSISTANT

## 2023-08-29 PROCEDURE — 69210 EAR CERUMEN REMOVAL: ICD-10-PCS | Mod: S$GLB,,, | Performed by: PHYSICIAN ASSISTANT

## 2023-08-29 PROCEDURE — 3077F SYST BP >= 140 MM HG: CPT | Mod: CPTII,S$GLB,, | Performed by: PHYSICIAN ASSISTANT

## 2023-08-29 NOTE — PROGRESS NOTES
[facility]    Subjective:      Patient: Dena Stanton Patient PCP: Charis, Primary Doctor         :  1965     Sex:  female      MRN:  [id number]          Date of Visit: 2023      Chief Complaint: Cerumen Impaction (Bilat ear wax removal)    Interval History 2023: Pt goes to [facility] for hearing aids. Pt has bilateral cerumen impaction and requires cleaning. No fever/chills or ear pain/discharge.     Interval History 2022: Pt had bilateral cerumen removal at last visit and had right sided hemotympanum. Pt states that she has decreased her use of BC goody powder. Pt states that her right ear has been doing fine. Pt denies fever/chills, ear pain/discharge or further hearing loss.    Patient ID 2022: Dena Stanton is a 57 y.o. female who was self-referred for  cerumen impaction/neck cyst . Pt states that she has noticed wax in ears. Pt has history of hearing loss and has hearing aid through outside dispenser. Pt has had left neck cyst for the past year, which she states has been stable in size, but has recently enlarged in the last few weeks. Pt denies purulent drainage or signs of infection from neck cyst. Pt denies fever/chills, ear pain or discharge. Pt denies fever/chills, drenching night sweats or unintentional weight loss.     Past Medical History  She has a past medical history of Coronary artery disease, GERD (gastroesophageal reflux disease), Hearing impaired person, bilateral, Hyperlipidemia, Hypertension, and Myocardial infarction.    Family History  Her family history includes Cancer in her mother; Lung cancer in her mother; Suicide in her father.    Past Surgical History:   Procedure Laterality Date     SECTION      TYMPANOPLASTY       Social History     Tobacco Use    Smoking status: Every Day     Current packs/day: 0.00     Average packs/day: 1 pack/day for 36.0 years (36.0 ttl pk-yrs)     Types: Cigarettes     Start date: 1982     Last attempt to quit:  "2018     Years since quittin.7    Smokeless tobacco: Never   Substance and Sexual Activity    Alcohol use: Yes     Alcohol/week: 3.0 standard drinks of alcohol     Types: 3 Glasses of wine per week     Comment: pt states about 3 glasses of wine per week    Drug use: No    Sexual activity: Yes     Partners: Male     Comment: long term     Medications  She has a current medication list which includes the following prescription(s): amlodipine, aspirin/salicylamide/caffeine, atorvastatin, bupropion, lansoprazole, metoprolol succinate, and nicotine.    Review of patient's allergies indicates:   Allergen Reactions    Chantix [varenicline] Other (See Comments)     Mood changes     All medications, allergies, and past history have been reviewed.    Objective:      Vitals:      2022    11:24 AM 2022     1:31 PM 2023     9:23 AM   Vitals - 1 value per visit   SYSTOLIC   150   DIASTOLIC   89   Pulse   93   Temp  97.9 °F (36.6 °C)    Resp 18  18   Weight (lb) 105.16 104.5 101.85   Weight (kg) 47.7 47.4 46.2   Height 5' (1.524 m) 5' (1.524 m) 4' 11.5" (1.511 m)   BMI (Calculated) 20.5 20.4 20.2   Pain Score Zero Zero        Body surface area is 1.39 meters squared.    Physical Exam  Constitutional:       General: She is not in acute distress.     Appearance: Normal appearance. She is not ill-appearing.   HENT:      Head: Normocephalic and atraumatic.      Right Ear: Ear canal and external ear normal. There is impacted cerumen. Tympanic membrane is scarred (with monomeric changes).      Left Ear: Ear canal and external ear normal. There is impacted cerumen. Tympanic membrane is scarred (with monomeric changes).      Nose: Nose normal.      Mouth/Throat:      Lips: Pink. No lesions.      Mouth: Mucous membranes are moist. No oral lesions.      Tongue: No lesions.      Palate: No lesions.      Pharynx: Oropharynx is clear. Uvula midline. No pharyngeal swelling, oropharyngeal exudate, posterior oropharyngeal " erythema or uvula swelling.   Eyes:      General:         Right eye: No discharge.         Left eye: No discharge.      Extraocular Movements: Extraocular movements intact.      Conjunctiva/sclera: Conjunctivae normal.   Neck:        Comments: No cervical lymphadenopathy.   Pulmonary:      Effort: Pulmonary effort is normal.   Lymphadenopathy:      Cervical: No cervical adenopathy.   Neurological:      General: No focal deficit present.      Mental Status: She is alert and oriented to person, place, and time. Mental status is at baseline.   Psychiatric:         Mood and Affect: Mood normal.         Behavior: Behavior normal.         Thought Content: Thought content normal.         Judgment: Judgment normal.     Ear Cerumen Removal     Date/Time: 8/29/2023 9:15 AM     Performed by: Jeffrey Matute PA-C  Authorized by: Jeffrey Matute PA-C    Consent Done?:  Yes (Verbal)     Local anesthetic:  None  Location details:  Both ears  Procedure type comment:  Curette and suction  Cerumen  Removal Results:  Cerumen completely removed  Patient tolerance:  Patient tolerated the procedure well with no immediate complications    Labs:  WBC   Date Value Ref Range Status   02/07/2019 5.81 3.90 - 12.70 K/uL Final     Platelets   Date Value Ref Range Status   02/07/2019 321 150 - 350 K/uL Final     Creatinine   Date Value Ref Range Status   02/07/2019 0.6 0.5 - 1.4 mg/dL Final     TSH   Date Value Ref Range Status   02/07/2019 2.946 0.400 - 4.000 uIU/mL Final     Glucose   Date Value Ref Range Status   02/07/2019 95 70 - 110 mg/dL Final     All lab results and imaging results have been reviewed.    Assessment:         ICD-10-CM ICD-9-CM   1. Bilateral impacted cerumen  H61.23 380.4         Plan:      Bilateral impacted cerumen  -     Ear Cerumen Removal    Bilateral ear cleaning performed today in office. Pt is to proceed with audiograms and hearing aid adjustments through [facility]. Pt will hold on biopsy of neck cyst  at this time. She will follow up in 6 months for routine ear cleaning or sooner as needed for ENT issues/concerns.

## 2023-08-29 NOTE — PROCEDURES
Ear Cerumen Removal    Date/Time: 8/29/2023 9:15 AM    Performed by: Jeffrey Matute PA-C  Authorized by: Jeffrey Matute PA-C    Consent Done?:  Yes (Verbal)    Local anesthetic:  None  Location details:  Both ears  Procedure type comment:  Curette and suction  Cerumen  Removal Results:  Cerumen completely removed  Patient tolerance:  Patient tolerated the procedure well with no immediate complications

## 2024-02-29 ENCOUNTER — OFFICE VISIT (OUTPATIENT)
Dept: OTOLARYNGOLOGY | Facility: CLINIC | Age: 59
End: 2024-02-29
Payer: COMMERCIAL

## 2024-02-29 VITALS
HEART RATE: 90 BPM | WEIGHT: 106.69 LBS | HEIGHT: 60 IN | DIASTOLIC BLOOD PRESSURE: 107 MMHG | BODY MASS INDEX: 20.94 KG/M2 | SYSTOLIC BLOOD PRESSURE: 187 MMHG | RESPIRATION RATE: 16 BRPM

## 2024-02-29 DIAGNOSIS — H61.23 BILATERAL IMPACTED CERUMEN: Primary | ICD-10-CM

## 2024-02-29 DIAGNOSIS — Z86.69 HISTORY OF HEARING LOSS: ICD-10-CM

## 2024-02-29 PROCEDURE — 3077F SYST BP >= 140 MM HG: CPT | Mod: CPTII,S$GLB,, | Performed by: PHYSICIAN ASSISTANT

## 2024-02-29 PROCEDURE — 1160F RVW MEDS BY RX/DR IN RCRD: CPT | Mod: CPTII,S$GLB,, | Performed by: PHYSICIAN ASSISTANT

## 2024-02-29 PROCEDURE — 3008F BODY MASS INDEX DOCD: CPT | Mod: CPTII,S$GLB,, | Performed by: PHYSICIAN ASSISTANT

## 2024-02-29 PROCEDURE — 99999 PR PBB SHADOW E&M-EST. PATIENT-LVL III: CPT | Mod: PBBFAC,,, | Performed by: PHYSICIAN ASSISTANT

## 2024-02-29 PROCEDURE — 3080F DIAST BP >= 90 MM HG: CPT | Mod: CPTII,S$GLB,, | Performed by: PHYSICIAN ASSISTANT

## 2024-02-29 PROCEDURE — 1159F MED LIST DOCD IN RCRD: CPT | Mod: CPTII,S$GLB,, | Performed by: PHYSICIAN ASSISTANT

## 2024-02-29 PROCEDURE — 99213 OFFICE O/P EST LOW 20 MIN: CPT | Mod: S$GLB,,, | Performed by: PHYSICIAN ASSISTANT

## 2024-02-29 NOTE — PROGRESS NOTES
[facility]    Subjective:      Patient: Dena Stanton Patient PCP: Charis, Primary Doctor         :  1965     Sex:  female      MRN:  [id number]          Date of Visit: 2024      Chief Complaint: Cerumen Impaction    Interval History 2024: Pt presents to office for routine 6 month ear cleaning. Pt continues to wear bilateral hearing aids through [facility] for hearing loss. Pt is not having ear pain/discharge or fever/chills.      Interval History 2023: Pt goes to [facility] for hearing aids. Pt has bilateral cerumen impaction and requires cleaning. No fever/chills or ear pain/discharge.     Interval History 2022: Pt had bilateral cerumen removal at last visit and had right sided hemotympanum. Pt states that she has decreased her use of BC goody powder. Pt states that her right ear has been doing fine. Pt denies fever/chills, ear pain/discharge or further hearing loss.    Patient ID 2022: Dena Stanton is a 58 y.o. female who was self-referred for  cerumen impaction/neck cyst . Pt states that she has noticed wax in ears. Pt has history of hearing loss and has hearing aid through outside dispenser. Pt has had left neck cyst for the past year, which she states has been stable in size, but has recently enlarged in the last few weeks. Pt denies purulent drainage or signs of infection from neck cyst. Pt denies fever/chills, ear pain or discharge. Pt denies fever/chills, drenching night sweats or unintentional weight loss.     Past Medical History  She has a past medical history of Coronary artery disease, GERD (gastroesophageal reflux disease), Hearing impaired person, bilateral, Hyperlipidemia, Hypertension, and Myocardial infarction.    Family History  Her family history includes Cancer in her mother; Lung cancer in her mother; Suicide in her father.    Past Surgical History:   Procedure Laterality Date     SECTION      TYMPANOPLASTY       Social History     Tobacco Use    Smoking  "status: Former     Current packs/day: 0.00     Average packs/day: 0.5 packs/day for 36.0 years (18.0 ttl pk-yrs)     Types: Cigarettes     Start date: 1982     Quit date: 2018     Years since quittin.2    Smokeless tobacco: Never   Substance and Sexual Activity    Alcohol use: Yes     Alcohol/week: 3.0 standard drinks of alcohol     Types: 3 Glasses of wine per week     Comment: pt states about 3 glasses of wine per week    Drug use: No    Sexual activity: Yes     Partners: Male     Comment: long term     Medications  She has a current medication list which includes the following prescription(s): amlodipine, aspirin/salicylamide/caffeine, atorvastatin, bupropion, lansoprazole, metoprolol succinate, and nicotine.    Review of patient's allergies indicates:   Allergen Reactions    Chantix [varenicline] Other (See Comments)     Mood changes     All medications, allergies, and past history have been reviewed.    Objective:      Vitals:      2022     1:31 PM 2023     9:23 AM 2024     3:10 PM   Vitals - 1 value per visit   SYSTOLIC  150 187   DIASTOLIC  89 107   Pulse  93 90   Temp 97.9 °F (36.6 °C)     Resp  18 16   Weight (lb) 104.5 101.85 106.7   Weight (kg) 47.4 46.2 48.4   Height 5' (1.524 m) 4' 11.5" (1.511 m) 4' 11.5" (1.511 m)   BMI (Calculated) 20.4 20.2 21.2   Pain Score Zero  Zero       Body surface area is 1.43 meters squared.    Physical Exam  Constitutional:       General: She is not in acute distress.     Appearance: Normal appearance. She is not ill-appearing.   HENT:      Head: Normocephalic and atraumatic.      Right Ear: Ear canal and external ear normal. There is impacted cerumen. Tympanic membrane is scarred (with monomeric changes).      Left Ear: Ear canal and external ear normal. There is impacted cerumen. Tympanic membrane is scarred (with monomeric changes).      Nose: Nose normal.      Mouth/Throat:      Lips: Pink. No lesions.      Mouth: Mucous membranes are " moist. No oral lesions.      Tongue: No lesions.      Palate: No lesions.      Pharynx: Oropharynx is clear. Uvula midline. No pharyngeal swelling, oropharyngeal exudate, posterior oropharyngeal erythema or uvula swelling.   Eyes:      General:         Right eye: No discharge.         Left eye: No discharge.      Extraocular Movements: Extraocular movements intact.      Conjunctiva/sclera: Conjunctivae normal.   Neck:        Comments: No cervical lymphadenopathy.   Pulmonary:      Effort: Pulmonary effort is normal.   Lymphadenopathy:      Cervical: No cervical adenopathy.   Neurological:      General: No focal deficit present.      Mental Status: She is alert and oriented to person, place, and time. Mental status is at baseline.   Psychiatric:         Mood and Affect: Mood normal.         Behavior: Behavior normal.         Thought Content: Thought content normal.         Judgment: Judgment normal.       Ear Cerumen Removal     Date/Time: 2/29/2024 3:00 PM     Performed by: Jeffrey Matute PA-C  Authorized by: Jeffrey Matute PA-C       Local anesthetic:  None  Location details:  Both ears  Procedure type comment:  Curette and suction  Cerumen  Removal Results:  Cerumen completely removed  Patient tolerance:  Patient tolerated the procedure well with no immediate complications       Labs:  WBC   Date Value Ref Range Status   02/07/2019 5.81 3.90 - 12.70 K/uL Final     Platelets   Date Value Ref Range Status   02/07/2019 321 150 - 350 K/uL Final     Creatinine   Date Value Ref Range Status   02/07/2019 0.6 0.5 - 1.4 mg/dL Final     TSH   Date Value Ref Range Status   02/07/2019 2.946 0.400 - 4.000 uIU/mL Final     Glucose   Date Value Ref Range Status   02/07/2019 95 70 - 110 mg/dL Final     All lab results and imaging results have been reviewed.    Assessment:       ICD-10-CM ICD-9-CM   1. Bilateral impacted cerumen  H61.23 380.4   2. History of hearing loss  Z86.69 V12.49        Plan:      Bilateral  impacted cerumen  -     Ear Cerumen Removal: Bilateral ear cleaning performed today in office.     History of hearing loss  -Pt is followed by [facility] for hearing loss and bilateral hearing aids.     Follow up in 6 months for routine ear cleaning. Pt may follow up sooner if having ENT issues/concerns.

## 2024-03-04 NOTE — PROCEDURES
Ear Cerumen Removal    Date/Time: 2/29/2024 3:00 PM    Performed by: Jeffrey Matute PA-C  Authorized by: Jeffrey Matute PA-C      Local anesthetic:  None  Location details:  Both ears  Procedure type comment:  Curette and suction  Cerumen  Removal Results:  Cerumen completely removed  Patient tolerance:  Patient tolerated the procedure well with no immediate complications

## 2024-05-02 ENCOUNTER — HOSPITAL ENCOUNTER (OUTPATIENT)
Facility: HOSPITAL | Age: 59
Discharge: HOME | End: 2024-05-03
Attending: EMERGENCY MEDICINE | Admitting: HOSPITALIST
Payer: COMMERCIAL

## 2024-05-02 DIAGNOSIS — E83.42 HYPOMAGNESEMIA: ICD-10-CM

## 2024-05-02 DIAGNOSIS — E78.5 HYPERLIPIDEMIA, UNSPECIFIED HYPERLIPIDEMIA TYPE: ICD-10-CM

## 2024-05-02 DIAGNOSIS — R07.9 CHEST PAIN: ICD-10-CM

## 2024-05-02 DIAGNOSIS — E87.6 HYPOKALEMIA: ICD-10-CM

## 2024-05-02 DIAGNOSIS — E83.51 HYPOCALCEMIA: ICD-10-CM

## 2024-05-02 DIAGNOSIS — R20.2 PARESTHESIAS: Primary | ICD-10-CM

## 2024-05-02 DIAGNOSIS — I10 ESSENTIAL HYPERTENSION: ICD-10-CM

## 2024-05-02 PROBLEM — F17.210 TOBACCO DEPENDENCE DUE TO CIGARETTES: Status: ACTIVE | Noted: 2024-05-02

## 2024-05-02 PROBLEM — R74.01 TRANSAMINITIS: Status: ACTIVE | Noted: 2024-05-02

## 2024-05-02 PROBLEM — R63.0 ANOREXIA: Status: ACTIVE | Noted: 2024-05-02

## 2024-05-02 LAB
ALBUMIN SERPL BCP-MCNC: 3.5 G/DL (ref 3.5–5.2)
ALP SERPL-CCNC: 93 U/L (ref 55–135)
ALT SERPL W/O P-5'-P-CCNC: 91 U/L (ref 10–44)
AMMONIA PLAS-SCNC: 22 UMOL/L (ref 10–50)
ANION GAP SERPL CALC-SCNC: 15 MMOL/L (ref 8–16)
ANISOCYTOSIS BLD QL SMEAR: SLIGHT
AST SERPL-CCNC: 266 U/L (ref 10–40)
BASOPHILS # BLD AUTO: 0.09 K/UL (ref 0–0.2)
BASOPHILS NFR BLD: 1.3 % (ref 0–1.9)
BILIRUB SERPL-MCNC: 0.4 MG/DL (ref 0.1–1)
BNP SERPL-MCNC: 35 PG/ML (ref 0–99)
BUN SERPL-MCNC: 6 MG/DL (ref 6–20)
CALCIUM SERPL-MCNC: 8.1 MG/DL (ref 8.7–10.5)
CHLORIDE SERPL-SCNC: 97 MMOL/L (ref 95–110)
CO2 SERPL-SCNC: 26 MMOL/L (ref 23–29)
CREAT SERPL-MCNC: 0.6 MG/DL (ref 0.5–1.4)
DIFFERENTIAL METHOD BLD: ABNORMAL
EOSINOPHIL # BLD AUTO: 0.1 K/UL (ref 0–0.5)
EOSINOPHIL NFR BLD: 1.8 % (ref 0–8)
ERYTHROCYTE [DISTWIDTH] IN BLOOD BY AUTOMATED COUNT: 18.8 % (ref 11.5–14.5)
EST. GFR  (NO RACE VARIABLE): >60 ML/MIN/1.73 M^2
GLUCOSE SERPL-MCNC: 101 MG/DL (ref 70–110)
HCT VFR BLD AUTO: 37.6 % (ref 37–48.5)
HGB BLD-MCNC: 12.5 G/DL (ref 12–16)
IMM GRANULOCYTES # BLD AUTO: 0.04 K/UL (ref 0–0.04)
IMM GRANULOCYTES NFR BLD AUTO: 0.6 % (ref 0–0.5)
LIPASE SERPL-CCNC: 37 U/L (ref 4–60)
LYMPHOCYTES # BLD AUTO: 1.1 K/UL (ref 1–4.8)
LYMPHOCYTES NFR BLD: 16 % (ref 18–48)
MAGNESIUM SERPL-MCNC: <0.7 MG/DL (ref 1.6–2.6)
MCH RBC QN AUTO: 28.8 PG (ref 27–31)
MCHC RBC AUTO-ENTMCNC: 33.2 G/DL (ref 32–36)
MCV RBC AUTO: 87 FL (ref 82–98)
MONOCYTES # BLD AUTO: 0.7 K/UL (ref 0.3–1)
MONOCYTES NFR BLD: 10 % (ref 4–15)
NEUTROPHILS # BLD AUTO: 4.7 K/UL (ref 1.8–7.7)
NEUTROPHILS NFR BLD: 70.3 % (ref 38–73)
NRBC BLD-RTO: 0 /100 WBC
PHOSPHATE SERPL-MCNC: 2.9 MG/DL (ref 2.7–4.5)
PLATELET # BLD AUTO: 283 K/UL (ref 150–450)
PLATELET BLD QL SMEAR: ABNORMAL
PMV BLD AUTO: 11.7 FL (ref 9.2–12.9)
POTASSIUM SERPL-SCNC: 2.8 MMOL/L (ref 3.5–5.1)
PROT SERPL-MCNC: 7.8 G/DL (ref 6–8.4)
RBC # BLD AUTO: 4.34 M/UL (ref 4–5.4)
SODIUM SERPL-SCNC: 138 MMOL/L (ref 136–145)
TARGETS BLD QL SMEAR: ABNORMAL
TROPONIN I SERPL DL<=0.01 NG/ML-MCNC: <0.006 NG/ML (ref 0–0.03)
TSH SERPL DL<=0.005 MIU/L-ACNC: 2.9 UIU/ML (ref 0.4–4)
WBC # BLD AUTO: 6.7 K/UL (ref 3.9–12.7)

## 2024-05-02 PROCEDURE — 63600175 PHARM REV CODE 636 W HCPCS: Performed by: EMERGENCY MEDICINE

## 2024-05-02 PROCEDURE — 96375 TX/PRO/DX INJ NEW DRUG ADDON: CPT

## 2024-05-02 PROCEDURE — 99291 CRITICAL CARE FIRST HOUR: CPT

## 2024-05-02 PROCEDURE — G0378 HOSPITAL OBSERVATION PER HR: HCPCS

## 2024-05-02 PROCEDURE — 36415 COLL VENOUS BLD VENIPUNCTURE: CPT

## 2024-05-02 PROCEDURE — 80074 ACUTE HEPATITIS PANEL: CPT

## 2024-05-02 PROCEDURE — 82140 ASSAY OF AMMONIA: CPT

## 2024-05-02 PROCEDURE — 84484 ASSAY OF TROPONIN QUANT: CPT | Mod: 91 | Performed by: EMERGENCY MEDICINE

## 2024-05-02 PROCEDURE — 96372 THER/PROPH/DIAG INJ SC/IM: CPT | Mod: 59

## 2024-05-02 PROCEDURE — 96365 THER/PROPH/DIAG IV INF INIT: CPT

## 2024-05-02 PROCEDURE — 83735 ASSAY OF MAGNESIUM: CPT | Performed by: EMERGENCY MEDICINE

## 2024-05-02 PROCEDURE — 83690 ASSAY OF LIPASE: CPT

## 2024-05-02 PROCEDURE — 25000003 PHARM REV CODE 250: Performed by: EMERGENCY MEDICINE

## 2024-05-02 PROCEDURE — 96366 THER/PROPH/DIAG IV INF ADDON: CPT

## 2024-05-02 PROCEDURE — 99900035 HC TECH TIME PER 15 MIN (STAT)

## 2024-05-02 PROCEDURE — 63600175 PHARM REV CODE 636 W HCPCS

## 2024-05-02 PROCEDURE — 84443 ASSAY THYROID STIM HORMONE: CPT | Performed by: EMERGENCY MEDICINE

## 2024-05-02 PROCEDURE — 83880 ASSAY OF NATRIURETIC PEPTIDE: CPT | Performed by: EMERGENCY MEDICINE

## 2024-05-02 PROCEDURE — 94760 N-INVAS EAR/PLS OXIMETRY 1: CPT

## 2024-05-02 PROCEDURE — 25000003 PHARM REV CODE 250

## 2024-05-02 PROCEDURE — 93005 ELECTROCARDIOGRAM TRACING: CPT

## 2024-05-02 PROCEDURE — 85025 COMPLETE CBC W/AUTO DIFF WBC: CPT | Performed by: EMERGENCY MEDICINE

## 2024-05-02 PROCEDURE — 93010 ELECTROCARDIOGRAM REPORT: CPT | Mod: ,,, | Performed by: GENERAL PRACTICE

## 2024-05-02 PROCEDURE — 84484 ASSAY OF TROPONIN QUANT: CPT

## 2024-05-02 PROCEDURE — 84100 ASSAY OF PHOSPHORUS: CPT

## 2024-05-02 PROCEDURE — 80053 COMPREHEN METABOLIC PANEL: CPT | Performed by: EMERGENCY MEDICINE

## 2024-05-02 PROCEDURE — 36415 COLL VENOUS BLD VENIPUNCTURE: CPT | Performed by: EMERGENCY MEDICINE

## 2024-05-02 PROCEDURE — 96367 TX/PROPH/DG ADDL SEQ IV INF: CPT

## 2024-05-02 RX ORDER — SODIUM,POTASSIUM PHOSPHATES 280-250MG
2 POWDER IN PACKET (EA) ORAL
Status: DISCONTINUED | OUTPATIENT
Start: 2024-05-02 | End: 2024-05-03 | Stop reason: HOSPADM

## 2024-05-02 RX ORDER — SIMETHICONE 80 MG
1 TABLET,CHEWABLE ORAL 4 TIMES DAILY PRN
Status: DISCONTINUED | OUTPATIENT
Start: 2024-05-02 | End: 2024-05-03 | Stop reason: HOSPADM

## 2024-05-02 RX ORDER — LANOLIN ALCOHOL/MO/W.PET/CERES
800 CREAM (GRAM) TOPICAL
Status: DISCONTINUED | OUTPATIENT
Start: 2024-05-02 | End: 2024-05-03 | Stop reason: HOSPADM

## 2024-05-02 RX ORDER — CALCIUM CARBONATE 200(500)MG
1000 TABLET,CHEWABLE ORAL 3 TIMES DAILY
Status: DISCONTINUED | OUTPATIENT
Start: 2024-05-02 | End: 2024-05-03 | Stop reason: HOSPADM

## 2024-05-02 RX ORDER — IBUPROFEN 200 MG
16 TABLET ORAL
Status: DISCONTINUED | OUTPATIENT
Start: 2024-05-02 | End: 2024-05-03 | Stop reason: HOSPADM

## 2024-05-02 RX ORDER — POTASSIUM CHLORIDE 7.45 MG/ML
60 INJECTION INTRAVENOUS
Status: CANCELLED | OUTPATIENT
Start: 2024-05-02

## 2024-05-02 RX ORDER — ONDANSETRON HYDROCHLORIDE 2 MG/ML
4 INJECTION, SOLUTION INTRAVENOUS EVERY 8 HOURS PRN
Status: DISCONTINUED | OUTPATIENT
Start: 2024-05-02 | End: 2024-05-03 | Stop reason: HOSPADM

## 2024-05-02 RX ORDER — ENOXAPARIN SODIUM 100 MG/ML
40 INJECTION SUBCUTANEOUS EVERY 24 HOURS
Status: DISCONTINUED | OUTPATIENT
Start: 2024-05-02 | End: 2024-05-03 | Stop reason: HOSPADM

## 2024-05-02 RX ORDER — CALCIUM GLUCONATE 20 MG/ML
1 INJECTION, SOLUTION INTRAVENOUS
Status: CANCELLED | OUTPATIENT
Start: 2024-05-02

## 2024-05-02 RX ORDER — ALUMINUM HYDROXIDE, MAGNESIUM HYDROXIDE, AND SIMETHICONE 1200; 120; 1200 MG/30ML; MG/30ML; MG/30ML
30 SUSPENSION ORAL 4 TIMES DAILY PRN
Status: DISCONTINUED | OUTPATIENT
Start: 2024-05-02 | End: 2024-05-03 | Stop reason: HOSPADM

## 2024-05-02 RX ORDER — HYDROXYZINE PAMOATE 25 MG/1
25 CAPSULE ORAL NIGHTLY PRN
Status: DISCONTINUED | OUTPATIENT
Start: 2024-05-02 | End: 2024-05-03 | Stop reason: HOSPADM

## 2024-05-02 RX ORDER — CALCIUM GLUCONATE 20 MG/ML
3 INJECTION, SOLUTION INTRAVENOUS
Status: CANCELLED | OUTPATIENT
Start: 2024-05-02

## 2024-05-02 RX ORDER — ACETAMINOPHEN 325 MG/1
650 TABLET ORAL EVERY 4 HOURS PRN
Status: DISCONTINUED | OUTPATIENT
Start: 2024-05-02 | End: 2024-05-03 | Stop reason: HOSPADM

## 2024-05-02 RX ORDER — IBUPROFEN 200 MG
1 TABLET ORAL DAILY
Status: DISCONTINUED | OUTPATIENT
Start: 2024-05-03 | End: 2024-05-03 | Stop reason: HOSPADM

## 2024-05-02 RX ORDER — IBUPROFEN 200 MG
24 TABLET ORAL
Status: DISCONTINUED | OUTPATIENT
Start: 2024-05-02 | End: 2024-05-03 | Stop reason: HOSPADM

## 2024-05-02 RX ORDER — HYDRALAZINE HYDROCHLORIDE 20 MG/ML
5 INJECTION INTRAMUSCULAR; INTRAVENOUS ONCE
Status: COMPLETED | OUTPATIENT
Start: 2024-05-02 | End: 2024-05-02

## 2024-05-02 RX ORDER — MAGNESIUM SULFATE HEPTAHYDRATE 40 MG/ML
2 INJECTION, SOLUTION INTRAVENOUS
Status: CANCELLED | OUTPATIENT
Start: 2024-05-02

## 2024-05-02 RX ORDER — METOPROLOL SUCCINATE 25 MG/1
25 TABLET, EXTENDED RELEASE ORAL DAILY
Status: DISCONTINUED | OUTPATIENT
Start: 2024-05-02 | End: 2024-05-03 | Stop reason: HOSPADM

## 2024-05-02 RX ORDER — MAGNESIUM SULFATE HEPTAHYDRATE 40 MG/ML
2 INJECTION, SOLUTION INTRAVENOUS ONCE
Status: COMPLETED | OUTPATIENT
Start: 2024-05-02 | End: 2024-05-03

## 2024-05-02 RX ORDER — CALCIUM GLUCONATE 20 MG/ML
1 INJECTION, SOLUTION INTRAVENOUS
Status: COMPLETED | OUTPATIENT
Start: 2024-05-02 | End: 2024-05-02

## 2024-05-02 RX ORDER — POTASSIUM CHLORIDE 7.45 MG/ML
80 INJECTION INTRAVENOUS
Status: CANCELLED | OUTPATIENT
Start: 2024-05-02

## 2024-05-02 RX ORDER — GLUCAGON 1 MG
1 KIT INJECTION
Status: DISCONTINUED | OUTPATIENT
Start: 2024-05-02 | End: 2024-05-03 | Stop reason: HOSPADM

## 2024-05-02 RX ORDER — NALOXONE HCL 0.4 MG/ML
0.02 VIAL (ML) INJECTION
Status: DISCONTINUED | OUTPATIENT
Start: 2024-05-02 | End: 2024-05-03 | Stop reason: HOSPADM

## 2024-05-02 RX ORDER — CALCIUM GLUCONATE 20 MG/ML
2 INJECTION, SOLUTION INTRAVENOUS
Status: CANCELLED | OUTPATIENT
Start: 2024-05-02

## 2024-05-02 RX ORDER — POTASSIUM CHLORIDE 7.45 MG/ML
10 INJECTION INTRAVENOUS
Status: COMPLETED | OUTPATIENT
Start: 2024-05-02 | End: 2024-05-02

## 2024-05-02 RX ORDER — MAGNESIUM SULFATE HEPTAHYDRATE 40 MG/ML
4 INJECTION, SOLUTION INTRAVENOUS
Status: CANCELLED | OUTPATIENT
Start: 2024-05-02

## 2024-05-02 RX ORDER — SODIUM CHLORIDE 0.9 % (FLUSH) 0.9 %
10 SYRINGE (ML) INJECTION EVERY 12 HOURS PRN
Status: DISCONTINUED | OUTPATIENT
Start: 2024-05-02 | End: 2024-05-03 | Stop reason: HOSPADM

## 2024-05-02 RX ORDER — ATORVASTATIN CALCIUM 40 MG/1
80 TABLET, FILM COATED ORAL NIGHTLY
Status: DISCONTINUED | OUTPATIENT
Start: 2024-05-02 | End: 2024-05-03 | Stop reason: HOSPADM

## 2024-05-02 RX ORDER — POTASSIUM CHLORIDE 7.45 MG/ML
40 INJECTION INTRAVENOUS
Status: CANCELLED | OUTPATIENT
Start: 2024-05-02

## 2024-05-02 RX ORDER — AMLODIPINE BESYLATE 5 MG/1
5 TABLET ORAL DAILY
Status: DISCONTINUED | OUTPATIENT
Start: 2024-05-02 | End: 2024-05-03 | Stop reason: HOSPADM

## 2024-05-02 RX ORDER — POTASSIUM CHLORIDE 7.45 MG/ML
10 INJECTION INTRAVENOUS ONCE
Status: COMPLETED | OUTPATIENT
Start: 2024-05-02 | End: 2024-05-02

## 2024-05-02 RX ORDER — IPRATROPIUM BROMIDE AND ALBUTEROL SULFATE 2.5; .5 MG/3ML; MG/3ML
3 SOLUTION RESPIRATORY (INHALATION) EVERY 6 HOURS PRN
Status: DISCONTINUED | OUTPATIENT
Start: 2024-05-02 | End: 2024-05-03 | Stop reason: HOSPADM

## 2024-05-02 RX ORDER — PANTOPRAZOLE SODIUM 40 MG/1
40 TABLET, DELAYED RELEASE ORAL DAILY
Status: DISCONTINUED | OUTPATIENT
Start: 2024-05-02 | End: 2024-05-03 | Stop reason: HOSPADM

## 2024-05-02 RX ORDER — MAGNESIUM SULFATE HEPTAHYDRATE 40 MG/ML
2 INJECTION, SOLUTION INTRAVENOUS
Status: COMPLETED | OUTPATIENT
Start: 2024-05-02 | End: 2024-05-02

## 2024-05-02 RX ORDER — OMEPRAZOLE 20 MG/1
20 CAPSULE, DELAYED RELEASE ORAL DAILY
COMMUNITY

## 2024-05-02 RX ORDER — TALC
6 POWDER (GRAM) TOPICAL NIGHTLY PRN
Status: DISCONTINUED | OUTPATIENT
Start: 2024-05-02 | End: 2024-05-03 | Stop reason: HOSPADM

## 2024-05-02 RX ADMIN — CALCIUM CARBONATE (ANTACID) CHEW TAB 500 MG 1000 MG: 500 CHEW TAB at 11:05

## 2024-05-02 RX ADMIN — POTASSIUM CHLORIDE 10 MEQ: 7.46 INJECTION, SOLUTION INTRAVENOUS at 08:05

## 2024-05-02 RX ADMIN — HYDRALAZINE HYDROCHLORIDE 5 MG: 20 INJECTION INTRAMUSCULAR; INTRAVENOUS at 05:05

## 2024-05-02 RX ADMIN — ATORVASTATIN CALCIUM 80 MG: 40 TABLET, FILM COATED ORAL at 11:05

## 2024-05-02 RX ADMIN — ENOXAPARIN SODIUM 40 MG: 40 INJECTION SUBCUTANEOUS at 11:05

## 2024-05-02 RX ADMIN — AMLODIPINE BESYLATE 5 MG: 5 TABLET ORAL at 11:05

## 2024-05-02 RX ADMIN — MAGNESIUM SULFATE HEPTAHYDRATE 2 G: 40 INJECTION, SOLUTION INTRAVENOUS at 08:05

## 2024-05-02 RX ADMIN — POTASSIUM BICARBONATE 50 MEQ: 977.5 TABLET, EFFERVESCENT ORAL at 07:05

## 2024-05-02 RX ADMIN — METOPROLOL SUCCINATE 25 MG: 25 TABLET, EXTENDED RELEASE ORAL at 11:05

## 2024-05-02 RX ADMIN — POTASSIUM CHLORIDE 10 MEQ: 7.46 INJECTION, SOLUTION INTRAVENOUS at 11:05

## 2024-05-02 RX ADMIN — CALCIUM GLUCONATE 1 G: 20 INJECTION, SOLUTION INTRAVENOUS at 08:05

## 2024-05-02 NOTE — ED NOTES
Patient presented to ED with complaints of numbness and tingling in her face and bilateral extremities. Stated that she is feeling less numbness since arrival

## 2024-05-02 NOTE — ED PROVIDER NOTES
Encounter Date: 2024       History     Chief Complaint   Patient presents with    Numbness     To bilateral arms, legs and face x several hours; recent arthritis attack      58-year-old female with history of gastroesophageal reflux, hypertension, hyperlipidemia, hard of hearing, coronary artery disease history of angioplasty has been off her blood pressure medicines for several months.  Has no primary care provider.  Patient presents emergency department with complaint of paresthesia to the mouth bilateral upper extremities and lower extremities.  Patient states symptoms began a proximally at 12 noon.  Patient denies any blurred vision, no difficulty with speech or swallowing.  No muscle weakness to upper lower extremities.  Patient does state that she has been experiencing spasms to her right hand and left hand as well.  Emergency department patient found with carpopedal spasm to left upper extremity with blood pressure cuff inflation.  She denies any other focal deficits.  Denies any headache, no blurred vision, no chest pain or shortness of breath.  Denies any other constitutional symptoms.  Patient states has not had any nausea, vomiting, no diarrhea.  She does endorse decreased p.o. intake over last several days as well however.      Review of patient's allergies indicates:   Allergen Reactions    Chantix [varenicline] Other (See Comments)     Mood changes     Past Medical History:   Diagnosis Date    Coronary artery disease     GERD (gastroesophageal reflux disease)     Hearing impaired person, bilateral     Hyperlipidemia     Hypertension     Myocardial infarction      Past Surgical History:   Procedure Laterality Date     SECTION      TYMPANOPLASTY       Family History   Problem Relation Name Age of Onset    Cancer Mother      Lung cancer Mother      Suicide Father       Social History     Tobacco Use    Smoking status: Former     Current packs/day: 0.00     Average packs/day: 0.5 packs/day  for 36.0 years (18.0 ttl pk-yrs)     Types: Cigarettes     Start date: 1982     Quit date: 2018     Years since quittin.3    Smokeless tobacco: Never   Substance Use Topics    Alcohol use: Yes     Alcohol/week: 3.0 standard drinks of alcohol     Types: 3 Glasses of wine per week     Comment: pt states about 3 glasses of wine per week    Drug use: No     Review of Systems   Constitutional:  Negative for fever.   HENT:  Negative for sore throat.    Respiratory:  Negative for shortness of breath.    Cardiovascular:  Negative for chest pain.   Gastrointestinal:  Negative for abdominal pain, diarrhea, nausea and vomiting.   Genitourinary:  Negative for dysuria.   Musculoskeletal:  Negative for back pain.   Skin:  Negative for rash.   Neurological:  Positive for numbness. Negative for dizziness, facial asymmetry, speech difficulty, light-headedness and headaches.   Hematological:  Does not bruise/bleed easily.   Psychiatric/Behavioral:  The patient is not nervous/anxious.        Physical Exam     Initial Vitals [24 1643]   BP Pulse Resp Temp SpO2   (!) 196/75 79 20 98.2 °F (36.8 °C) 96 %      MAP       --         Physical Exam    Nursing note and vitals reviewed.  Constitutional: She appears well-developed and well-nourished.   HENT:   Head: Normocephalic and atraumatic.   Nose: Nose normal.   Mouth/Throat: Oropharynx is clear and moist.   Eyes: Conjunctivae and EOM are normal. Pupils are equal, round, and reactive to light.   Neck: Neck supple. No thyromegaly present. No tracheal deviation present.   Normal range of motion.  Cardiovascular:  Normal rate, regular rhythm, normal heart sounds and intact distal pulses.     Exam reveals no gallop and no friction rub.       No murmur heard.  Pulmonary/Chest: No stridor. No respiratory distress.   Course bilateral breath sounds no adventitious sounds   Abdominal: Abdomen is soft. Bowel sounds are normal. She exhibits no mass. There is no rebound and no  guarding.   Musculoskeletal:         General: No edema. Normal range of motion.      Cervical back: Normal range of motion and neck supple.      Comments: Positive carpopedal spasm noted to left extremity with blood pressure cuff inflation.     Lymphadenopathy:     She has no cervical adenopathy.   Neurological: She is alert and oriented to person, place, and time. She has normal strength and normal reflexes. She displays normal reflexes. No cranial nerve deficit or sensory deficit. GCS score is 15. GCS eye subscore is 4. GCS verbal subscore is 5. GCS motor subscore is 6.   Skin: Skin is warm and dry. Capillary refill takes less than 2 seconds.   Psychiatric: She has a normal mood and affect.         ED Course   Procedures  Labs Reviewed   CBC W/ AUTO DIFFERENTIAL - Abnormal; Notable for the following components:       Result Value    RDW 18.8 (*)     Immature Granulocytes 0.6 (*)     Lymph % 16.0 (*)     Platelet Estimate Clumped (*)     All other components within normal limits   COMPREHENSIVE METABOLIC PANEL - Abnormal; Notable for the following components:    Potassium 2.8 (*)     Calcium 8.1 (*)      (*)     ALT 91 (*)     All other components within normal limits    Narrative:     Potassium and Magnesium critical result(s) called and verbal readback   obtained from Jose Oconnell by [facility] 05/02/2024 18:34   MAGNESIUM - Abnormal; Notable for the following components:    Magnesium <0.7 (*)     All other components within normal limits    Narrative:     Potassium and Magnesium critical result(s) called and verbal readback   obtained from Jose Oconnell by [facility] 05/02/2024 18:34   TROPONIN I   TROPONIN I   B-TYPE NATRIURETIC PEPTIDE   TSH   PHOSPHORUS   URINALYSIS, REFLEX TO URINE CULTURE   HEPATITIS PANEL, ACUTE   LIPASE          Imaging Results              CT Abdomen Pelvis  Without Contrast (In process)                      CT Head Without Contrast (Final result)  Result time 05/02/24 17:44:12      Final  result by Ethan Arnett MD (05/02/24 17:44:12)                   Impression:      1. There is no acute abnormality.      Electronically signed by: Ethan Arnett MD  Date:    05/02/2024  Time:    17:44               Narrative:    EXAMINATION:  CT HEAD WITHOUT CONTRAST    CLINICAL HISTORY:  Upper lower extremity paresthesia;    TECHNIQUE:  Routine unenhanced axial images were obtained through the head.  Sagittal and coronal reformatted images were created.  The study is reviewed in bone and soft tissue windows.    COMPARISON:  None    FINDINGS:  Intracranial contents: There is no acute abnormality.  There is no hemorrhage or mass.  Brain volume, ventricular size and position are normal for age.  The gray-white interface is preserved without acute infarction.  There is no abnormal extra-axial fluid collection.  There is no significant white matter change.  The basilar cisterns are open.  Cerebellar tonsils are normal position.  The orbits are grossly normal.    Extracranial contents, calvarium, soft tissues: The paranasal sinuses and mastoid air cells are grossly clear.  The calvarium is normal.                                       X-Ray Chest AP Portable (Final result)  Result time 05/02/24 17:31:56      Final result by Judy Borrero MD (05/02/24 17:31:56)                   Impression:      No acute cardiopulmonary abnormality appreciated radiographically.      Electronically signed by: John Paul Borrero MD  Date:    05/02/2024  Time:    17:31               Narrative:    EXAMINATION:  XR CHEST AP PORTABLE    CLINICAL HISTORY:  Chest Pain;    TECHNIQUE:  A single portable semi-upright AP chest radiograph was acquired.    COMPARISON:  None    FINDINGS:  The cardiomediastinal silhouette is normal in appearance.  There is atherosclerotic calcification present within the thoracic aortic arch.  No pulmonary vascular congestion appreciated. No airspace consolidation or pulmonary mass. No significant volume of  pleural fluid or pneumothorax. The bones are unremarkable for age.                                       Medications   magnesium sulfate 2g in water 50mL IVPB (premix) (2 g Intravenous New Bag 5/2/24 2004)   amLODIPine tablet 5 mg (has no administration in time range)   atorvastatin tablet 80 mg (has no administration in time range)   metoprolol succinate (TOPROL-XL) 24 hr tablet 25 mg (has no administration in time range)   nicotine 21 mg/24 hr 1 patch (has no administration in time range)   pantoprazole EC tablet 40 mg (has no administration in time range)   sodium chloride 0.9% flush 10 mL (has no administration in time range)   naloxone 0.4 mg/mL injection 0.02 mg (has no administration in time range)   glucose chewable tablet 16 g (has no administration in time range)   glucose chewable tablet 24 g (has no administration in time range)   glucagon (human recombinant) injection 1 mg (has no administration in time range)   enoxaparin injection 40 mg (has no administration in time range)   acetaminophen tablet 650 mg (has no administration in time range)   ondansetron injection 4 mg (has no administration in time range)   albuterol-ipratropium 2.5 mg-0.5 mg/3 mL nebulizer solution 3 mL (has no administration in time range)   melatonin tablet 6 mg (has no administration in time range)   simethicone chewable tablet 80 mg (has no administration in time range)   aluminum-magnesium hydroxide-simethicone 200-200-20 mg/5 mL suspension 30 mL (has no administration in time range)   potassium bicarbonate disintegrating tablet 50 mEq (has no administration in time range)   potassium bicarbonate disintegrating tablet 35 mEq (has no administration in time range)   potassium bicarbonate disintegrating tablet 60 mEq (has no administration in time range)   magnesium oxide tablet 800 mg (has no administration in time range)   magnesium oxide tablet 800 mg (has no administration in time range)   potassium, sodium phosphates  280-160-250 mg packet 2 packet (has no administration in time range)   potassium, sodium phosphates 280-160-250 mg packet 2 packet (has no administration in time range)   potassium, sodium phosphates 280-160-250 mg packet 2 packet (has no administration in time range)   dextrose 10% bolus 125 mL 125 mL (has no administration in time range)   dextrose 10% bolus 250 mL 250 mL (has no administration in time range)   magnesium sulfate 2g in water 50mL IVPB (premix) (has no administration in time range)   potassium chloride 10 mEq in 100 mL IVPB (has no administration in time range)   calcium carbonate 200 mg calcium (500 mg) chewable tablet 1,000 mg (has no administration in time range)   hydrOXYzine pamoate capsule 25 mg (has no administration in time range)   hydrALAZINE injection 5 mg (5 mg Intravenous Given 5/2/24 1734)   potassium bicarbonate disintegrating tablet 50 mEq (50 mEq Oral Given 5/2/24 1958)   potassium chloride 10 mEq in 100 mL IVPB (10 mEq Intravenous New Bag 5/2/24 2000)   calcium gluconate 1 g in NS IVPB (premixed) (0 g Intravenous Stopped 5/2/24 2106)     Medical Decision Making  Amount and/or Complexity of Data Reviewed  Labs: ordered.  Radiology: ordered.    Risk  Prescription drug management.  Decision regarding hospitalization.              Attending Attestation:         Attending Critical Care:   Critical Care Times:   Direct Patient Care (initial evaluation, reassessments, and time considering the case)................................................................30 minutes.   Additional History from reviewing old medical records or taking additional history from the family, EMS, PCP, etc.......................5 minutes.   Ordering, Reviewing, and Interpreting Diagnostic Studies...............................................................................................................5 minutes.    Documentation..................................................................................................................................................................................5 minutes.   Consultation with other Physicians. .................................................................................................................................................5 minutes.   Consultation with the patient's family directly relating to the patient's condition, care, and DNR status (when patient unable)......5 minutes.   Other..................................................................................................................................................................................................5 minutes.   ==============================================================  Total Critical Care Time - exclusive of procedural time: 60 minutes.  ==============================================================  Critical care was necessary to treat or prevent imminent or life-threatening deterioration of the following conditions: metabolic crisis.   Critical care was time spent personally by me on the following activities: obtaining history from patient or relative, review of old charts, examination of patient, review of x-rays / CT sent with the patient, ordering lab, x-rays, and/or EKG, development of treatment plan with patient or relative, ordering and performing treatments and interventions, evaluation of patient's response to treatment, discussions with primary provider, discussion with consultants and re-evaluation of patient's conition.   Critical Care Condition: potentially life-threatening           ED Course as of 05/02/24 2116 Thu May 02, 2024   2104 Patient seen evaluated emergency department.  Patient here with complaint paresthesias to face and bilateral upper and lower extremities.  Patient on workup found to have carpopedal spasm.  Otherwise nonfocal neurologic  examination with NIH-1.  Patient did undergo CT brain noncontrast study which showed no evidence of acute intracranial pathology including stroke or hemorrhage.  Patient did have multiple electrolyte abnormalities including potassium of 2.8, magnesium 0.7, calcium 8.1.  With corrected for albumin found to be 9.  At this time patient did receive emergency department 2 g magnesium, 1 g of calcium gluconate as well as 50 mEq of potassium and 10 mEq potassium IV.  Patient will be admitted for symptomatic hypocalcemia, hypomagnesemia, hypokalemia.  Will need repeat chemistry and further electrolyte adjustments as needed.  Patient remained hemodynamically adequate.  Currently awaiting admission to hospital medicine services.  H&H found to be 12 and 37 white count 6000 platelets 283.  [RM]      ED Course User Index  [RM] Lázaro Moran MD               Medical Decision Making:   Initial Assessment:   58-year-old female with history of gastroesophageal reflux, hypertension, hyperlipidemia, hard of hearing, coronary artery disease history of angioplasty has been off her blood pressure medicines for several months.  Has no primary care provider.  Patient presents emergency department with complaint of paresthesia to the mouth bilateral upper extremities and lower extremities.  Patient states symptoms began a proximally at 12 noon.  Patient denies any blurred vision, no difficulty with speech or swallowing.  No muscle weakness to upper lower extremities.  Patient does state that she has been experiencing spasms to her right hand and left hand as well.  Emergency department patient found with carpopedal spasm to left upper extremity with blood pressure cuff inflation.  She denies any other focal deficits.  Denies any headache, no blurred vision, no chest pain or shortness of breath.  Denies any other constitutional symptoms.  Patient states has not had any nausea, vomiting, no diarrhea.  She does endorse decreased p.o.  intake over last several days as well however.    Differential Diagnosis:   Hypomagnesemia, hypocalcemia, electrolyte abnormalities, dehydration,  Clinical Tests:   Lab Tests: Ordered and Reviewed  Radiological Study: Ordered and Reviewed  Medical Tests: Ordered and Reviewed             Clinical Impression:  Final diagnoses:  [R20.2] Paresthesias (Primary)  [E83.42] Hypomagnesemia  [E83.51] Hypocalcemia  [E87.6] Hypokalemia          ED Disposition Condition    Observation                 Lázaro Moran MD  05/02/24 1936       Lázaro Moran MD  05/02/24 2114

## 2024-05-03 VITALS
RESPIRATION RATE: 18 BRPM | OXYGEN SATURATION: 93 % | TEMPERATURE: 98 F | SYSTOLIC BLOOD PRESSURE: 120 MMHG | HEART RATE: 76 BPM | WEIGHT: 112.44 LBS | HEIGHT: 60 IN | BODY MASS INDEX: 22.07 KG/M2 | DIASTOLIC BLOOD PRESSURE: 72 MMHG

## 2024-05-03 LAB
ALBUMIN SERPL BCP-MCNC: 3.2 G/DL (ref 3.5–5.2)
ALP SERPL-CCNC: 91 U/L (ref 55–135)
ALT SERPL W/O P-5'-P-CCNC: 70 U/L (ref 10–44)
ANION GAP SERPL CALC-SCNC: 12 MMOL/L (ref 8–16)
AST SERPL-CCNC: 167 U/L (ref 10–40)
BASOPHILS # BLD AUTO: 0.09 K/UL (ref 0–0.2)
BASOPHILS NFR BLD: 1.3 % (ref 0–1.9)
BILIRUB SERPL-MCNC: 0.7 MG/DL (ref 0.1–1)
BUN SERPL-MCNC: 4 MG/DL (ref 6–20)
CALCIUM SERPL-MCNC: 8.4 MG/DL (ref 8.7–10.5)
CHLORIDE SERPL-SCNC: 100 MMOL/L (ref 95–110)
CHOLEST SERPL-MCNC: 428 MG/DL (ref 120–199)
CHOLEST/HDLC SERPL: 13.8 {RATIO} (ref 2–5)
CO2 SERPL-SCNC: 24 MMOL/L (ref 23–29)
CREAT SERPL-MCNC: 0.5 MG/DL (ref 0.5–1.4)
DIFFERENTIAL METHOD BLD: ABNORMAL
EOSINOPHIL # BLD AUTO: 0.2 K/UL (ref 0–0.5)
EOSINOPHIL NFR BLD: 2.7 % (ref 0–8)
ERYTHROCYTE [DISTWIDTH] IN BLOOD BY AUTOMATED COUNT: 18.5 % (ref 11.5–14.5)
EST. GFR  (NO RACE VARIABLE): >60 ML/MIN/1.73 M^2
GLUCOSE SERPL-MCNC: 107 MG/DL (ref 70–110)
HCT VFR BLD AUTO: 36 % (ref 37–48.5)
HDLC SERPL-MCNC: 31 MG/DL (ref 40–75)
HDLC SERPL: 7.2 % (ref 20–50)
HGB BLD-MCNC: 12.1 G/DL (ref 12–16)
IMM GRANULOCYTES # BLD AUTO: 0.02 K/UL (ref 0–0.04)
IMM GRANULOCYTES NFR BLD AUTO: 0.3 % (ref 0–0.5)
LDLC SERPL CALC-MCNC: 342 MG/DL (ref 63–159)
LYMPHOCYTES # BLD AUTO: 1.2 K/UL (ref 1–4.8)
LYMPHOCYTES NFR BLD: 17.4 % (ref 18–48)
MAGNESIUM SERPL-MCNC: 3.3 MG/DL (ref 1.6–2.6)
MCH RBC QN AUTO: 28.2 PG (ref 27–31)
MCHC RBC AUTO-ENTMCNC: 33.6 G/DL (ref 32–36)
MCV RBC AUTO: 84 FL (ref 82–98)
MONOCYTES # BLD AUTO: 0.6 K/UL (ref 0.3–1)
MONOCYTES NFR BLD: 9 % (ref 4–15)
NEUTROPHILS # BLD AUTO: 4.9 K/UL (ref 1.8–7.7)
NEUTROPHILS NFR BLD: 69.3 % (ref 38–73)
NONHDLC SERPL-MCNC: 397 MG/DL
NRBC BLD-RTO: 0 /100 WBC
PLATELET # BLD AUTO: 331 K/UL (ref 150–450)
PMV BLD AUTO: 11.9 FL (ref 9.2–12.9)
POTASSIUM SERPL-SCNC: 3.4 MMOL/L (ref 3.5–5.1)
PROT SERPL-MCNC: 7 G/DL (ref 6–8.4)
RBC # BLD AUTO: 4.29 M/UL (ref 4–5.4)
SODIUM SERPL-SCNC: 136 MMOL/L (ref 136–145)
TRIGL SERPL-MCNC: 275 MG/DL (ref 30–150)
TROPONIN I SERPL DL<=0.01 NG/ML-MCNC: 0.01 NG/ML (ref 0–0.03)
WBC # BLD AUTO: 7.11 K/UL (ref 3.9–12.7)

## 2024-05-03 PROCEDURE — 83735 ASSAY OF MAGNESIUM: CPT

## 2024-05-03 PROCEDURE — 97165 OT EVAL LOW COMPLEX 30 MIN: CPT

## 2024-05-03 PROCEDURE — 25000003 PHARM REV CODE 250

## 2024-05-03 PROCEDURE — 96366 THER/PROPH/DIAG IV INF ADDON: CPT

## 2024-05-03 PROCEDURE — S4991 NICOTINE PATCH NONLEGEND: HCPCS

## 2024-05-03 PROCEDURE — 94761 N-INVAS EAR/PLS OXIMETRY MLT: CPT

## 2024-05-03 PROCEDURE — 84484 ASSAY OF TROPONIN QUANT: CPT

## 2024-05-03 PROCEDURE — G0378 HOSPITAL OBSERVATION PER HR: HCPCS

## 2024-05-03 PROCEDURE — 36415 COLL VENOUS BLD VENIPUNCTURE: CPT | Mod: XB

## 2024-05-03 PROCEDURE — 97161 PT EVAL LOW COMPLEX 20 MIN: CPT

## 2024-05-03 PROCEDURE — 63600175 PHARM REV CODE 636 W HCPCS

## 2024-05-03 PROCEDURE — 85025 COMPLETE CBC W/AUTO DIFF WBC: CPT

## 2024-05-03 PROCEDURE — 80061 LIPID PANEL: CPT

## 2024-05-03 PROCEDURE — 80053 COMPREHEN METABOLIC PANEL: CPT

## 2024-05-03 RX ORDER — CALCIUM CARBONATE 300MG(750)
400 TABLET,CHEWABLE ORAL DAILY
Qty: 7 TABLET | Refills: 0 | Status: SHIPPED | OUTPATIENT
Start: 2024-05-03 | End: 2024-05-10

## 2024-05-03 RX ORDER — METOPROLOL SUCCINATE 25 MG/1
TABLET, EXTENDED RELEASE ORAL
Qty: 7 TABLET | Refills: 0 | Status: SHIPPED | OUTPATIENT
Start: 2024-05-03 | End: 2024-05-13 | Stop reason: SDUPTHER

## 2024-05-03 RX ORDER — AMLODIPINE BESYLATE 5 MG/1
TABLET ORAL
Qty: 7 TABLET | Refills: 0 | Status: SHIPPED | OUTPATIENT
Start: 2024-05-03 | End: 2024-05-13 | Stop reason: SDUPTHER

## 2024-05-03 RX ORDER — POTASSIUM CHLORIDE 20 MEQ/1
20 TABLET, EXTENDED RELEASE ORAL DAILY
Qty: 7 TABLET | Refills: 0 | Status: SHIPPED | OUTPATIENT
Start: 2024-05-03 | End: 2024-05-10

## 2024-05-03 RX ORDER — ATORVASTATIN CALCIUM 80 MG/1
TABLET, FILM COATED ORAL
Qty: 7 TABLET | Refills: 0 | Status: SHIPPED | OUTPATIENT
Start: 2024-05-03 | End: 2024-05-13 | Stop reason: SDUPTHER

## 2024-05-03 RX ADMIN — POTASSIUM BICARBONATE 35 MEQ: 391 TABLET, EFFERVESCENT ORAL at 06:05

## 2024-05-03 RX ADMIN — Medication 1 PATCH: at 08:05

## 2024-05-03 RX ADMIN — MAGNESIUM SULFATE HEPTAHYDRATE 2 G: 40 INJECTION, SOLUTION INTRAVENOUS at 01:05

## 2024-05-03 RX ADMIN — PANTOPRAZOLE SODIUM 40 MG: 40 TABLET, DELAYED RELEASE ORAL at 08:05

## 2024-05-03 RX ADMIN — POTASSIUM BICARBONATE 35 MEQ: 391 TABLET, EFFERVESCENT ORAL at 08:05

## 2024-05-03 RX ADMIN — AMLODIPINE BESYLATE 5 MG: 5 TABLET ORAL at 08:05

## 2024-05-03 RX ADMIN — METOPROLOL SUCCINATE 25 MG: 25 TABLET, EXTENDED RELEASE ORAL at 08:05

## 2024-05-03 RX ADMIN — CALCIUM CARBONATE (ANTACID) CHEW TAB 500 MG 1000 MG: 500 CHEW TAB at 08:05

## 2024-05-03 NOTE — ASSESSMENT & PLAN NOTE
Patient has Abnormal Magnesium: hypomagnesemia. Will continue to monitor electrolytes closely. Will replace the affected electrolytes and repeat labs to be done after interventions completed. The patient's magnesium results have been reviewed and are listed below.  Recent Labs   Lab 05/02/24  1710   MG <0.7*

## 2024-05-03 NOTE — DISCHARGE SUMMARY
"[facility] Medicine  Discharge Summary      Patient Name: Dena Stanton  MRN: [id number]  DICK: [id number]  Patient Class: OP- Observation  Admission Date: 5/2/2024  Hospital Length of Stay: 0 days  Discharge Date and Time: 5/3/2024  1:37 PM  Attending Physician: Charis att. providers found   Discharging Provider: Raegan Nazario MD  Primary Care Provider: Charis, Primary Doctor    Primary Care Team: Networked reference to record PCT     HPI:   Dena Stanton is a 58 year old female with a previous medical history of CAD, GERD, HLD, HTN, and remote MI who presents for parathesias in the bilateral arms, legs, face and neck. Patient endorses her symptoms began on 5/1/24 and were localized to bilateral hands which were numb and also having intermittent spasms. She attributed the symptoms to arthritis and used a cream she has at home. On 5/2/24 at noon the patient endorses she has numbness and tingling to bilateral arms, bilateral legs, around her mouth and her entire neck that persisted. She reports she is non-compliant with her medications due to having a very stressful year and is dealing with immense grief over losing multiple family members but also does not like to follow-up with doctors for fear of finding "something wrong". Patient endorses she has had a decreased appetite and does not eat regularly. She also endorses abdominal bloating and distension. She smokes daily and drinks approximately 1-2 daiquiris a week. Initial ED workup showed a negative CT of her head. CBC showed no signs of infection or anemia. CMP showed elevated liver enzymes along with a calcium of 8.1 and  potassium of 2.8. Magnesium was 0.7 and 2 grams of IV magnesium were administered in ED along with 10meq of K IV and 50 MEQ PO. 1 gram of calcium gluconate was administered also. Patient endorses that the paresthesias in face and arms have subsided. Patient to be admitted by hospital medicine for further evaluation and " management    * No surgery found *      Hospital Course:   Patient was admitted to the hospital medicine service.  Her potassium and magnesium were repleted.  Paresthesias resolved.  She felt well.  She worked with PT and OT.  She requested discharge home as her son was graduating the next day.  She was discharged home with daily magnesium and potassium supplements for the next week and will follow-up with new PCP on May 9th for recheck of labs and establishing care.  Patient expressed understanding of discharge plan.  She will follow up with new PCP     Goals of Care Treatment Preferences:  Code Status: Full Code      Consults:     No new Assessment & Plan notes have been filed under this hospital service since the last note was generated.  Service: Hospital Medicine    Final Active Diagnoses:    Diagnosis Date Noted POA    PRINCIPAL PROBLEM:  Paresthesias [R20.2] 05/02/2024 Yes    Tobacco dependence due to cigarettes [F17.210] 05/02/2024 Yes    Hypomagnesemia [E83.42] 05/02/2024 Yes    Hypokalemia [E87.6] 05/02/2024 Yes    Transaminitis [R74.01] 05/02/2024 Yes    Hypertension [I10] 10/02/2017 Yes      Problems Resolved During this Admission:       Discharged Condition: good    Disposition: Home or Self Care    Follow Up:   Follow-up Information       Ashley Rudolph NP. Go on 5/9/2024.    Specialty: Family Medicine  Why: at  1PM for hospital follow up/est care  Contact information:  [address] [place] [id number]  [phone]                           Patient Instructions:      Basic Metabolic Panel   Standing Status: Future Standing Exp. Date: 08/01/25     Magnesium   Standing Status: Future Standing Exp. Date: 08/01/25     Notify your health care provider if you experience any of the following:  temperature >100.4     Notify your health care provider if you experience any of the following:  persistent nausea and vomiting or diarrhea     Notify your health care provider if you  experience any of the following:  redness, tenderness, or signs of infection (pain, swelling, redness, odor or green/yellow discharge around incision site)     Notify your health care provider if you experience any of the following:  persistent dizziness, light-headedness, or visual disturbances     Notify your health care provider if you experience any of the following:  increased confusion or weakness     Activity as tolerated       Significant Diagnostic Studies: Labs: BMP:   Recent Labs   Lab 05/02/24  1710 05/03/24  0352    107    136   K 2.8* 3.4*   CL 97 100   CO2 26 24   BUN 6 4*   CREATININE 0.6 0.5   CALCIUM 8.1* 8.4*   MG <0.7* 3.3*    and CBC   Recent Labs   Lab 05/02/24 1710 05/03/24  0352   WBC 6.70 7.11   HGB 12.5 12.1   HCT 37.6 36.0*    331       Pending Diagnostic Studies:       Procedure Component Value Units Date/Time    Hepatitis Panel, Acute [[id number]] Collected: 05/02/24 2229    Order Status: Sent Lab Status: In process Updated: 05/02/24 2240    Specimen: Blood            Medications:  Reconciled Home Medications:      Medication List        START taking these medications      magnesium oxide 400 mg magnesium Tab  Take 400 mg by mouth once daily. for 7 days     potassium chloride SA 20 MEQ tablet  Commonly known as: K-DUR,KLOR-CON  Take 1 tablet (20 mEq total) by mouth once daily. for 7 days            CONTINUE taking these medications      amLODIPine 5 MG tablet  Commonly known as: NORVASC  Take one tablet by mouth once a day.     ARTHRITIS STRENGTH BC POWDER ORAL  Take 1 tablet by mouth once daily.     atorvastatin 80 MG tablet  Commonly known as: LIPITOR  Take one tablet by mouth every pm.     buPROPion 150 MG TBSR 12 hr tablet  Commonly known as: WELLBUTRIN SR  Take 1 tablet (150 mg total) by mouth 2 (two) times daily.     metoprolol succinate 25 MG 24 hr tablet  Commonly known as: TOPROL-XL  TAKE 1 TABLET DAILY     omeprazole 20 MG capsule  Commonly known as:  PRILOSEC  Take 20 mg by mouth once daily.            STOP taking these medications      nicotine 21 mg/24 hr  Commonly known as: NICODERM CQ              Indwelling Lines/Drains at time of discharge:   Lines/Drains/Airways       None                   Time spent on the discharge of patient: 35 minutes         Raegan Nazario MD  Department of Hospital Medicine  [facility]/Surg

## 2024-05-03 NOTE — PLAN OF CARE
Apt scheduled with [facility] per pt request. Apt added to AVS       05/03/24 7730   Post-Acute Status   Hospital Resources/Appts/Education Provided Appointments scheduled and added to AVS

## 2024-05-03 NOTE — HOSPITAL COURSE
Patient was admitted to the hospital medicine service.  Her potassium and magnesium were repleted.  Paresthesias resolved.  She felt well.  She worked with PT and OT.  She requested discharge home as her son was graduating the next day.  She was discharged home with daily magnesium and potassium supplements for the next week and will follow-up with new PCP on May 9th for recheck of labs and establishing care.  Patient expressed understanding of discharge plan.  She will follow up with new PCP

## 2024-05-03 NOTE — DISCHARGE INSTRUCTIONS
Follow-up with PCP as scheduled for follow-up and establish care, get repeat labs drawn morning of 5/9

## 2024-05-03 NOTE — HPI
"Dena Stanton is a 58 year old female with a previous medical history of CAD, GERD, HLD, HTN, and remote MI who presents for parathesias in the bilateral arms, legs, face and neck. Patient endorses her symptoms began on 5/1/24 and were localized to bilateral hands which were numb and also having intermittent spasms. She attributed the symptoms to arthritis and used a cream she has at home. On 5/2/24 at noon the patient endorses she has numbness and tingling to bilateral arms, bilateral legs, around her mouth and her entire neck that persisted. She reports she is non-compliant with her medications due to having a very stressful year and is dealing with immense grief over losing multiple family members but also does not like to follow-up with doctors for fear of finding "something wrong". Patient endorses she has had a decreased appetite and does not eat regularly. She also endorses abdominal bloating and distension. She smokes daily and drinks approximately 1-2 daiquiris a week. Initial ED workup showed a negative CT of her head. CBC showed no signs of infection or anemia. CMP showed elevated liver enzymes along with a calcium of 8.1 and  potassium of 2.8. Magnesium was 0.7 and 2 grams of IV magnesium were administered in ED along with 10meq of K IV and 50 MEQ PO. 1 gram of calcium gluconate was administered also. Patient endorses that the paresthesias in face and arms have subsided. Patient to be admitted by hospital medicine for further evaluation and management  "

## 2024-05-03 NOTE — PLAN OF CARE
Problem: Adult Inpatient Plan of Care  Goal: Plan of Care Review  5/3/2024 0618 by Radha Bonner LPN  Outcome: Progressing  5/3/2024 0617 by Radha Bonner LPN  Outcome: Progressing  Goal: Patient-Specific Goal (Individualized)  5/3/2024 0618 by Radha Bonner LPN  Outcome: Progressing  5/3/2024 0617 by Radha Bonner LPN  Outcome: Progressing  Goal: Absence of Hospital-Acquired Illness or Injury  5/3/2024 0618 by Radha Bonner LPN  Outcome: Progressing  5/3/2024 0617 by Radha Bonner LPN  Outcome: Progressing  Goal: Optimal Comfort and Wellbeing  5/3/2024 0618 by Radha Bonner LPN  Outcome: Progressing  5/3/2024 0617 by Radha Bonner LPN  Outcome: Progressing  Goal: Readiness for Transition of Care  5/3/2024 0618 by Radha Bonner LPN  Outcome: Progressing  5/3/2024 0617 by Radha Bonner LPN  Outcome: Progressing     Problem: Fall Injury Risk  Goal: Absence of Fall and Fall-Related Injury  Outcome: Progressing   Plan of care reviewed with patient,verbalized understanding.  Electrolyte replacements given as per orders. SR  on telemetry. Remains free from falls, injury. Instructed to call for assistance as needed ,verbalized understanding. Bed in low & locked position. Call light in reach, bed alarm on .

## 2024-05-03 NOTE — NURSING
IV and tele removed. Orders reviewed. Prescriptions sent to pharmacy. Pt ride here. Pt  escorted to car via w/c. Pt d/c home.

## 2024-05-03 NOTE — ASSESSMENT & PLAN NOTE
Patient reports abdominal bloating and distension accompanied by anorexia.    -Lipid panel in AM  -Ammonia level pending  -Hepatitis panel pending  -CT abdomen/pelvis

## 2024-05-03 NOTE — SUBJECTIVE & OBJECTIVE
Past Medical History:   Diagnosis Date    Coronary artery disease     GERD (gastroesophageal reflux disease)     Hearing impaired person, bilateral     Hyperlipidemia     Hypertension     Myocardial infarction        Past Surgical History:   Procedure Laterality Date     SECTION      TYMPANOPLASTY         Review of patient's allergies indicates:   Allergen Reactions    Chantix [varenicline] Other (See Comments)     Mood changes       Current Facility-Administered Medications   Medication Dose Route Frequency Provider Last Rate Last Admin    acetaminophen tablet 650 mg  650 mg Oral Q4H PRN Meaghan Shelby, NP        albuterol-ipratropium 2.5 mg-0.5 mg/3 mL nebulizer solution 3 mL  3 mL Nebulization Q6H PRN Meaghan Shelby, NP        aluminum-magnesium hydroxide-simethicone 200-200-20 mg/5 mL suspension 30 mL  30 mL Oral QID PRN Meaghan Shelby, ZEINAB        amLODIPine tablet 5 mg  5 mg Oral Daily Meaghan Shelby, NP        atorvastatin tablet 80 mg  80 mg Oral QHS Meaghan Shelby, NP        calcium carbonate 200 mg calcium (500 mg) chewable tablet 1,000 mg  1,000 mg Oral TID Meaghan Shelby, NP        dextrose 10% bolus 125 mL 125 mL  12.5 g Intravenous PRN Meaghan Shelby, NP        dextrose 10% bolus 250 mL 250 mL  25 g Intravenous PRN Meaghan Shelby, NP        enoxaparin injection 40 mg  40 mg Subcutaneous Daily Meaghan Shelby, NP        glucagon (human recombinant) injection 1 mg  1 mg Intramuscular PRN Meaghan Shelby, NP        glucose chewable tablet 16 g  16 g Oral PRN Meaghan Shelby, NP        glucose chewable tablet 24 g  24 g Oral PRN Meaghan Shelby, NP        hydrOXYzine pamoate capsule 25 mg  25 mg Oral Nightly PRN Meaghan Shelby, NP        magnesium oxide tablet 800 mg  800 mg Oral PRN Meaghan Shelby, NP        magnesium oxide tablet 800 mg  800 mg Oral PRN Meaghan Shelby, NP        magnesium sulfate 2g in water 50mL IVPB (premix)  2 g Intravenous ED 1 Time  Lázaro Moran MD 25 mL/hr at 05/02/24 2004 2 g at 05/02/24 2004    magnesium sulfate 2g in water 50mL IVPB (premix)  2 g Intravenous Once Meaghan Shelby NP        melatonin tablet 6 mg  6 mg Oral Nightly PRN Meaghan Shelby NP        metoprolol succinate (TOPROL-XL) 24 hr tablet 25 mg  25 mg Oral Daily Meaghan Shelby NP        naloxone 0.4 mg/mL injection 0.02 mg  0.02 mg Intravenous PRN Meaghan Shelby NP        [START ON 5/3/2024] nicotine 21 mg/24 hr 1 patch  1 patch Transdermal Daily Meaghan Shelby NP        ondansetron injection 4 mg  4 mg Intravenous Q8H PRN Meaghan Shelby NP        pantoprazole EC tablet 40 mg  40 mg Oral Daily Meaghan Shelby NP        potassium bicarbonate disintegrating tablet 35 mEq  35 mEq Oral PRN Meaghan Shelby NP        potassium bicarbonate disintegrating tablet 50 mEq  50 mEq Oral PRN Meaghan Shelby NP        potassium bicarbonate disintegrating tablet 60 mEq  60 mEq Oral PRN Meaghan Shelby NP        potassium chloride 10 mEq in 100 mL IVPB  10 mEq Intravenous Once Meaghan Shelby NP        potassium, sodium phosphates 280-160-250 mg packet 2 packet  2 packet Oral PRN Meaghan Shelby NP        potassium, sodium phosphates 280-160-250 mg packet 2 packet  2 packet Oral PRN Meaghan Shelby NP        potassium, sodium phosphates 280-160-250 mg packet 2 packet  2 packet Oral PRN Meaghan Shelby NP        simethicone chewable tablet 80 mg  1 tablet Oral QID PRN Meaghan Shelby NP        sodium chloride 0.9% flush 10 mL  10 mL Intravenous Q12H PRN Meaghan Shelby NP         Current Outpatient Medications   Medication Sig Dispense Refill    ASPIRIN/SALICYLAMIDE/CAFFEINE (ARTHRITIS STRENGTH BC POWDER ORAL) Take 1 tablet by mouth once daily.      omeprazole (PRILOSEC) 20 MG capsule Take 20 mg by mouth once daily.      amLODIPine (NORVASC) 5 MG tablet Take one tablet by mouth once a day. (Patient not taking: Reported on 5/2/2024) 90  tablet 1    atorvastatin (LIPITOR) 80 MG tablet Take one tablet by mouth every pm. (Patient not taking: Reported on 2024) 90 tablet 1    buPROPion (WELLBUTRIN SR) 150 MG TBSR 12 hr tablet Take 1 tablet (150 mg total) by mouth 2 (two) times daily. (Patient not taking: Reported on 2024) 60 tablet 0    metoprolol succinate (TOPROL-XL) 25 MG 24 hr tablet TAKE 1 TABLET DAILY (Patient not taking: Reported on 2024) 90 tablet 1    nicotine (NICODERM CQ) 21 mg/24 hr Place 1 patch onto the skin once daily. (Patient not taking: Reported on 2024) 28 patch 0     Family History       Problem Relation (Age of Onset)    Cancer Mother    Lung cancer Mother    Suicide Father          Tobacco Use    Smoking status: Former     Current packs/day: 0.00     Average packs/day: 0.5 packs/day for 36.0 years (18.0 ttl pk-yrs)     Types: Cigarettes     Start date: 1982     Quit date: 2018     Years since quittin.3    Smokeless tobacco: Never   Substance and Sexual Activity    Alcohol use: Yes     Alcohol/week: 3.0 standard drinks of alcohol     Types: 3 Glasses of wine per week     Comment: pt states about 3 glasses of wine per week    Drug use: No    Sexual activity: Yes     Partners: Male     Comment: long term     Review of Systems   Constitutional:  Positive for appetite change and fatigue.   Gastrointestinal:  Positive for abdominal distention.   Musculoskeletal:  Positive for arthralgias and myalgias.        Endorses muscle spasms   Neurological:  Positive for numbness.        Intermittent numbness to bilateral arms, legs, face and neck   Psychiatric/Behavioral:  The patient is nervous/anxious.    All other systems reviewed and are negative.    Objective:     Vital Signs (Most Recent):  Temp: 98.2 °F (36.8 °C) (24)  Pulse: 95 (24)  Resp: 20 (24)  BP: (!) 155/77 (24)  SpO2: 98 % (24) Vital Signs (24h Range):  Temp:  [98.2 °F (36.8 °C)] 98.2 °F (36.8  °C)  Pulse:  [79-95] 95  Resp:  [20] 20  SpO2:  [96 %-98 %] 98 %  BP: (155-196)/(72-81) 155/77     Weight: 49.9 kg (110 lb)  Body mass index is 21.48 kg/m².     Physical Exam  Vitals reviewed.   Constitutional:       General: She is not in acute distress.     Appearance: Normal appearance.   HENT:      Head: Normocephalic and atraumatic.      Nose: Nose normal.      Mouth/Throat:      Mouth: Mucous membranes are dry.      Pharynx: Oropharynx is clear.   Eyes:      General: Vision grossly intact. No visual field deficit.     Extraocular Movements: Extraocular movements intact.      Pupils: Pupils are equal, round, and reactive to light.        Comments: Blue circles: xanthomas   Cardiovascular:      Rate and Rhythm: Tachycardia present.      Pulses: Normal pulses.      Heart sounds: Normal heart sounds.   Pulmonary:      Effort: Pulmonary effort is normal.      Breath sounds: Normal breath sounds.   Abdominal:      General: Bowel sounds are normal. There is distension.      Palpations: Abdomen is soft.      Tenderness: There is no abdominal tenderness. There is no guarding.   Musculoskeletal:         General: Normal range of motion.      Cervical back: Normal range of motion and neck supple.   Skin:     General: Skin is warm and dry.      Capillary Refill: Capillary refill takes less than 2 seconds.   Neurological:      General: No focal deficit present.      Mental Status: She is alert and oriented to person, place, and time. Mental status is at baseline.      GCS: GCS eye subscore is 4. GCS verbal subscore is 5. GCS motor subscore is 6.      Cranial Nerves: No cranial nerve deficit, dysarthria or facial asymmetry.      Motor: Motor function is intact. No weakness or tremor.      Coordination: Coordination is intact.      Gait: Gait is intact.      Comments: Endorses paresthesias to bilateral legs   Psychiatric:         Attention and Perception: Attention and perception normal.         Mood and Affect: Mood is  anxious.         Speech: Speech normal.         Behavior: Behavior normal.         Thought Content: Thought content normal.         Judgment: Judgment normal.              CRANIAL NERVES     CN III, IV, VI   Pupils are equal, round, and reactive to light.       Significant Labs: All pertinent labs within the past 24 hours have been reviewed.  CBC:   Recent Labs   Lab 05/02/24  1710   WBC 6.70   HGB 12.5   HCT 37.6        CMP:   Recent Labs   Lab 05/02/24  1710      K 2.8*   CL 97   CO2 26      BUN 6   CREATININE 0.6   CALCIUM 8.1*   PROT 7.8   ALBUMIN 3.5   BILITOT 0.4   ALKPHOS 93   *   ALT 91*   ANIONGAP 15     Cardiac Markers:   Recent Labs   Lab 05/02/24  1710   BNP 35     Magnesium:   Recent Labs   Lab 05/02/24  1710   MG <0.7*     Troponin:   Recent Labs   Lab 05/02/24 1710 05/02/24  1942   TROPONINI <0.006 <0.006     TSH:   Recent Labs   Lab 05/02/24 1710   TSH 2.897       Significant Imaging: I have reviewed all pertinent imaging results/findings within the past 24 hours.  EXAMINATION:  CT HEAD WITHOUT CONTRAST     CLINICAL HISTORY:  Upper lower extremity paresthesia;     TECHNIQUE:  Routine unenhanced axial images were obtained through the head.  Sagittal and coronal reformatted images were created.  The study is reviewed in bone and soft tissue windows.     COMPARISON:  None     FINDINGS:  Intracranial contents: There is no acute abnormality.  There is no hemorrhage or mass.  Brain volume, ventricular size and position are normal for age.  The gray-white interface is preserved without acute infarction.  There is no abnormal extra-axial fluid collection.  There is no significant white matter change.  The basilar cisterns are open.  Cerebellar tonsils are normal position.  The orbits are grossly normal.     Extracranial contents, calvarium, soft tissues: The paranasal sinuses and mastoid air cells are grossly clear.  The calvarium is normal.     Impression:     1. There is no  acute abnormality.        Electronically signed by:Ethan Arnett MD  Date:                                            05/02/2024  Time:                                           17:44    EXAMINATION:  XR CHEST AP PORTABLE     CLINICAL HISTORY:  Chest Pain;     TECHNIQUE:  A single portable semi-upright AP chest radiograph was acquired.     COMPARISON:  None     FINDINGS:  The cardiomediastinal silhouette is normal in appearance.  There is atherosclerotic calcification present within the thoracic aortic arch.  No pulmonary vascular congestion appreciated. No airspace consolidation or pulmonary mass. No significant volume of pleural fluid or pneumothorax. The bones are unremarkable for age.     Impression:     No acute cardiopulmonary abnormality appreciated radiographically.        Electronically signed by:John Paul Borrero MD  Date:                                            05/02/2024  Time:                                           17:31

## 2024-05-03 NOTE — PT/OT/SLP EVAL
Occupational Therapy   Evaluation and Discharge Note    Name: Dena Stanton  MRN: [id number]  Admitting Diagnosis: Paresthesias  Recent Surgery: * No surgery found *      Recommendations:     Discharge Recommendations: No Therapy Indicated  Discharge Equipment Recommendations: none  Barriers to discharge:  None    Assessment:     Dena Stanton is a 58 y.o. female with a medical diagnosis of Paresthesias. At this time, patient is functioning at their prior level of function and does not require further acute OT services.     Plan:     During this hospitalization, patient does not require further acute OT services.  Please re-consult if situation changes.    Plan of Care Reviewed with: patient, spouse    Subjective     Chief Complaint: none  Patient/Family Comments/goals: none    Occupational Profile:  Living Environment: Patient lives with significant other and two adult sons.   Previous level of function: Patient was independent with ADLs and mobility.   Equipment Used at home: none  Assistance upon Discharge: Patient will not require further OT post discharge.    Pain/Comfort:  Pain Rating 1: 0/10  Pain Rating Post-Intervention 1: 0/10    Patients cultural, spiritual, Adventist conflicts given the current situation:      Objective:     Communicated with: nurse Del Angel prior to session.  Patient found HOB elevated with telemetry upon OT entry to room.    General Precautions: Standard, fall  Orthopedic Precautions: N/A  Braces: N/A  Respiratory Status: Room air     Occupational Performance:    Bed Mobility:    Patient completed Scooting/Bridging with independence  Patient completed Supine to Sit with independence  Patient completed Sit to Supine with independence    Functional Mobility/Transfers:  Patient completed Sit <> Stand Transfer with independence  with  no assistive device   Patient completed Toilet Transfer Stand Pivot technique with independence with  no AD    Activities of Daily Living:  Grooming:  independence with oral and facial hygiene standing at sink  Lower Body Dressing: independence to don/doff socks seated EOB  Toileting: independence     Cognitive/Visual Perceptual:  Cognitive/Psychosocial Skills:     -       Oriented to: x4   -       Follows Commands/attention:Follows multistep  commands  -       Communication: clear/fluent  -       Safety awareness/insight to disability: intact   -       Mood/Affect/Coping skills/emotional control: Appropriate to situation and Cooperative  Visual/Perceptual:      -Intact     Physical Exam:  Postural examination/scapula alignment:    -       Rounded shoulders  -       Forward head  Upper Extremity Range of Motion:     -       Right Upper Extremity: WFL  -       Left Upper Extremity: WFL  Upper Extremity Strength:    -       Right Upper Extremity: WFL  -       Left Upper Extremity: WFL   Strength:    -       Right Upper Extremity: WFL  -       Left Upper Extremity: WFL  Fine Motor Coordination:    -       Intact  Gross motor coordination:   WFL    AMPAC 6 Click ADL:  AMPAC Total Score: 24    Treatment & Education:  OT ed pt on OT role & POC as well as discharge recommendations.      Patient left HOB elevated with all lines intact, call button in reach, nurse notified, and significant other present    GOALS:   Multidisciplinary Problems       Occupational Therapy Goals       Not on file                    History:     Past Medical History:   Diagnosis Date    Coronary artery disease     GERD (gastroesophageal reflux disease)     Hearing impaired person, bilateral     Hyperlipidemia     Hypertension     Myocardial infarction          Past Surgical History:   Procedure Laterality Date     SECTION      TYMPANOPLASTY         Time Tracking:     OT Date of Treatment: 24  OT Start Time: 1142  OT Stop Time: 1150  OT Total Time (min): 8 min    Billable Minutes:Evaluation 8    5/3/2024

## 2024-05-03 NOTE — NURSING
Nurses Note -- 4 Eyes      5/3/2024   6:17 AM      Skin assessed during: Admit      [x] No Altered Skin Integrity Present    []Prevention Measures Documented      [] Yes- Altered Skin Integrity Present or Discovered   [] LDA Added if Not in Epic (Describe Wound)   [] New Altered Skin Integrity was Present on Admit and Documented in LDA   [] Wound Image Taken    Wound Care Consulted? No    Attending Nurse:  Radha Olivas RN/Staff Member:   sissy

## 2024-05-03 NOTE — ASSESSMENT & PLAN NOTE
Dangers of cigarette smoking were reviewed with patient in detail. Patient was Counseled for 3-10 minutes. Nicotine replacement options were discussed. Nicotine replacement was discussed- prescribed nicotine patch daily

## 2024-05-03 NOTE — PLAN OF CARE
[facility] - Med/Surg  Initial Discharge Assessment       Primary Care Provider: No, Primary Doctor    Admission Diagnosis: Paresthesias [R20.2]    Admission Date: 5/2/2024  Expected Discharge Date: 5/4/2024    Transition of Care Barriers: None    Payor: [employer] / Plan: [employer] ([employer]) / Product Type: Commercial /     Extended Emergency Contact Information  Primary Emergency Contact: Best Isaacs   [place]  Mobile Phone: [phone]  Relation: Friend    Discharge Plan A: Home with family  Discharge Plan B: Home      [facility] [place] [facility] - [facility] [employer] AT Portal to Registered [facility] Sites  [facility]  [place] [address]  Phone: [phone] Fax: [phone]    [facility] [place] - [address] [address] [facility] [facility] [address]  [address]  [place] [address]  Phone: [phone] Fax: [phone]      DC assessment completed with patient and SO at bedside. Verified information on facesheet as correct. Denies POA. NOK 2 sons. Pt lives at listed address with Best JIMENES, and their 2 sons. Does not have PCP- requesting to get established at [facility]. Pharmacy is [facility] in [place]. Denies hh/hd/dme/blood thinners/outpt services. Independent at baseline. Drives herself to apts. SO will provide transportation home. Denies any prescription medications prior to admit. Denies recent inpt stay in last 30 days. Verified insurance on file. DC plan is home.     Initial Assessment (most recent)       Adult Discharge Assessment - 05/03/24 0917          Discharge Assessment    Assessment Type Discharge Planning Assessment     Confirmed/corrected address, phone number and insurance Yes     Confirmed Demographics Correct on Facesheet     Source of Information patient     If unable to respond/provide information was family/caregiver contacted? Yes     Does patient/caregiver understand observation status  Yes     Communicated TAMIR with patient/caregiver Yes     Reason For Admission paresthesias     People in Home significant other;child(mervat), adult     Facility Arrived From: home     Do you expect to return to your current living situation? Yes     Do you have help at home or someone to help you manage your care at home? Yes     Prior to hospitilization cognitive status: Alert/Oriented     Current cognitive status: Alert/Oriented     Walking or Climbing Stairs Difficulty no     Dressing/Bathing Difficulty no     Equipment Currently Used at Home none     Readmission within 30 days? No     Patient currently being followed by outpatient case management? No     Do you currently have service(s) that help you manage your care at home? No     Do you take prescription medications? No     Do you have prescription coverage? Yes     Do you have any problems affording any of your prescribed medications? No     Is the patient taking medications as prescribed? yes     Who is going to help you get home at discharge? SO     How do you get to doctors appointments? car, drives self     Are you on dialysis? No     Do you take coumadin? No     Discharge Plan A Home with family     Discharge Plan B Home     DME Needed Upon Discharge  none     Discharge Plan discussed with: Patient;Spouse/sig other     Transition of Care Barriers None

## 2024-05-03 NOTE — H&P
"[facility]  History & Physical    Patient Name: Dena Stanton  MRN: [id number]  Patient Class: OP- Observation  Admission Date: 5/2/2024  Attending Physician: Raegan Nazario MD   Primary Care Provider: Charis Primary Doctor         Patient information was obtained from patient, past medical records, and ER records.     Subjective:     Principal Problem:Paresthesias    Chief Complaint:   Chief Complaint   Patient presents with    Numbness     To bilateral arms, legs and face x several hours; recent arthritis attack         HPI: Dena Stanton is a 58 year old female with a previous medical history of CAD, GERD, HLD, HTN, and remote MI who presents for parathesias in the bilateral arms, legs, face and neck. Patient endorses her symptoms began on 5/1/24 and were localized to bilateral hands which were numb and also having intermittent spasms. She attributed the symptoms to arthritis and used a cream she has at home. On 5/2/24 at noon the patient endorses she has numbness and tingling to bilateral arms, bilateral legs, around her mouth and her entire neck that persisted. She reports she is non-compliant with her medications due to having a very stressful year and is dealing with immense grief over losing multiple family members but also does not like to follow-up with doctors for fear of finding "something wrong". Patient endorses she has had a decreased appetite and does not eat regularly. She also endorses abdominal bloating and distension. Patient initially hypertensive when arrived in the emergency room and administered 5mg of hydralazine IV. She smokes daily and drinks approximately 1-2 daiquiris a week. Initial ED workup showed a negative CT of her head. CBC showed no signs of infection or anemia. CMP showed elevated liver enzymes along with a calcium of 8.1 and  potassium of 2.8. Magnesium was 0.7 and 2 grams of IV magnesium were administered in ED along with 10meq of K IV " and 50 MEQ PO. 1 gram of calcium gluconate was administered also. Patient endorses that the paresthesias in face and arms have subsided. Patient to be admitted by hospital medicine for further evaluation and management    Past Medical History:   Diagnosis Date    Coronary artery disease     GERD (gastroesophageal reflux disease)     Hearing impaired person, bilateral     Hyperlipidemia     Hypertension     Myocardial infarction        Past Surgical History:   Procedure Laterality Date     SECTION      TYMPANOPLASTY         Review of patient's allergies indicates:   Allergen Reactions    Chantix [varenicline] Other (See Comments)     Mood changes       Current Facility-Administered Medications   Medication Dose Route Frequency Provider Last Rate Last Admin    acetaminophen tablet 650 mg  650 mg Oral Q4H PRN Meaghan Shelby NP        albuterol-ipratropium 2.5 mg-0.5 mg/3 mL nebulizer solution 3 mL  3 mL Nebulization Q6H PRN Meaghan Shelby NP        aluminum-magnesium hydroxide-simethicone 200-200-20 mg/5 mL suspension 30 mL  30 mL Oral QID PRN Meaghan Shelby NP        amLODIPine tablet 5 mg  5 mg Oral Daily Meaghan Shelby, ZEINAB        atorvastatin tablet 80 mg  80 mg Oral QHS Meaghan Shelby, NP        calcium carbonate 200 mg calcium (500 mg) chewable tablet 1,000 mg  1,000 mg Oral TID Meaghan Shelby NP        dextrose 10% bolus 125 mL 125 mL  12.5 g Intravenous PRN Meaghan Shelby, NP        dextrose 10% bolus 250 mL 250 mL  25 g Intravenous PRN Meaghan Shelby, NP        enoxaparin injection 40 mg  40 mg Subcutaneous Daily Meaghan Shelby NP        glucagon (human recombinant) injection 1 mg  1 mg Intramuscular PRN Meaghan Shelby, NP        glucose chewable tablet 16 g  16 g Oral PRN Meaghan Shelby NP        glucose chewable tablet 24 g  24 g Oral PRN Meaghan Shelby, ZEINAB        hydrOXYzine pamoate capsule 25 mg  25 mg Oral Nightly PRN Meaghan Shelby, ZEINAB         magnesium oxide tablet 800 mg  800 mg Oral PRN Meaghan Shelby NP        magnesium oxide tablet 800 mg  800 mg Oral PRN Meaghan Shelby NP        magnesium sulfate 2g in water 50mL IVPB (premix)  2 g Intravenous ED 1 Time Lázaro Moran MD 25 mL/hr at 05/02/24 2004 2 g at 05/02/24 2004    magnesium sulfate 2g in water 50mL IVPB (premix)  2 g Intravenous Once Meaghan Shelby NP        melatonin tablet 6 mg  6 mg Oral Nightly PRN Meaghan Shelby NP        metoprolol succinate (TOPROL-XL) 24 hr tablet 25 mg  25 mg Oral Daily Meaghan Shelby NP        naloxone 0.4 mg/mL injection 0.02 mg  0.02 mg Intravenous PRN Meaghan Shelby NP        [START ON 5/3/2024] nicotine 21 mg/24 hr 1 patch  1 patch Transdermal Daily Meaghan Shelby NP        ondansetron injection 4 mg  4 mg Intravenous Q8H PRN Meaghan Shelby NP        pantoprazole EC tablet 40 mg  40 mg Oral Daily Meaghan Shelby NP        potassium bicarbonate disintegrating tablet 35 mEq  35 mEq Oral PRN Meaghan Shelby NP        potassium bicarbonate disintegrating tablet 50 mEq  50 mEq Oral PRN Meaghan hSelby NP        potassium bicarbonate disintegrating tablet 60 mEq  60 mEq Oral PRN Meaghan Shelby NP        potassium chloride 10 mEq in 100 mL IVPB  10 mEq Intravenous Once ChumaMeaghan buenrostro NP        potassium, sodium phosphates 280-160-250 mg packet 2 packet  2 packet Oral PRN Meaghan Shelby NP        potassium, sodium phosphates 280-160-250 mg packet 2 packet  2 packet Oral PRN ChumaMeaghan buenrostro NP        potassium, sodium phosphates 280-160-250 mg packet 2 packet  2 packet Oral PRN Meaghan Shelby NP        simethicone chewable tablet 80 mg  1 tablet Oral QID PRN Meaghan Shelby NP        sodium chloride 0.9% flush 10 mL  10 mL Intravenous Q12H PRN Meaghan Shelby NP         Current Outpatient Medications   Medication Sig Dispense Refill    ASPIRIN/SALICYLAMIDE/CAFFEINE (ARTHRITIS STRENGTH BC POWDER ORAL)  Take 1 tablet by mouth once daily.      omeprazole (PRILOSEC) 20 MG capsule Take 20 mg by mouth once daily.      amLODIPine (NORVASC) 5 MG tablet Take one tablet by mouth once a day. (Patient not taking: Reported on 2024) 90 tablet 1    atorvastatin (LIPITOR) 80 MG tablet Take one tablet by mouth every pm. (Patient not taking: Reported on 2024) 90 tablet 1    buPROPion (WELLBUTRIN SR) 150 MG TBSR 12 hr tablet Take 1 tablet (150 mg total) by mouth 2 (two) times daily. (Patient not taking: Reported on 2024) 60 tablet 0    metoprolol succinate (TOPROL-XL) 25 MG 24 hr tablet TAKE 1 TABLET DAILY (Patient not taking: Reported on 2024) 90 tablet 1    nicotine (NICODERM CQ) 21 mg/24 hr Place 1 patch onto the skin once daily. (Patient not taking: Reported on 2024) 28 patch 0     Family History       Problem Relation (Age of Onset)    Cancer Mother    Lung cancer Mother    Suicide Father          Tobacco Use    Smoking status: Former     Current packs/day: 0.00     Average packs/day: 0.5 packs/day for 36.0 years (18.0 ttl pk-yrs)     Types: Cigarettes     Start date: 1982     Quit date: 2018     Years since quittin.3    Smokeless tobacco: Never   Substance and Sexual Activity    Alcohol use: Yes     Alcohol/week: 3.0 standard drinks of alcohol     Types: 3 Glasses of wine per week     Comment: pt states about 3 glasses of wine per week    Drug use: No    Sexual activity: Yes     Partners: Male     Comment: long term     Review of Systems   Constitutional:  Positive for appetite change and fatigue.   Gastrointestinal:  Positive for abdominal distention.   Musculoskeletal:  Positive for arthralgias and myalgias.        Endorses muscle spasms   Neurological:  Positive for numbness.        Intermittent numbness to bilateral arms, legs, face and neck   Psychiatric/Behavioral:  The patient is nervous/anxious.    All other systems reviewed and are negative.    Objective:     Vital Signs (Most  Recent):  Temp: 98.2 °F (36.8 °C) (05/02/24 1643)  Pulse: 95 (05/02/24 2058)  Resp: 20 (05/02/24 1643)  BP: (!) 155/77 (05/02/24 2033)  SpO2: 98 % (05/02/24 2058) Vital Signs (24h Range):  Temp:  [98.2 °F (36.8 °C)] 98.2 °F (36.8 °C)  Pulse:  [79-95] 95  Resp:  [20] 20  SpO2:  [96 %-98 %] 98 %  BP: (155-196)/(72-81) 155/77     Weight: 49.9 kg (110 lb)  Body mass index is 21.48 kg/m².     Physical Exam  Vitals reviewed.   Constitutional:       General: She is not in acute distress.     Appearance: Normal appearance.   HENT:      Head: Normocephalic and atraumatic.      Nose: Nose normal.      Mouth/Throat:      Mouth: Mucous membranes are dry.      Pharynx: Oropharynx is clear.   Eyes:      General: Vision grossly intact. No visual field deficit.     Extraocular Movements: Extraocular movements intact.      Pupils: Pupils are equal, round, and reactive to light.        Comments: Blue circles: xanthomas   Cardiovascular:      Rate and Rhythm: Tachycardia present.      Pulses: Normal pulses.      Heart sounds: Normal heart sounds.   Pulmonary:      Effort: Pulmonary effort is normal.      Breath sounds: Normal breath sounds.   Abdominal:      General: Bowel sounds are normal. There is distension.      Palpations: Abdomen is soft.      Tenderness: There is no abdominal tenderness. There is no guarding.   Musculoskeletal:         General: Normal range of motion.      Cervical back: Normal range of motion and neck supple.   Skin:     General: Skin is warm and dry.      Capillary Refill: Capillary refill takes less than 2 seconds.   Neurological:      General: No focal deficit present.      Mental Status: She is alert and oriented to person, place, and time. Mental status is at baseline.      GCS: GCS eye subscore is 4. GCS verbal subscore is 5. GCS motor subscore is 6.      Cranial Nerves: No cranial nerve deficit, dysarthria or facial asymmetry.      Motor: Motor function is intact. No weakness or tremor.       Coordination: Coordination is intact.      Gait: Gait is intact.      Comments: Endorses paresthesias to bilateral legs   Psychiatric:         Attention and Perception: Attention and perception normal.         Mood and Affect: Mood is anxious.         Speech: Speech normal.         Behavior: Behavior normal.         Thought Content: Thought content normal.         Judgment: Judgment normal.              CRANIAL NERVES     CN III, IV, VI   Pupils are equal, round, and reactive to light.       Significant Labs: All pertinent labs within the past 24 hours have been reviewed.  CBC:   Recent Labs   Lab 05/02/24  1710   WBC 6.70   HGB 12.5   HCT 37.6        CMP:   Recent Labs   Lab 05/02/24  1710      K 2.8*   CL 97   CO2 26      BUN 6   CREATININE 0.6   CALCIUM 8.1*   PROT 7.8   ALBUMIN 3.5   BILITOT 0.4   ALKPHOS 93   *   ALT 91*   ANIONGAP 15     Cardiac Markers:   Recent Labs   Lab 05/02/24  1710   BNP 35     Magnesium:   Recent Labs   Lab 05/02/24  1710   MG <0.7*     Troponin:   Recent Labs   Lab 05/02/24  1710 05/02/24  1942   TROPONINI <0.006 <0.006     TSH:   Recent Labs   Lab 05/02/24  1710   TSH 2.897       Significant Imaging: I have reviewed all pertinent imaging results/findings within the past 24 hours.  EXAMINATION:  CT HEAD WITHOUT CONTRAST     CLINICAL HISTORY:  Upper lower extremity paresthesia;     TECHNIQUE:  Routine unenhanced axial images were obtained through the head.  Sagittal and coronal reformatted images were created.  The study is reviewed in bone and soft tissue windows.     COMPARISON:  None     FINDINGS:  Intracranial contents: There is no acute abnormality.  There is no hemorrhage or mass.  Brain volume, ventricular size and position are normal for age.  The gray-white interface is preserved without acute infarction.  There is no abnormal extra-axial fluid collection.  There is no significant white matter change.  The basilar cisterns are open.  Cerebellar  tonsils are normal position.  The orbits are grossly normal.     Extracranial contents, calvarium, soft tissues: The paranasal sinuses and mastoid air cells are grossly clear.  The calvarium is normal.     Impression:     1. There is no acute abnormality.        Electronically signed by:Ethan Arnett MD  Date:                                            05/02/2024  Time:                                           17:44    EXAMINATION:  XR CHEST AP PORTABLE     CLINICAL HISTORY:  Chest Pain;     TECHNIQUE:  A single portable semi-upright AP chest radiograph was acquired.     COMPARISON:  None     FINDINGS:  The cardiomediastinal silhouette is normal in appearance.  There is atherosclerotic calcification present within the thoracic aortic arch.  No pulmonary vascular congestion appreciated. No airspace consolidation or pulmonary mass. No significant volume of pleural fluid or pneumothorax. The bones are unremarkable for age.     Impression:     No acute cardiopulmonary abnormality appreciated radiographically.        Electronically signed by:John Paul Borrero MD  Date:                                            05/02/2024  Time:                                           17:31      Assessment/Plan:     * Paresthesias  Patient endorses decreased appetite and overall decreased PO intake that is likely contributing to electrolyte imbalance that is causing paresthesias. Initial CT of head negative and neuro exam reveals no focal deficit.     -Electrolyte repletion   -CMP and magnesium in AM  -Phosphorus checked upon admit  -UA pending       Transaminitis  Patient reports abdominal bloating and distension accompanied by anorexia.    -Lipid panel in AM  -Ammonia level pending  -Hepatitis panel pending  -CT abdomen/pelvis      Hypokalemia  Patient has hypokalemia which is Acute and currently uncontrolled. Most recent potassium levels reviewed-   Lab Results   Component Value Date    K 2.8 (LL) 05/02/2024   . Will continue  potassium replacement per protocol and recheck repeat levels after replacement completed.     Hypomagnesemia  Patient has Abnormal Magnesium: hypomagnesemia. Will continue to monitor electrolytes closely. Will replace the affected electrolytes and repeat labs to be done after interventions completed. The patient's magnesium results have been reviewed and are listed below.  Recent Labs   Lab 05/02/24  1710   MG <0.7*        Tobacco dependence due to cigarettes  Dangers of cigarette smoking were reviewed with patient in detail. Patient was Counseled for 3-10 minutes. Nicotine replacement options were discussed. Nicotine replacement was discussed- prescribed nicotine patch daily    Hypertension  Chronic, uncontrolled. Latest blood pressure and vitals reviewed-     Temp:  [98.2 °F (36.8 °C)]   Pulse:  [79-95]   Resp:  [20]   BP: (155-196)/(72-81)   SpO2:  [96 %-98 %] .   Home meds for hypertension were reviewed and noted below.   Hypertension Medications               amLODIPine (NORVASC) 5 MG tablet Take one tablet by mouth once a day.    metoprolol succinate (TOPROL-XL) 25 MG 24 hr tablet TAKE 1 TABLET DAILY            While in the hospital, will manage blood pressure as follows; Continue home antihypertensive regimen    Will utilize p.r.n. blood pressure medication only if patient's blood pressure greater than 180/110 and she develops symptoms such as worsening chest pain or shortness of breath.      VTE Risk Mitigation (From admission, onward)           Ordered     enoxaparin injection 40 mg  Daily         05/02/24 2025     IP VTE LOW RISK PATIENT  Once         05/02/24 2025     Place sequential compression device  Until discontinued         05/02/24 2025                         On 05/02/2024, patient should be placed in hospital observation services under my care in collaboration with Dr. Raegan Nazario MD.           Meaghan Shelby NP  Department of Hospital Medicine  Formerly [facility]urg

## 2024-05-03 NOTE — ASSESSMENT & PLAN NOTE
Chronic, uncontrolled. Latest blood pressure and vitals reviewed-     Temp:  [98.2 °F (36.8 °C)]   Pulse:  [79-95]   Resp:  [20]   BP: (155-196)/(72-81)   SpO2:  [96 %-98 %] .   Home meds for hypertension were reviewed and noted below.   Hypertension Medications               amLODIPine (NORVASC) 5 MG tablet Take one tablet by mouth once a day.    metoprolol succinate (TOPROL-XL) 25 MG 24 hr tablet TAKE 1 TABLET DAILY            While in the hospital, will manage blood pressure as follows; Continue home antihypertensive regimen    Will utilize p.r.n. blood pressure medication only if patient's blood pressure greater than 180/110 and she develops symptoms such as worsening chest pain or shortness of breath.

## 2024-05-03 NOTE — PT/OT/SLP EVAL
Physical Therapy Evaluation and Discharge Note    Patient Name:  Dena Stanton   MRN:  [id number]    Recommendations:     Discharge Recommendations: No Therapy Indicated  Discharge Equipment Recommendations: none   Barriers to discharge: None    Assessment:     Dena Stanton is a 58 y.o. female admitted with a medical diagnosis of Paresthesias. .  At this time, patient is functioning at their prior level of function and does not require further acute PT services.     Recent Surgery: * No surgery found *      Plan:     During this hospitalization, patient does not require further acute PT services.  Please re-consult if situation changes.      Subjective     Chief Complaint: fatigue  Patient/Family Comments/goals: go home  Pain/Comfort:       Patients cultural, spiritual, Buddhism conflicts given the current situation:      Living Environment:  Currently lives with spouse in 2 story home but patient reported she does not have to to upstairs.  Prior to admission, patients level of function was independent.  Equipment used at home: none.  DME owned (not currently used): none.  Upon discharge, patient will have assistance from family.    Objective:     Communicated with nurse prior to session.  Patient found supine with bed alarm upon PT entry to room.    General Precautions: Standard, fall    Orthopedic Precautions:N/A   Braces:    Respiratory Status: Room air    Exams:  RLE ROM: WFL  RLE Strength: WNL  LLE ROM: WFL  LLE Strength: WNL    Functional Mobility:  Bed Mobility:     Supine to Sit: independence  Sit to Supine: independence  Transfers:     Sit to Stand:  independence with no AD  Gait: x 250 feet no aD independence    AM-PAC 6 CLICK MOBILITY  Total Score:24       Treatment and Education:  None given    AM-PAC 6 CLICK MOBILITY  Total Score:24     Patient left supine with call button in reach, bed alarm on, nurse notified, and spouse present.    GOALS:   Multidisciplinary Problems       Physical Therapy Goals        Not on file                    History:     Past Medical History:   Diagnosis Date    Coronary artery disease     GERD (gastroesophageal reflux disease)     Hearing impaired person, bilateral     Hyperlipidemia     Hypertension     Myocardial infarction        Past Surgical History:   Procedure Laterality Date     SECTION      TYMPANOPLASTY         Time Tracking:     PT Received On: 24  PT Start Time: 1100     PT Stop Time: 1114  PT Total Time (min): 14 min     Billable Minutes: Evaluation 14      2024

## 2024-05-03 NOTE — PHARMACY MED REC
"              .        Admission Medication History     The home medication history was taken by Cookie Montilla.    You may go to "Admission" then "Reconcile Home Medications" tabs to review and/or act upon these items.     The home medication list has been updated by the Pharmacy department.   Please read ALL comments highlighted in yellow.   Please address this information as you see fit.    Feel free to contact us if you have any questions or require assistance.        Medications listed below were obtained from: Patient/family and Analytic software- [employer]  No current facility-administered medications on file prior to encounter.     Current Outpatient Medications on File Prior to Encounter   Medication Sig Dispense Refill    ASPIRIN/SALICYLAMIDE/CAFFEINE (ARTHRITIS STRENGTH BC POWDER ORAL) Take 1 tablet by mouth once daily.      omeprazole (PRILOSEC) 20 MG capsule Take 20 mg by mouth once daily.      [DISCONTINUED] aspirin/salicylamide/caffeine (BC HEADACHE POWDER ORAL) Take 1 Dose by mouth daily as needed.      amLODIPine (NORVASC) 5 MG tablet Take one tablet by mouth once a day. (Patient not taking: Reported on 5/2/2024) 90 tablet 1    atorvastatin (LIPITOR) 80 MG tablet Take one tablet by mouth every pm. (Patient not taking: Reported on 5/2/2024) 90 tablet 1    buPROPion (WELLBUTRIN SR) 150 MG TBSR 12 hr tablet Take 1 tablet (150 mg total) by mouth 2 (two) times daily. (Patient not taking: Reported on 5/2/2024) 60 tablet 0    metoprolol succinate (TOPROL-XL) 25 MG 24 hr tablet TAKE 1 TABLET DAILY (Patient not taking: Reported on 5/2/2024) 90 tablet 1    nicotine (NICODERM CQ) 21 mg/24 hr Place 1 patch onto the skin once daily. (Patient not taking: Reported on 5/2/2024) 28 patch 0    [DISCONTINUED] lansoprazole (PREVACID) 30 MG capsule Take one capsule by mouth once a day. (Patient taking differently: Take 30 mg by mouth once daily.) 90 capsule 1       Potential issues to be addressed PRIOR TO " DISCHARGE  Patient reported not taking the following medications: (Amlodipine, Atorvastatin, Wellbutrin. Nicotine Patch & Metoprolol ). These medications remain on the home medication list. Please address accordingly.     Cookie Montilla  EXT 1924

## 2024-05-03 NOTE — ASSESSMENT & PLAN NOTE
Patient has hypokalemia which is Acute and currently uncontrolled. Most recent potassium levels reviewed-   Lab Results   Component Value Date    K 2.8 (LL) 05/02/2024   . Will continue potassium replacement per protocol and recheck repeat levels after replacement completed.

## 2024-05-03 NOTE — PLAN OF CARE
Pt clear for DC from CM standpoint. Discharging home.      05/03/24 1232   Final Note   Assessment Type Final Discharge Note   Anticipated Discharge Disposition Home

## 2024-05-03 NOTE — ASSESSMENT & PLAN NOTE
Patient endorses decreased appetite and overall decreased PO intake that is likely contributing to electrolyte imbalance that is causing paresthesias. Initial CT of head negative and neuro exam reveals no focal deficit.     -Electrolyte repletion   -CMP and magnesium in AM  -Phosphorus checked upon admit  -UA pending

## 2024-05-04 LAB
HAV IGM SERPL QL IA: NEGATIVE
HBV CORE IGM SERPL QL IA: NEGATIVE
HBV SURFACE AG SERPL QL IA: NEGATIVE
HCV IGG SERPL QL IA: NEGATIVE

## 2024-05-07 ENCOUNTER — PATIENT MESSAGE (OUTPATIENT)
Dept: FAMILY MEDICINE | Facility: CLINIC | Age: 59
End: 2024-05-07
Payer: COMMERCIAL

## 2024-05-07 LAB
OHS QRS DURATION: 76 MS
OHS QTC CALCULATION: 469 MS

## 2024-05-13 ENCOUNTER — LAB VISIT (OUTPATIENT)
Dept: LAB | Facility: HOSPITAL | Age: 59
End: 2024-05-13
Attending: INTERNAL MEDICINE
Payer: COMMERCIAL

## 2024-05-13 ENCOUNTER — OFFICE VISIT (OUTPATIENT)
Dept: FAMILY MEDICINE | Facility: CLINIC | Age: 59
End: 2024-05-13
Payer: COMMERCIAL

## 2024-05-13 VITALS
OXYGEN SATURATION: 98 % | BODY MASS INDEX: 20.99 KG/M2 | SYSTOLIC BLOOD PRESSURE: 124 MMHG | WEIGHT: 107.5 LBS | DIASTOLIC BLOOD PRESSURE: 70 MMHG | HEART RATE: 99 BPM

## 2024-05-13 DIAGNOSIS — E78.5 HYPERLIPIDEMIA, UNSPECIFIED HYPERLIPIDEMIA TYPE: ICD-10-CM

## 2024-05-13 DIAGNOSIS — E83.51 HYPOCALCEMIA: ICD-10-CM

## 2024-05-13 DIAGNOSIS — E83.42 HYPOMAGNESEMIA: ICD-10-CM

## 2024-05-13 DIAGNOSIS — R20.2 PARESTHESIAS: Primary | ICD-10-CM

## 2024-05-13 DIAGNOSIS — I10 ESSENTIAL HYPERTENSION: ICD-10-CM

## 2024-05-13 DIAGNOSIS — E87.6 HYPOKALEMIA: ICD-10-CM

## 2024-05-13 LAB
25(OH)D3+25(OH)D2 SERPL-MCNC: 7 NG/ML (ref 30–96)
ALBUMIN SERPL BCP-MCNC: 3.6 G/DL (ref 3.5–5.2)
ANION GAP SERPL CALC-SCNC: 16 MMOL/L (ref 8–16)
BUN SERPL-MCNC: 6 MG/DL (ref 6–20)
CALCIUM SERPL-MCNC: 10 MG/DL (ref 8.7–10.5)
CHLORIDE SERPL-SCNC: 98 MMOL/L (ref 95–110)
CO2 SERPL-SCNC: 24 MMOL/L (ref 23–29)
CREAT SERPL-MCNC: 0.7 MG/DL (ref 0.5–1.4)
EST. GFR  (NO RACE VARIABLE): >60 ML/MIN/1.73 M^2
GLUCOSE SERPL-MCNC: 131 MG/DL (ref 70–110)
MAGNESIUM SERPL-MCNC: 1.3 MG/DL (ref 1.6–2.6)
PHOSPHATE SERPL-MCNC: 3.3 MG/DL (ref 2.7–4.5)
POTASSIUM SERPL-SCNC: 4.9 MMOL/L (ref 3.5–5.1)
SODIUM SERPL-SCNC: 138 MMOL/L (ref 136–145)

## 2024-05-13 PROCEDURE — 1159F MED LIST DOCD IN RCRD: CPT | Mod: CPTII,S$GLB,, | Performed by: INTERNAL MEDICINE

## 2024-05-13 PROCEDURE — 99214 OFFICE O/P EST MOD 30 MIN: CPT | Mod: S$GLB,,, | Performed by: INTERNAL MEDICINE

## 2024-05-13 PROCEDURE — 82306 VITAMIN D 25 HYDROXY: CPT | Performed by: INTERNAL MEDICINE

## 2024-05-13 PROCEDURE — 99999 PR PBB SHADOW E&M-EST. PATIENT-LVL III: CPT | Mod: PBBFAC,,, | Performed by: INTERNAL MEDICINE

## 2024-05-13 PROCEDURE — 83735 ASSAY OF MAGNESIUM: CPT | Performed by: INTERNAL MEDICINE

## 2024-05-13 PROCEDURE — 3008F BODY MASS INDEX DOCD: CPT | Mod: CPTII,S$GLB,, | Performed by: INTERNAL MEDICINE

## 2024-05-13 PROCEDURE — 3074F SYST BP LT 130 MM HG: CPT | Mod: CPTII,S$GLB,, | Performed by: INTERNAL MEDICINE

## 2024-05-13 PROCEDURE — 36415 COLL VENOUS BLD VENIPUNCTURE: CPT | Mod: PO | Performed by: INTERNAL MEDICINE

## 2024-05-13 PROCEDURE — 1160F RVW MEDS BY RX/DR IN RCRD: CPT | Mod: CPTII,S$GLB,, | Performed by: INTERNAL MEDICINE

## 2024-05-13 PROCEDURE — 80069 RENAL FUNCTION PANEL: CPT | Performed by: INTERNAL MEDICINE

## 2024-05-13 PROCEDURE — 3078F DIAST BP <80 MM HG: CPT | Mod: CPTII,S$GLB,, | Performed by: INTERNAL MEDICINE

## 2024-05-13 RX ORDER — ATORVASTATIN CALCIUM 80 MG/1
TABLET, FILM COATED ORAL
Qty: 90 TABLET | Refills: 0 | Status: SHIPPED | OUTPATIENT
Start: 2024-05-13

## 2024-05-13 RX ORDER — AMLODIPINE BESYLATE 5 MG/1
TABLET ORAL
Qty: 90 TABLET | Refills: 0 | Status: SHIPPED | OUTPATIENT
Start: 2024-05-13

## 2024-05-13 RX ORDER — METOPROLOL SUCCINATE 25 MG/1
TABLET, EXTENDED RELEASE ORAL
Qty: 90 TABLET | Refills: 0 | Status: SHIPPED | OUTPATIENT
Start: 2024-05-13

## 2024-05-13 NOTE — PROGRESS NOTES
Chief Complaint  Chief Complaint   Patient presents with    Hospital Follow Up    Medication Refill       HPI  Dena Stanton is a 58 y.o. female with multiple medical diagnoses as listed in the medical history and problem list that presents for hospital f/u.  Their last appointment with primary care was Visit date not found.      Pt reports doing okay since discharge about a week ago, was admitted for electrolyte abnormalities. Pt reports she has been under a lot of stress over past few years. 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 Has also had several other losses over past few years and she reports feeling down, low energy, anhedonia, and poor appetite. Had not been eating much over several months and developed symptoms of hypocalcemia and hypomagnesia. Pt was discharged with Mg+ and K+ supplement. Has completed her course, reports feeling nauseous on last day and spent most of the night vomiting. Has been gradually eating better and feels happy she has more time to spend with family now. Pt does not feel an antidepressant is necessary at this time, thinks she is heading in right direction and wants to start exercising more and spending time w/ family, she thinks this will be sufficient. Pt lipid panel in hospital severely elevated, stressed the importance of statin and encouraged to increase exercise. On ROS Pt mentions back pain related to moving grandmother and helping with her showers, has hx of stress fx, will check Vit D today. Pt denies any other complaints at this time, time for questions was provided and all concerns were addressed.      PAST MEDICAL HISTORY:  Past Medical History:   Diagnosis Date    Coronary artery disease     GERD (gastroesophageal reflux disease)     Hearing impaired person, bilateral     Hyperlipidemia     Hypertension     Myocardial infarction 2001       PAST SURGICAL HISTORY:  Past Surgical History:    Procedure Laterality Date     SECTION      TYMPANOPLASTY         SOCIAL HISTORY:  Social History     Socioeconomic History    Marital status: Significant Other    Number of children: 2   Tobacco Use    Smoking status: Former     Current packs/day: 0.00     Average packs/day: 0.5 packs/day for 36.0 years (18.0 ttl pk-yrs)     Types: Cigarettes     Start date: 1982     Quit date: 2018     Years since quittin.4    Smokeless tobacco: Never   Substance and Sexual Activity    Alcohol use: Yes     Alcohol/week: 3.0 standard drinks of alcohol     Types: 3 Glasses of wine per week     Comment: pt states about 3 glasses of wine per week    Drug use: No    Sexual activity: Yes     Partners: Male     Comment: long term     Social Determinants of Health     Financial Resource Strain: Patient Declined (2024)    Overall Financial Resource Strain (CARDIA)     Difficulty of Paying Living Expenses: Patient declined   Food Insecurity: Patient Declined (2024)    Hunger Vital Sign     Worried About Running Out of Food in the Last Year: Patient declined     Ran Out of Food in the Last Year: Patient declined   Transportation Needs: Patient Declined (2024)    PRAPARE - Transportation     Lack of Transportation (Medical): Patient declined     Lack of Transportation (Non-Medical): Patient declined   Physical Activity: Inactive (2024)    Exercise Vital Sign     Days of Exercise per Week: 0 days     Minutes of Exercise per Session: 0 min   Stress: No Stress Concern Present (2024)    [place] [employer] - Occupational Stress Questionnaire     Feeling of Stress : Not at all   Housing Stability: Unknown (2024)    Housing Stability Vital Sign     Unable to Pay for Housing in the Last Year: Patient declined       FAMILY HISTORY:  Family History   Problem Relation Name Age of Onset    Cancer Mother      Lung cancer Mother      Suicide Father         ALLERGIES AND MEDICATIONS:  updated and reviewed.  Review of patient's allergies indicates:   Allergen Reactions    Chantix [varenicline] Other (See Comments)     Mood changes     Current Outpatient Medications   Medication Sig Dispense Refill    ASPIRIN/SALICYLAMIDE/CAFFEINE (ARTHRITIS STRENGTH BC POWDER ORAL) Take 1 tablet by mouth once daily.      omeprazole (PRILOSEC) 20 MG capsule Take 20 mg by mouth once daily.      amLODIPine (NORVASC) 5 MG tablet Take one tablet by mouth once a day. 90 tablet 0    atorvastatin (LIPITOR) 80 MG tablet Take one tablet by mouth every pm. 90 tablet 0    metoprolol succinate (TOPROL-XL) 25 MG 24 hr tablet TAKE 1 TABLET DAILY 90 tablet 0     No current facility-administered medications for this visit.         ROS  Review of Systems   Constitutional:  Positive for appetite change. Negative for fever.   HENT:  Negative for congestion and sore throat.    Eyes:  Negative for visual disturbance.   Respiratory:  Negative for cough and shortness of breath.    Cardiovascular:  Negative for chest pain.   Gastrointestinal:  Positive for nausea and vomiting. Negative for abdominal pain, constipation and diarrhea.   Genitourinary:  Negative for difficulty urinating and dysuria.   Musculoskeletal:  Positive for back pain. Negative for joint swelling and neck pain.   Skin:  Negative for color change and rash.   Neurological:  Negative for dizziness and syncope.   Psychiatric/Behavioral:  Negative for agitation and confusion. The patient is nervous/anxious.            Physical Exam  Vitals:    05/13/24 1005   BP: 124/70   Pulse: 99   SpO2: 98%   Weight: 48.8 kg (107 lb 7.6 oz)    Body mass index is 20.99 kg/m².  Weight: 48.8 kg (107 lb 7.6 oz)       Physical Exam  Constitutional:       General: She is not in acute distress.     Appearance: Normal appearance.   HENT:      Head: Normocephalic.      Nose: Nose normal.   Eyes:      Pupils: Pupils are equal, round, and reactive to light.   Cardiovascular:      Rate and Rhythm:  Normal rate and regular rhythm.      Pulses: Normal pulses.      Heart sounds: Normal heart sounds. No murmur heard.  Pulmonary:      Effort: Pulmonary effort is normal. No respiratory distress.      Breath sounds: Normal breath sounds.   Abdominal:      General: Bowel sounds are normal.      Tenderness: There is no abdominal tenderness.   Musculoskeletal:      Cervical back: Normal range of motion.   Skin:     General: Skin is warm and dry.      Comments: Xanthelasma, bilateral    Neurological:      General: No focal deficit present.      Mental Status: She is alert and oriented to person, place, and time.   Psychiatric:         Behavior: Behavior normal.      Comments: Tearful during hx           Health Maintenance         Date Due Completion Date    Cervical Cancer Screening Never done ---    HIV Screening Never done ---    Colorectal Cancer Screening Never done ---    Shingles Vaccine (1 of 2) Never done ---    Mammogram 02/01/2019 2/1/2018 (Declined)    Override on 2/1/2018: Declined    COVID-19 Vaccine (1 - 2023-24 season) Never done ---    Influenza Vaccine (Season Ended) 09/01/2024 10/12/2015    High Dose Statin 05/03/2025 5/3/2024    TETANUS VACCINE 10/02/2027 10/2/2017    Lipid Panel 05/03/2029 5/3/2024              Assessment and Plan:  Paresthesias    Hypokalemia  -     RENAL FUNCTION PANEL; Future; Expected date: 05/13/2024    Hypomagnesemia  -     Magnesium; Future; Expected date: 05/13/2024    Hypocalcemia  -     Vitamin D; Future; Expected date: 05/13/2024    Essential hypertension  -     amLODIPine (NORVASC) 5 MG tablet; Take one tablet by mouth once a day.  Dispense: 90 tablet; Refill: 0  -     metoprolol succinate (TOPROL-XL) 25 MG 24 hr tablet; TAKE 1 TABLET DAILY  Dispense: 90 tablet; Refill: 0    Hyperlipidemia, unspecified hyperlipidemia type  -     atorvastatin (LIPITOR) 80 MG tablet; Take one tablet by mouth every pm.  Dispense: 90 tablet; Refill: 0

## 2024-05-14 DIAGNOSIS — E55.9 VITAMIN D DEFICIENCY: Primary | ICD-10-CM

## 2024-05-14 RX ORDER — ASPIRIN 325 MG
50000 TABLET, DELAYED RELEASE (ENTERIC COATED) ORAL
Qty: 12 CAPSULE | Refills: 0 | Status: SHIPPED | OUTPATIENT
Start: 2024-05-14 | End: 2024-07-31

## 2024-05-22 ENCOUNTER — TELEPHONE (OUTPATIENT)
Dept: FAMILY MEDICINE | Facility: CLINIC | Age: 59
End: 2024-05-22
Payer: COMMERCIAL

## 2024-05-22 NOTE — TELEPHONE ENCOUNTER
Dr. Gilliam will be out of office 5/29. Rescheduled Est Care Appt 6/10 @ 2:30pm w Dr. Jacinto. Pt JULIANA.

## 2024-06-10 ENCOUNTER — OFFICE VISIT (OUTPATIENT)
Dept: FAMILY MEDICINE | Facility: CLINIC | Age: 59
End: 2024-06-10
Payer: COMMERCIAL

## 2024-06-10 VITALS
OXYGEN SATURATION: 97 % | DIASTOLIC BLOOD PRESSURE: 84 MMHG | BODY MASS INDEX: 21.62 KG/M2 | HEIGHT: 60 IN | HEART RATE: 82 BPM | WEIGHT: 110.13 LBS | SYSTOLIC BLOOD PRESSURE: 125 MMHG

## 2024-06-10 DIAGNOSIS — F17.210 TOBACCO DEPENDENCE DUE TO CIGARETTES: ICD-10-CM

## 2024-06-10 DIAGNOSIS — I10 PRIMARY HYPERTENSION: ICD-10-CM

## 2024-06-10 DIAGNOSIS — I25.2 MYOCARDIAL INFARCT, OLD: ICD-10-CM

## 2024-06-10 DIAGNOSIS — Z13.820 OSTEOPOROSIS SCREENING: ICD-10-CM

## 2024-06-10 DIAGNOSIS — S22.000A COMPRESSION FRACTURE OF BODY OF THORACIC VERTEBRA: ICD-10-CM

## 2024-06-10 DIAGNOSIS — E55.9 VITAMIN D DEFICIENCY: ICD-10-CM

## 2024-06-10 DIAGNOSIS — Z76.89 ENCOUNTER TO ESTABLISH CARE WITH NEW DOCTOR: Primary | ICD-10-CM

## 2024-06-10 DIAGNOSIS — E78.2 MIXED HYPERLIPIDEMIA: ICD-10-CM

## 2024-06-10 DIAGNOSIS — Z12.11 SCREENING FOR COLON CANCER: ICD-10-CM

## 2024-06-10 DIAGNOSIS — Z12.2 ENCOUNTER FOR SCREENING FOR LUNG CANCER: ICD-10-CM

## 2024-06-10 DIAGNOSIS — Z78.0 POST-MENOPAUSAL: ICD-10-CM

## 2024-06-10 DIAGNOSIS — E87.6 HYPOKALEMIA: ICD-10-CM

## 2024-06-10 DIAGNOSIS — E83.42 HYPOMAGNESEMIA: ICD-10-CM

## 2024-06-10 DIAGNOSIS — R20.2 PARESTHESIAS: ICD-10-CM

## 2024-06-10 DIAGNOSIS — R74.01 TRANSAMINITIS: ICD-10-CM

## 2024-06-10 DIAGNOSIS — M19.90 ARTHRITIS: ICD-10-CM

## 2024-06-10 DIAGNOSIS — Z12.4 SCREENING FOR CERVICAL CANCER: ICD-10-CM

## 2024-06-10 DIAGNOSIS — Z00.00 WELLNESS EXAMINATION: ICD-10-CM

## 2024-06-10 DIAGNOSIS — Z12.31 ENCOUNTER FOR SCREENING MAMMOGRAM FOR MALIGNANT NEOPLASM OF BREAST: ICD-10-CM

## 2024-06-10 DIAGNOSIS — E78.01 FAMILIAL HYPERCHOLESTEROLEMIA: ICD-10-CM

## 2024-06-10 PROCEDURE — 99999 PR PBB SHADOW E&M-EST. PATIENT-LVL V: CPT | Mod: PBBFAC,,, | Performed by: STUDENT IN AN ORGANIZED HEALTH CARE EDUCATION/TRAINING PROGRAM

## 2024-06-10 PROCEDURE — 3074F SYST BP LT 130 MM HG: CPT | Mod: CPTII,S$GLB,, | Performed by: STUDENT IN AN ORGANIZED HEALTH CARE EDUCATION/TRAINING PROGRAM

## 2024-06-10 PROCEDURE — 3079F DIAST BP 80-89 MM HG: CPT | Mod: CPTII,S$GLB,, | Performed by: STUDENT IN AN ORGANIZED HEALTH CARE EDUCATION/TRAINING PROGRAM

## 2024-06-10 PROCEDURE — 99386 PREV VISIT NEW AGE 40-64: CPT | Mod: S$GLB,,, | Performed by: STUDENT IN AN ORGANIZED HEALTH CARE EDUCATION/TRAINING PROGRAM

## 2024-06-10 PROCEDURE — 1160F RVW MEDS BY RX/DR IN RCRD: CPT | Mod: CPTII,S$GLB,, | Performed by: STUDENT IN AN ORGANIZED HEALTH CARE EDUCATION/TRAINING PROGRAM

## 2024-06-10 PROCEDURE — 3008F BODY MASS INDEX DOCD: CPT | Mod: CPTII,S$GLB,, | Performed by: STUDENT IN AN ORGANIZED HEALTH CARE EDUCATION/TRAINING PROGRAM

## 2024-06-10 PROCEDURE — 1159F MED LIST DOCD IN RCRD: CPT | Mod: CPTII,S$GLB,, | Performed by: STUDENT IN AN ORGANIZED HEALTH CARE EDUCATION/TRAINING PROGRAM

## 2024-06-10 NOTE — PROGRESS NOTES
Subjective:       Patient ID: Dena Stanton is a 58 y.o. female.    Chief Complaint: Health Maintenance    58 year old female presents to [facility] care. She works at the [employer] in the [employer] department working with the [place] [employer] system. She lost her grandmother just a few weeks ago. She has seen Vandana Kelly in smoking cessation and is not ready to go back at this time. She has not been able to quit as of yet, but it is not due to any fault of Vandana. She will go get her eyes checked but does not go to the doctor. She has a spot on her neck which doesn't bother her more than just knowing it is there and something to look at. It does not worry her. The patient was recently admitted for observation to the hospital when she presented with tingling in her hands that she thought was due to a cream she uses for arthritis pain.  Lab work showed eventually that she was severely low in potassium as well as magnesium; liver associated enzymes were elevated and acute hepatitis panel was negative.  Workup at that time also included imaging including CT of the head without contrast was unremarkable; portable chest x-ray was unremarkable; EKG at the time was normal sinus rhythm with nonspecific ST abnormality and occasional premature ventricular complexes. Abdominal imaging with CT of the abdomen without contrast was performed and revealed small hiatal hernia, colonic diverticulosis, and several age indeterminate compression fractures, most severe at T12 and L1.  Further review of the findings in the radiology report include compression fractures at T9, T12, L1, and L4; with slight retropulsion of the T12 endplate by 4 mm and at L1 by 3 mm each contributing to a degree of bony canal stenosis. The patient admits that at that time she had not eaten for 3 days related to feeling sad about recent loss of her grandmother whom she did take care of before she .  The patient admits that she has done better in making  sure to eat some every day since then.  She is not perfect but much improved.  She has not had anymore tingling in her hands.  We discussed that by imaging alone the patient may hold a diagnosis of osteoporosis for which medication can be started to decrease the risk of osteoporotic fracture.  We calculate her FRAX score and she has not interested in medicine but does agree to screening with scan for bone mineral density. Of note, she has been taking vitamin D as prescribed once weekly.    When asked about further preventative health, she is reluctant to take medicine, but agrees with the following plan:     Refer to gynecology for cervical cancer screening.  Order screening colonoscopy for colon cancer screening.  Order mammogram for breast cancer screening.  Order low-dose CT of the chest for lung cancer screening.  Order DEXA scan for bone density screening for osteoporosis.  Routine blood work including CBC, CMP, lipid panel, magnesium, and vitamin-D 25 hydroxy.    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 Patient becomes tearful at times during the history, especially when speaking about lost loved ones, but regains her composure.    Further review of record reveals history of CAD and remote MI.        Past Medical History:   Diagnosis Date    Coronary artery disease     GERD (gastroesophageal reflux disease)     Hearing impaired person, bilateral     Hyperlipidemia     Hypertension     Myocardial infarction        Past Surgical History:   Procedure Laterality Date     SECTION      TYMPANOPLASTY         Review of patient's allergies indicates:   Allergen Reactions    Chantix [varenicline] Other (See Comments)     Mood changes       Social History     Socioeconomic History    Marital status: Significant Other    Number of children: 2   Tobacco Use    Smoking status: Every Day     Current packs/day: 0.50     Average packs/day: 0.5 packs/day for 41.5 years  (20.8 ttl pk-yrs)     Types: Cigarettes     Start date: 12/13/1982    Smokeless tobacco: Never   Substance and Sexual Activity    Alcohol use: Yes     Alcohol/week: 3.0 standard drinks of alcohol     Types: 3 Glasses of wine per week     Comment: pt states about 3 glasses of wine per week    Drug use: No    Sexual activity: Yes     Partners: Male     Comment: long term     Social Determinants of Health     Financial Resource Strain: Patient Declined (6/10/2024)    Overall Financial Resource Strain (CARDIA)     Difficulty of Paying Living Expenses: Patient declined   Food Insecurity: Patient Declined (6/10/2024)    Hunger Vital Sign     Worried About Running Out of Food in the Last Year: Patient declined     Ran Out of Food in the Last Year: Patient declined   Transportation Needs: Patient Declined (5/7/2024)    PRAPARE - Transportation     Lack of Transportation (Medical): Patient declined     Lack of Transportation (Non-Medical): Patient declined   Physical Activity: Inactive (6/10/2024)    Exercise Vital Sign     Days of Exercise per Week: 0 days     Minutes of Exercise per Session: 0 min   Stress: No Stress Concern Present (6/10/2024)    [place] [employer] - Occupational Stress Questionnaire     Feeling of Stress : Not at all   Housing Stability: Unknown (6/10/2024)    Housing Stability Vital Sign     Unable to Pay for Housing in the Last Year: Patient declined       Current Outpatient Medications on File Prior to Visit   Medication Sig Dispense Refill    amLODIPine (NORVASC) 5 MG tablet Take one tablet by mouth once a day. 90 tablet 0    ASPIRIN/SALICYLAMIDE/CAFFEINE (ARTHRITIS STRENGTH BC POWDER ORAL) Take 1 tablet by mouth once daily.      atorvastatin (LIPITOR) 80 MG tablet Take one tablet by mouth every pm. 90 tablet 0    cholecalciferol, vitamin D3, 1,250 mcg (50,000 unit) capsule Take 1 capsule (50,000 Units total) by mouth every 7 days. for 12 doses 12 capsule 0    metoprolol  succinate (TOPROL-XL) 25 MG 24 hr tablet TAKE 1 TABLET DAILY 90 tablet 0    omeprazole (PRILOSEC) 20 MG capsule Take 20 mg by mouth once daily.       No current facility-administered medications on file prior to visit.       Family History   Problem Relation Name Age of Onset    Cancer Mother      Lung cancer Mother      Suicide Father         Review of Systems    Objective:      /84 (BP Location: Right arm)   Pulse 82   Ht 5' (1.524 m)   Wt 50 kg (110 lb 1.9 oz)   SpO2 97%   BMI 21.51 kg/m²   Physical Exam  HENT:      Mouth/Throat:      Mouth: Mucous membranes are moist.      Pharynx: Oropharynx is clear.   Eyes:      Pupils: Pupils are equal, round, and reactive to light.   Cardiovascular:      Rate and Rhythm: Normal rate and regular rhythm.      Pulses: Normal pulses.      Heart sounds: Normal heart sounds.   Pulmonary:      Effort: Pulmonary effort is normal.      Breath sounds: Normal breath sounds.   Abdominal:      General: Abdomen is flat. Bowel sounds are normal.      Palpations: Abdomen is soft.   Musculoskeletal:      Right lower leg: No edema.      Left lower leg: No edema.   Lymphadenopathy:      Cervical: No cervical adenopathy.   Skin:     General: Skin is warm and dry.      Comments: Bilateral inferior periorbital xanthomas  Left neck, anterior aspect, soft tissue lesion suspicious for xanthoma v lipoma   Neurological:      General: No focal deficit present.      Mental Status: She is alert.   Psychiatric:         Mood and Affect: Mood normal.         Behavior: Behavior normal.         Assessment:       1. Encounter to establish care with new doctor    2. Tobacco dependence due to cigarettes    3. Wellness examination    4. Screening for cervical cancer    5. Screening for colon cancer    6. Encounter for screening mammogram for malignant neoplasm of breast    7. Compression fracture of body of thoracic vertebra    8. Post-menopausal    9. Osteoporosis screening    10. Vitamin D  deficiency    11. Hypokalemia    12. Primary hypertension    13. Paresthesias    14. Transaminitis    15. Hypomagnesemia    16. Mixed hyperlipidemia    17. Arthritis    18. Encounter for screening for lung cancer    19. Myocardial infarct, old    20. Familial hypercholesterolemia        Plan:       Encounter to establish care with new doctor    Plan:  Patient agrees to return in 2 weeks for lab visit as well as nurse visit for blood pressure check.  Patient agrees to return in 3 months for doctor's visit.  Ordered screening with Pap smear, mammogram, bone density scan, and colonoscopy - all to be completed step by step over the next few months, 1 thing at a time.  Patient will continue on her smoking cessation journey on her own and declines to return to smoking cessation for now.    Tobacco dependence due to cigarettes  -     CBC Auto Differential; Future; Expected date: 06/24/2024    Wellness examination    Screening for cervical cancer  -     Ambulatory referral/consult to Gynecology; Future; Expected date: 07/22/2024    Screening for colon cancer  -     Case Request Endoscopy: COLONOSCOPY    Encounter for screening mammogram for malignant neoplasm of breast  -     Mammo Digital Screening Bilat w/ Angelito; Future; Expected date: 07/10/2024    Compression fracture of body of thoracic vertebra    Post-menopausal    Osteoporosis screening  -     DXA Bone Density Axial Skeleton 1 or more sites; Future; Expected date: 06/24/2024    Vitamin D deficiency  -     Misc Sendout Test, Blood 25-hydroxy vitamin D; Future; Expected date: 06/10/2024    Hypokalemia  -     Comprehensive Metabolic Panel; Future; Expected date: 06/24/2024    Primary hypertension  -     Comprehensive Metabolic Panel; Future; Expected date: 06/24/2024    Paresthesias    Transaminitis  - May be related to mixed hyperlipidemia, levels suspicious for familial hypercholesterolemia. Will consider discussion of adding to current high dose statin therapy with  zetia, omega 3 fatty acids, bempedoic acid, pcsk-9 inhibitors.    Hypomagnesemia  -     Magnesium; Future; Expected date: 06/10/2024    Mixed hyperlipidemia  -     Lipid Panel; Future; Expected date: 06/10/2024    Arthritis  - uses topical cream on her hands for arthritis related pain.    Encounter for screening for lung cancer  -     CT Chest Lung Screening Low Dose; Future; Expected date: 09/10/2024    Remote history of MI, CAD  - On high dose statin therapy.  - Current medications do not include aspirin or anti-platelet therapy.    Familial hypercholesterolemia  - Review of chart and literature reveals diagnosis of familial cholesterolemia in this female patient with remote history of MI under the age of 60 years in addition to LDL greater than 190 (342 on 5/3/24). Triglycerides are also elevated which may be separate as trigs are usually normal in familial hypercholesterolemia.  - This patient has a son around the age of 18 years - the patient and especially her son may benefit from testing including level of apolipoprotein a, as well as genetic testing for apo b, LDLR, and PCSK-9 mutations.  - Chart does not reveal further information of diagnosis of MI. Will reach out to patient to return for doctor's visit instead of nurse's visit to further discuss adding aspirin and possibly antiplatelet therapy in addition to possible referrals to cardiology and genetics for not only herself but also in consideration of her son.        Counseled on regular exercise, maintenance of a healthy weight, balanced diet rich in fruits/vegetables and lean protein, and avoidance of unhealthy habits like smoking and excessive alcohol intake.    Continue smoking cessation discussion.  Return in two weeks for nurse visit for BP check and labs.  Return to doctor's visit in three months, or sooner if needed.  Preventative screenings ordered.    Visit today included increased complexity associated with the care of the episodic problem  hypokalemia addressed and managing the longitudinal care of the patient due to the serious and/or complex managed problem(s) familial hypercholesterolemia.

## 2024-06-16 PROBLEM — E78.01 FAMILIAL HYPERCHOLESTEROLEMIA: Status: ACTIVE | Noted: 2024-06-16

## 2024-06-16 PROBLEM — Z12.4 SCREENING FOR CERVICAL CANCER: Status: RESOLVED | Noted: 2024-06-10 | Resolved: 2024-06-16

## 2024-06-17 ENCOUNTER — HOSPITAL ENCOUNTER (OUTPATIENT)
Dept: RADIOLOGY | Facility: HOSPITAL | Age: 59
Discharge: HOME | End: 2024-06-17
Attending: STUDENT IN AN ORGANIZED HEALTH CARE EDUCATION/TRAINING PROGRAM
Payer: COMMERCIAL

## 2024-06-17 DIAGNOSIS — Z12.2 ENCOUNTER FOR SCREENING FOR LUNG CANCER: ICD-10-CM

## 2024-06-17 PROCEDURE — 71271 CT THORAX LUNG CANCER SCR C-: CPT | Mod: TC,PO

## 2024-06-17 PROCEDURE — 71271 CT THORAX LUNG CANCER SCR C-: CPT | Mod: 26,,, | Performed by: RADIOLOGY

## 2024-06-18 ENCOUNTER — TELEPHONE (OUTPATIENT)
Dept: GASTROENTEROLOGY | Facility: CLINIC | Age: 59
End: 2024-06-18
Payer: COMMERCIAL

## 2024-06-18 DIAGNOSIS — R91.8 OTHER NONSPECIFIC ABNORMAL FINDING OF LUNG FIELD: ICD-10-CM

## 2024-06-18 DIAGNOSIS — R91.1 SOLITARY PULMONARY NODULE: Primary | ICD-10-CM

## 2024-06-18 NOTE — TELEPHONE ENCOUNTER
Diagnosis is confirmed from the case order: Screening  BMI: 21.51  First Colonoscopy? Yes  Family history of colon cancer? no  Medical issues? None requiring Golytely  Blood thinners?No  Preferred day: Mondays in September or October in [place].   · Family member will be pt's   Inform pt I will send prep instructions via [facility].   Pt verbalizes understanding to the statements above.

## 2024-07-02 ENCOUNTER — TELEPHONE (OUTPATIENT)
Dept: GASTROENTEROLOGY | Facility: CLINIC | Age: 59
End: 2024-07-02
Payer: COMMERCIAL

## 2024-07-02 ENCOUNTER — HOSPITAL ENCOUNTER (OUTPATIENT)
Dept: RADIOLOGY | Facility: HOSPITAL | Age: 59
Discharge: HOME | End: 2024-07-02
Attending: STUDENT IN AN ORGANIZED HEALTH CARE EDUCATION/TRAINING PROGRAM
Payer: COMMERCIAL

## 2024-07-02 DIAGNOSIS — Z13.820 OSTEOPOROSIS SCREENING: ICD-10-CM

## 2024-07-02 PROCEDURE — 77091 TBS TECHL CALCULATION ONLY: CPT | Mod: PO

## 2024-07-02 PROCEDURE — 77080 DXA BONE DENSITY AXIAL: CPT | Mod: TC,PO

## 2024-07-02 NOTE — TELEPHONE ENCOUNTER
Colonoscopy 9/23 at 10am arrive for 9am  instructions reviewed and patient states understanding. Copy to portal.

## 2024-07-03 ENCOUNTER — TELEPHONE (OUTPATIENT)
Dept: FAMILY MEDICINE | Facility: CLINIC | Age: 59
End: 2024-07-03
Payer: COMMERCIAL

## 2024-07-03 NOTE — TELEPHONE ENCOUNTER
Spoke with Ms. Fernandes - She stated that she would be here for her appt on 7/11/24 and she has agreed to starting the new medication but wants to hold off on being referred to Hepatology and the abdominal U/S. Told pt I would forward the message to Dr. Jacinto and let her know. Pt JULIANA.

## 2024-07-03 NOTE — TELEPHONE ENCOUNTER
----- Message from Sweta Winter sent at 7/3/2024  4:13 PM CDT -----  Type:  Patient Returning Call    Who Called:  Pt    Who Left Message for Patient:  Rachna    Does the patient know what this is regarding?:  yes    Would the patient rather a call back or a response via [facility]?  Call back    Best Call Back Number:  [phone]    Additional Information:    Please call back to advise. Thanks!

## 2024-07-03 NOTE — TELEPHONE ENCOUNTER
----- Message from Tri Jacinto DO sent at 7/2/2024  6:55 PM CDT -----  Regarding: Please call this patient to schedule follow up appointment  I would like to refer her to hepatology for elevated liver enzymes and order an abdominal US to look at her liver. I would like to start a medication for her cholesterol in addition to continuing her statin. It's called ezetimibe (Zetia) 10 mg one tab by mouth daily. Can we recheck her labs in three months, please? If this is okay with her I will place those orders. Thank you.

## 2024-07-03 NOTE — TELEPHONE ENCOUNTER
Lt VM message to call office.    Pt's already scheduled a F/U appt Thurs 7/11/24 @ 9:30 am to discuss labs. Do you want me to schedule another appt with her? I also sent her a message through her [employer].

## 2024-07-11 ENCOUNTER — OFFICE VISIT (OUTPATIENT)
Dept: FAMILY MEDICINE | Facility: CLINIC | Age: 59
End: 2024-07-11
Payer: COMMERCIAL

## 2024-07-11 ENCOUNTER — HOSPITAL ENCOUNTER (OUTPATIENT)
Dept: RADIOLOGY | Facility: HOSPITAL | Age: 59
Discharge: HOME | End: 2024-07-11
Attending: STUDENT IN AN ORGANIZED HEALTH CARE EDUCATION/TRAINING PROGRAM
Payer: COMMERCIAL

## 2024-07-11 ENCOUNTER — TELEPHONE (OUTPATIENT)
Dept: RADIOLOGY | Facility: HOSPITAL | Age: 59
End: 2024-07-11

## 2024-07-11 VITALS
SYSTOLIC BLOOD PRESSURE: 150 MMHG | WEIGHT: 108.25 LBS | DIASTOLIC BLOOD PRESSURE: 82 MMHG | HEART RATE: 85 BPM | OXYGEN SATURATION: 94 % | HEIGHT: 60 IN | TEMPERATURE: 98 F | BODY MASS INDEX: 21.25 KG/M2

## 2024-07-11 DIAGNOSIS — Z12.31 ENCOUNTER FOR SCREENING MAMMOGRAM FOR MALIGNANT NEOPLASM OF BREAST: ICD-10-CM

## 2024-07-11 DIAGNOSIS — N63.20 MASS OF LEFT BREAST, UNSPECIFIED QUADRANT: ICD-10-CM

## 2024-07-11 DIAGNOSIS — E78.1 HIGH TRIGLYCERIDES: ICD-10-CM

## 2024-07-11 DIAGNOSIS — E78.01 FAMILIAL HYPERCHOLESTEROLEMIA: Primary | ICD-10-CM

## 2024-07-11 PROCEDURE — 3079F DIAST BP 80-89 MM HG: CPT | Mod: CPTII,S$GLB,, | Performed by: STUDENT IN AN ORGANIZED HEALTH CARE EDUCATION/TRAINING PROGRAM

## 2024-07-11 PROCEDURE — 77063 BREAST TOMOSYNTHESIS BI: CPT | Mod: TC,PO

## 2024-07-11 PROCEDURE — 77067 SCR MAMMO BI INCL CAD: CPT | Mod: 26,,, | Performed by: RADIOLOGY

## 2024-07-11 PROCEDURE — 99999 PR PBB SHADOW E&M-EST. PATIENT-LVL IV: CPT | Mod: PBBFAC,,, | Performed by: STUDENT IN AN ORGANIZED HEALTH CARE EDUCATION/TRAINING PROGRAM

## 2024-07-11 PROCEDURE — 1159F MED LIST DOCD IN RCRD: CPT | Mod: CPTII,S$GLB,, | Performed by: STUDENT IN AN ORGANIZED HEALTH CARE EDUCATION/TRAINING PROGRAM

## 2024-07-11 PROCEDURE — 3077F SYST BP >= 140 MM HG: CPT | Mod: CPTII,S$GLB,, | Performed by: STUDENT IN AN ORGANIZED HEALTH CARE EDUCATION/TRAINING PROGRAM

## 2024-07-11 PROCEDURE — 3008F BODY MASS INDEX DOCD: CPT | Mod: CPTII,S$GLB,, | Performed by: STUDENT IN AN ORGANIZED HEALTH CARE EDUCATION/TRAINING PROGRAM

## 2024-07-11 PROCEDURE — 77067 SCR MAMMO BI INCL CAD: CPT | Mod: TC,PO

## 2024-07-11 PROCEDURE — 77063 BREAST TOMOSYNTHESIS BI: CPT | Mod: 26,,, | Performed by: RADIOLOGY

## 2024-07-11 PROCEDURE — 99214 OFFICE O/P EST MOD 30 MIN: CPT | Mod: S$GLB,,, | Performed by: STUDENT IN AN ORGANIZED HEALTH CARE EDUCATION/TRAINING PROGRAM

## 2024-07-11 RX ORDER — EZETIMIBE 10 MG/1
10 TABLET ORAL DAILY
Qty: 90 TABLET | Refills: 3 | Status: SHIPPED | OUTPATIENT
Start: 2024-07-11 | End: 2025-07-11

## 2024-07-11 RX ORDER — ACETAMINOPHEN 160 MG/5ML
400 SUSPENSION, ORAL (FINAL DOSE FORM) ORAL DAILY
Qty: 90 CAPSULE | Refills: 0 | Status: SHIPPED | OUTPATIENT
Start: 2024-07-11 | End: 2025-07-11

## 2024-07-11 RX ORDER — ICOSAPENT ETHYL 1 G/1
2 CAPSULE ORAL 2 TIMES DAILY
Qty: 60 CAPSULE | Refills: 2 | Status: SHIPPED | OUTPATIENT
Start: 2024-07-11

## 2024-07-11 NOTE — PATIENT INSTRUCTIONS
Start Zetia 10 mg one tab by mouth daily.  Start CoQ10 400 mg daily.  If muscle aches develop, let me know and begin CoQ10 (coeznayme q10) 400 mg one tab by mouth daily.  Tell your children to get cholesterol tested and if normal they need further cholesterol testing

## 2024-07-11 NOTE — PROGRESS NOTES
Subjective:       Patient ID: Dena Stanton is a 58 y.o. female.    Chief Complaint: Follow-up    58-year-old female presents in follow-up after establish care visit and labs are consistent with familial hypercholesterolemia.  Patient has known osteoporosis and is not amenable to taking the medicine.  She is working on quitting smoking on her own at this time.  She is interested in increasing her statin medication in adding Zetia and coenzyme Q10 and fish oil to her current medicines.  She verbalizes understanding and agrees with the plan that first-degree relatives including her son should have there cholesterol checked and if normal should have extended cholesterol panel performed for lipid disorders as there is a possible genetic component.  She will read about bempedoic acid and we will discuss this further on follow up after recheck of fasting lipid panel. Today I have prescribed coenzyme q10 400 mg once daily, icosapent ethyl 2 g twice daily, and ezetimibe 10 mg once daily. She will return to clinic in three months or sooner if needed - and reach out with questions.    The patient had her screening mammogram performed and there is an abnormal finding. Blood pressure is elevated today - she has been here for a while this morning and really needs to leave to get back to work. This may contributing to the reading. During this encounter she missed a call  and we listened to a voicemail from Luciana Kessler calling to schedule the diagnostic mammogram. The patient anticipates dental work soon which may complicate her scheduling but she will get it scheduled.    Follow-up        Past Medical History:   Diagnosis Date    Coronary artery disease     GERD (gastroesophageal reflux disease)     Hearing impaired person, bilateral     Hyperlipidemia     Hypertension     Myocardial infarction        Past Surgical History:   Procedure Laterality Date     SECTION      TYMPANOPLASTY         Review of patient's  allergies indicates:   Allergen Reactions    Chantix [varenicline] Other (See Comments)     Mood changes       Social History     Socioeconomic History    Marital status: Significant Other    Number of children: 2   Tobacco Use    Smoking status: Every Day     Current packs/day: 0.50     Average packs/day: 0.5 packs/day for 41.6 years (20.8 ttl pk-yrs)     Types: Cigarettes     Start date: 12/13/1982    Smokeless tobacco: Never   Substance and Sexual Activity    Alcohol use: Yes     Alcohol/week: 3.0 standard drinks of alcohol     Types: 3 Glasses of wine per week     Comment: pt states about 3 glasses of wine per week    Drug use: No    Sexual activity: Yes     Partners: Male     Comment: long term     Social Determinants of Health     Financial Resource Strain: Patient Declined (6/10/2024)    Overall Financial Resource Strain (CARDIA)     Difficulty of Paying Living Expenses: Patient declined   Food Insecurity: Patient Declined (6/10/2024)    Hunger Vital Sign     Worried About Running Out of Food in the Last Year: Patient declined     Ran Out of Food in the Last Year: Patient declined   Transportation Needs: Patient Declined (5/7/2024)    PRAPARE - Transportation     Lack of Transportation (Medical): Patient declined     Lack of Transportation (Non-Medical): Patient declined   Physical Activity: Inactive (6/10/2024)    Exercise Vital Sign     Days of Exercise per Week: 0 days     Minutes of Exercise per Session: 0 min   Stress: No Stress Concern Present (6/10/2024)    [employer] - Occupational Stress Questionnaire     Feeling of Stress : Not at all   Housing Stability: Unknown (6/10/2024)    Housing Stability Vital Sign     Unable to Pay for Housing in the Last Year: Patient declined       Current Outpatient Medications on File Prior to Visit   Medication Sig Dispense Refill    amLODIPine (NORVASC) 5 MG tablet Take one tablet by mouth once a day. 90 tablet 0     ASPIRIN/SALICYLAMIDE/CAFFEINE (ARTHRITIS STRENGTH BC POWDER ORAL) Take 1 tablet by mouth once daily.      atorvastatin (LIPITOR) 80 MG tablet Take one tablet by mouth every pm. 90 tablet 0    cholecalciferol, vitamin D3, 1,250 mcg (50,000 unit) capsule Take 1 capsule (50,000 Units total) by mouth every 7 days. for 12 doses 12 capsule 0    metoprolol succinate (TOPROL-XL) 25 MG 24 hr tablet TAKE 1 TABLET DAILY 90 tablet 0    omeprazole (PRILOSEC) 20 MG capsule Take 20 mg by mouth once daily.       No current facility-administered medications on file prior to visit.       Family History   Problem Relation Name Age of Onset    Cancer Mother      Lung cancer Mother      Suicide Father         Review of Systems    Objective:      BP (!) 150/82   Pulse 85   Temp 98 °F (36.7 °C) (Oral)   Ht 5' (1.524 m)   Wt 49.1 kg (108 lb 3.9 oz)   SpO2 (!) 94%   BMI 21.14 kg/m²   Physical Exam    Assessment:       1. Familial hypercholesterolemia    2. Mass of left breast, unspecified quadrant    3. High triglycerides        Plan:       Familial hypercholesterolemia  -     icosapent ethyL (VASCEPA) 1 gram Cap; Take 2 capsules (2 g total) by mouth 2 (two) times daily.  Dispense: 60 capsule; Refill: 2  -     ezetimibe (ZETIA) 10 mg tablet; Take 1 tablet (10 mg total) by mouth once daily.  Dispense: 90 tablet; Refill: 3    Mass of left breast, unspecified quadrant    High triglycerides  -     icosapent ethyL (VASCEPA) 1 gram Cap; Take 2 capsules (2 g total) by mouth 2 (two) times daily.  Dispense: 60 capsule; Refill: 2  -     ezetimibe (ZETIA) 10 mg tablet; Take 1 tablet (10 mg total) by mouth once daily.  Dispense: 90 tablet; Refill: 3    Other orders  -     coenzyme Q10 200 mg capsule; Take 400 mg by mouth once daily.  Dispense: 90 capsule; Refill: 0        Counseled on regular exercise, maintenance of a healthy weight, balanced diet rich in fruits/vegetables and lean protein, and avoidance of unhealthy habits like smoking and  excessive alcohol intake.

## 2024-07-12 ENCOUNTER — TELEPHONE (OUTPATIENT)
Dept: RADIOLOGY | Facility: HOSPITAL | Age: 59
End: 2024-07-12
Payer: COMMERCIAL

## 2024-07-21 PROBLEM — N63.20 MASS OF LEFT BREAST: Status: ACTIVE | Noted: 2024-07-21

## 2024-07-21 PROBLEM — E78.1 HIGH TRIGLYCERIDES: Status: ACTIVE | Noted: 2024-07-21

## 2024-07-22 ENCOUNTER — HOSPITAL ENCOUNTER (OUTPATIENT)
Dept: RADIOLOGY | Facility: HOSPITAL | Age: 59
Discharge: HOME | End: 2024-07-22
Attending: STUDENT IN AN ORGANIZED HEALTH CARE EDUCATION/TRAINING PROGRAM
Payer: COMMERCIAL

## 2024-07-22 DIAGNOSIS — R92.8 ABNORMALITY OF LEFT BREAST ON SCREENING MAMMOGRAM: ICD-10-CM

## 2024-07-22 PROCEDURE — 77061 BREAST TOMOSYNTHESIS UNI: CPT | Mod: 26,LT,, | Performed by: RADIOLOGY

## 2024-07-22 PROCEDURE — 77065 DX MAMMO INCL CAD UNI: CPT | Mod: 26,LT,, | Performed by: RADIOLOGY

## 2024-07-22 PROCEDURE — 76642 ULTRASOUND BREAST LIMITED: CPT | Mod: 26,LT,, | Performed by: RADIOLOGY

## 2024-07-22 PROCEDURE — 77061 BREAST TOMOSYNTHESIS UNI: CPT | Mod: TC,PO,LT

## 2024-07-22 PROCEDURE — 76642 ULTRASOUND BREAST LIMITED: CPT | Mod: TC,PO,LT

## 2024-08-02 ENCOUNTER — HOSPITAL ENCOUNTER (OUTPATIENT)
Dept: RADIOLOGY | Facility: HOSPITAL | Age: 59
Discharge: HOME | End: 2024-08-02
Attending: STUDENT IN AN ORGANIZED HEALTH CARE EDUCATION/TRAINING PROGRAM
Payer: COMMERCIAL

## 2024-08-02 DIAGNOSIS — R92.8 ABNORMALITY OF LEFT BREAST ON SCREENING MAMMOGRAM: ICD-10-CM

## 2024-08-02 PROCEDURE — 19083 BX BREAST 1ST LESION US IMAG: CPT | Mod: LT,,, | Performed by: RADIOLOGY

## 2024-08-02 PROCEDURE — 77065 DX MAMMO INCL CAD UNI: CPT | Mod: 26,LT,, | Performed by: RADIOLOGY

## 2024-08-02 PROCEDURE — 88341 IMHCHEM/IMCYTCHM EA ADD ANTB: CPT | Mod: PO | Performed by: STUDENT IN AN ORGANIZED HEALTH CARE EDUCATION/TRAINING PROGRAM

## 2024-08-02 PROCEDURE — 25000003 PHARM REV CODE 250: Mod: PO | Performed by: STUDENT IN AN ORGANIZED HEALTH CARE EDUCATION/TRAINING PROGRAM

## 2024-08-02 PROCEDURE — 77065 DX MAMMO INCL CAD UNI: CPT | Mod: TC,PO,LT

## 2024-08-02 PROCEDURE — 88305 TISSUE EXAM BY PATHOLOGIST: CPT | Mod: PO | Performed by: STUDENT IN AN ORGANIZED HEALTH CARE EDUCATION/TRAINING PROGRAM

## 2024-08-02 PROCEDURE — 88342 IMHCHEM/IMCYTCHM 1ST ANTB: CPT | Mod: PO | Performed by: STUDENT IN AN ORGANIZED HEALTH CARE EDUCATION/TRAINING PROGRAM

## 2024-08-02 PROCEDURE — 19083 BX BREAST 1ST LESION US IMAG: CPT | Mod: PO,LT

## 2024-08-02 PROCEDURE — 27202663 US BREAST BIOPSY WITH IMAGING 1ST SITE LEFT: Mod: PO

## 2024-08-02 RX ORDER — LIDOCAINE HYDROCHLORIDE 10 MG/ML
5 INJECTION, SOLUTION INFILTRATION; PERINEURAL ONCE
Status: COMPLETED | OUTPATIENT
Start: 2024-08-02 | End: 2024-08-02

## 2024-08-02 RX ADMIN — LIDOCAINE HYDROCHLORIDE ANHYDROUS 5 ML: 10 INJECTION, SOLUTION INFILTRATION at 08:08

## 2024-08-06 ENCOUNTER — TELEPHONE (OUTPATIENT)
Dept: RADIOLOGY | Facility: HOSPITAL | Age: 59
End: 2024-08-06
Payer: COMMERCIAL

## 2024-08-06 LAB
COMMENT: NORMAL
FINAL PATHOLOGIC DIAGNOSIS: NORMAL
GROSS: NORMAL
Lab: NORMAL
MICROSCOPIC EXAM: NORMAL

## 2024-08-07 ENCOUNTER — PATIENT MESSAGE (OUTPATIENT)
Dept: FAMILY MEDICINE | Facility: CLINIC | Age: 59
End: 2024-08-07
Payer: COMMERCIAL

## 2024-08-14 ENCOUNTER — TELEPHONE (OUTPATIENT)
Dept: ALLERGY | Facility: CLINIC | Age: 59
End: 2024-08-14
Payer: COMMERCIAL

## 2024-08-14 DIAGNOSIS — E78.5 HYPERLIPIDEMIA, UNSPECIFIED HYPERLIPIDEMIA TYPE: ICD-10-CM

## 2024-08-14 DIAGNOSIS — I10 ESSENTIAL HYPERTENSION: ICD-10-CM

## 2024-08-14 RX ORDER — AMLODIPINE BESYLATE 5 MG/1
TABLET ORAL
Qty: 90 TABLET | Refills: 0 | Status: SHIPPED | OUTPATIENT
Start: 2024-08-14

## 2024-08-14 RX ORDER — ATORVASTATIN CALCIUM 80 MG/1
TABLET, FILM COATED ORAL
Qty: 90 TABLET | Refills: 0 | Status: SHIPPED | OUTPATIENT
Start: 2024-08-14

## 2024-08-14 RX ORDER — METOPROLOL SUCCINATE 25 MG/1
TABLET, EXTENDED RELEASE ORAL
Qty: 90 TABLET | Refills: 0 | Status: SHIPPED | OUTPATIENT
Start: 2024-08-14

## 2024-08-14 NOTE — TELEPHONE ENCOUNTER
----- Message from Baljinder Salazar sent at 8/14/2024 11:52 AM CDT -----  Regarding: refill  Contact: Pharmacist at [phone]  Type:  RX Refill Request    Who Called:  Pharmacist   Refill or New Rx:  refill    Additional Information:  pt is out of med and needs refills sent today. Pharm calling on behalf of pt b/c pt is on day 32 of 30.    RX Name and Strength:    1. atorvastatin (LIPITOR) 80 MG tablet 90 tablet 0 5/13/2024 -   Sig: Take one tablet by mouth every pm.   Sent to pharmacy as: atorvastatin (LIPITOR) 80 MG tablet   E-Prescribing Status: Receipt confirmed by pharmacy (5/13/2024 10:55 AM CDT)     2. amLODIPine (NORVASC) 5 MG tablet 90 tablet 0 5/13/2024 -   Sig: Take one tablet by mouth once a day.   Sent to pharmacy as: amLODIPine (NORVASC) 5 MG tablet   Notes to Pharmacy: .   E-Prescribing Status: Receipt confirmed by pharmacy (5/13/2024 10:55 AM CDT)     3. metoprolol succinate (TOPROL-XL) 25 MG 24 hr tablet 90 tablet 0 5/13/2024 -  Sig: TAKE 1 TABLET DAILY   Sent to pharmacy as: metoprolol succinate (TOPROL-XL) 25 MG 24 hr tablet   Notes to Pharmacy: .   E-Prescribing Status: Receipt confirmed by pharmacy (5/13/2024 10:55 AM CDT)     How is the patient currently taking it? (ex. 1XDay):  see above  Is this a 30 day or 90 day RX:  see above    Preferred Pharmacy with phone number:    [facility] - [place], [place] - [address] [facility]  [address] [address] [address]  Phone: [phone] Fax: [phone]    Local or Mail Order:  local    Ordering Provider:  Dr Ophelia Pearce Call Back Number:  [phone]

## 2024-08-29 ENCOUNTER — OFFICE VISIT (OUTPATIENT)
Dept: OTOLARYNGOLOGY | Facility: CLINIC | Age: 59
End: 2024-08-29
Payer: COMMERCIAL

## 2024-08-29 VITALS
HEIGHT: 60 IN | SYSTOLIC BLOOD PRESSURE: 140 MMHG | DIASTOLIC BLOOD PRESSURE: 79 MMHG | BODY MASS INDEX: 20.77 KG/M2 | HEART RATE: 80 BPM | WEIGHT: 105.81 LBS

## 2024-08-29 DIAGNOSIS — H61.23 BILATERAL IMPACTED CERUMEN: Primary | ICD-10-CM

## 2024-08-29 PROCEDURE — 3008F BODY MASS INDEX DOCD: CPT | Mod: CPTII,S$GLB,, | Performed by: PHYSICIAN ASSISTANT

## 2024-08-29 PROCEDURE — 1159F MED LIST DOCD IN RCRD: CPT | Mod: CPTII,S$GLB,, | Performed by: PHYSICIAN ASSISTANT

## 2024-08-29 PROCEDURE — 3078F DIAST BP <80 MM HG: CPT | Mod: CPTII,S$GLB,, | Performed by: PHYSICIAN ASSISTANT

## 2024-08-29 PROCEDURE — 3077F SYST BP >= 140 MM HG: CPT | Mod: CPTII,S$GLB,, | Performed by: PHYSICIAN ASSISTANT

## 2024-08-29 PROCEDURE — 99999 PR PBB SHADOW E&M-EST. PATIENT-LVL IV: CPT | Mod: PBBFAC,,, | Performed by: PHYSICIAN ASSISTANT

## 2024-08-29 PROCEDURE — 1160F RVW MEDS BY RX/DR IN RCRD: CPT | Mod: CPTII,S$GLB,, | Performed by: PHYSICIAN ASSISTANT

## 2024-08-29 PROCEDURE — 99213 OFFICE O/P EST LOW 20 MIN: CPT | Mod: 25,S$GLB,, | Performed by: PHYSICIAN ASSISTANT

## 2024-08-29 PROCEDURE — 69210 REMOVE IMPACTED EAR WAX UNI: CPT | Mod: S$GLB,,, | Performed by: PHYSICIAN ASSISTANT

## 2024-08-29 NOTE — PATIENT INSTRUCTIONS
M-W-F: Maintenance for ear wax or dry/itchy ear canals: Use mineral oil. Tilt head to the side (tilt head to right to apply in left ear and tilt head to left to apply in right ear) and place 3-4 drops in affected ear canal(s) and let sit for around 30 seconds to 1 minute and then bring head up and dab excess oil as it comes from ear canals with clean kleenex. Do this in the evening.

## 2024-08-29 NOTE — PROGRESS NOTES
[facility]    Subjective:      Patient: Dena Stanton Patient PCP: Tri Jacinto DO         :  1965     Sex:  female      MRN:  [id number]          Date of Visit: 2024      Chief Complaint: Cerumen Impaction (6mo f/u ear cleaning)    Interval History 2024: Pt presents to clinic for routine 6 month ear cleaning. Pt continues to wear bilateral hearing aids through [facility] for hearing loss. She denies ear pain/discharge.     Interval History 2024: Pt presents to office for routine 6 month ear cleaning. Pt continues to wear bilateral hearing aids through [facility] for hearing loss. Pt is not having ear pain/discharge or fever/chills.      Interval History 2023: Pt goes to [facility] for hearing aids. Pt has bilateral cerumen impaction and requires cleaning. No fever/chills or ear pain/discharge.     Interval History 2022: Pt had bilateral cerumen removal at last visit and had right sided hemotympanum. Pt states that she has decreased her use of BC goody powder. Pt states that her right ear has been doing fine. Pt denies fever/chills, ear pain/discharge or further hearing loss.    Patient ID 2022: Dena Stanton is a 58 y.o. female who was self-referred for  cerumen impaction/neck cyst . Pt states that she has noticed wax in ears. Pt has history of hearing loss and has hearing aid through outside dispenser. Pt has had left neck cyst for the past year, which she states has been stable in size, but has recently enlarged in the last few weeks. Pt denies purulent drainage or signs of infection from neck cyst. Pt denies fever/chills, ear pain or discharge. Pt denies fever/chills, drenching night sweats or unintentional weight loss.     Past Medical History  She has a past medical history of Coronary artery disease, GERD (gastroesophageal reflux disease), Hearing impaired person, bilateral, Hyperlipidemia, Hypertension, and Myocardial infarction.    Family History  Her family  history includes Cancer in her mother; Lung cancer in her mother; Suicide in her father.    Past Surgical History:   Procedure Laterality Date     SECTION      TYMPANOPLASTY       Social History     Tobacco Use    Smoking status: Every Day     Current packs/day: 0.50     Average packs/day: 0.5 packs/day for 41.7 years (20.9 ttl pk-yrs)     Types: Cigarettes     Start date: 1982    Smokeless tobacco: Never   Substance and Sexual Activity    Alcohol use: Yes     Alcohol/week: 3.0 standard drinks of alcohol     Types: 3 Glasses of wine per week     Comment: pt states about 3 glasses of wine per week    Drug use: No    Sexual activity: Yes     Partners: Male     Comment: long term     Medications  She has a current medication list which includes the following prescription(s): amlodipine, aspirin/salicylamide/caffeine, atorvastatin, coenzyme q10, ezetimibe, icosapent ethyl, metoprolol succinate, and omeprazole.    Review of patient's allergies indicates:   Allergen Reactions    Chantix [varenicline] Other (See Comments)     Mood changes     All medications, allergies, and past history have been reviewed.    Objective:      Vitals:      6/10/2024     2:27 PM 2024    10:06 AM 2024     1:37 PM   Vitals - 1 value per visit   SYSTOLIC 138 150 151   DIASTOLIC 78 82 87   Pulse 82 85 86   Temp  98 °F (36.7 °C)    SPO2 97 % 94 %    Weight (lb) 110.12 108.25 105.82   Weight (kg) 49.95 49.1 48   Height 5' (1.524 m) 5' (1.524 m) 5' (1.524 m)   BMI (Calculated) 21.5 21.1 20.7   Pain Score Zero Zero Zero       Body surface area is 1.43 meters squared.    Physical Exam  Constitutional:       General: She is not in acute distress.     Appearance: Normal appearance. She is not ill-appearing.   HENT:      Head: Normocephalic and atraumatic.      Right Ear: Ear canal and external ear normal. There is impacted cerumen. Tympanic membrane is scarred (with monomeric changes).      Left Ear: Ear canal and external ear  normal. There is impacted cerumen. Tympanic membrane is scarred (with monomeric changes).      Nose: Nose normal.      Mouth/Throat:      Lips: Pink. No lesions.      Mouth: Mucous membranes are moist. No oral lesions.      Tongue: No lesions.      Palate: No lesions.      Pharynx: Oropharynx is clear. Uvula midline. No pharyngeal swelling, oropharyngeal exudate, posterior oropharyngeal erythema or uvula swelling.   Eyes:      General:         Right eye: No discharge.         Left eye: No discharge.      Extraocular Movements: Extraocular movements intact.      Conjunctiva/sclera: Conjunctivae normal.   Pulmonary:      Effort: Pulmonary effort is normal.   Neurological:      General: No focal deficit present.      Mental Status: She is alert and oriented to person, place, and time. Mental status is at baseline.   Psychiatric:         Mood and Affect: Mood normal.         Behavior: Behavior normal.         Thought Content: Thought content normal.         Judgment: Judgment normal.       Ear Cerumen Removal     Date/Time: 8/29/2024 1:30 PM     Performed by: Jeffrey Matute PA-C  Authorized by: Jeffrey Matute PA-C       Local anesthetic:  None  Location details:  Both ears  Procedure type: curette    Cerumen  Removal Results:  Cerumen completely removed  Patient tolerance:  Patient tolerated the procedure well with no immediate complications       Labs:  WBC   Date Value Ref Range Status   07/02/2024 6.58 3.90 - 12.70 K/uL Final     Platelets   Date Value Ref Range Status   07/02/2024 256 150 - 450 K/uL Final     Creatinine   Date Value Ref Range Status   07/02/2024 0.7 0.5 - 1.4 mg/dL Final     TSH   Date Value Ref Range Status   05/02/2024 2.897 0.400 - 4.000 uIU/mL Final     Glucose   Date Value Ref Range Status   07/02/2024 133 (H) 70 - 110 mg/dL Final     All lab results and imaging results have been reviewed.    Assessment:       ICD-10-CM ICD-9-CM   1. Bilateral impacted cerumen  H61.23 380.4       Plan:      Bilateral impacted cerumen  -     Ear Cerumen Removal: Bilateral ear cleaning performed today in office. Mineral oil instructions for cerumen provided via AVS.    Follow up in 6 months for routine ear cleaning. Pt may follow up sooner if having ENT issues/concerns.

## 2024-09-02 NOTE — PROCEDURES
Ear Cerumen Removal    Date/Time: 8/29/2024 1:30 PM    Performed by: Jeffrey Matute PA-C  Authorized by: Jeffrey Matute PA-C      Local anesthetic:  None  Location details:  Both ears  Procedure type: curette    Cerumen  Removal Results:  Cerumen completely removed  Patient tolerance:  Patient tolerated the procedure well with no immediate complications

## 2024-09-23 ENCOUNTER — HOSPITAL ENCOUNTER (OUTPATIENT)
Facility: HOSPITAL | Age: 59
Discharge: HOME | End: 2024-09-23
Attending: INTERNAL MEDICINE | Admitting: INTERNAL MEDICINE
Payer: COMMERCIAL

## 2024-09-23 ENCOUNTER — ANESTHESIA (OUTPATIENT)
Dept: ENDOSCOPY | Facility: HOSPITAL | Age: 59
End: 2024-09-23
Payer: COMMERCIAL

## 2024-09-23 ENCOUNTER — ANESTHESIA EVENT (OUTPATIENT)
Dept: ENDOSCOPY | Facility: HOSPITAL | Age: 59
End: 2024-09-23
Payer: COMMERCIAL

## 2024-09-23 DIAGNOSIS — Z12.11 SCREEN FOR COLON CANCER: ICD-10-CM

## 2024-09-23 DIAGNOSIS — K63.5 POLYP OF COLON, UNSPECIFIED PART OF COLON, UNSPECIFIED TYPE: Primary | ICD-10-CM

## 2024-09-23 DIAGNOSIS — K57.90 DIVERTICULOSIS: ICD-10-CM

## 2024-09-23 DIAGNOSIS — K64.8 INTERNAL HEMORRHOIDS: ICD-10-CM

## 2024-09-23 PROCEDURE — 37000008 HC ANESTHESIA 1ST 15 MINUTES: Performed by: INTERNAL MEDICINE

## 2024-09-23 PROCEDURE — 37000009 HC ANESTHESIA EA ADD 15 MINS: Performed by: INTERNAL MEDICINE

## 2024-09-23 PROCEDURE — 25000003 PHARM REV CODE 250: Performed by: INTERNAL MEDICINE

## 2024-09-23 PROCEDURE — 27201012 HC FORCEPS, HOT/COLD, DISP: Performed by: INTERNAL MEDICINE

## 2024-09-23 PROCEDURE — 27201089 HC SNARE, DISP (ANY): Performed by: INTERNAL MEDICINE

## 2024-09-23 PROCEDURE — 25000003 PHARM REV CODE 250: Performed by: NURSE ANESTHETIST, CERTIFIED REGISTERED

## 2024-09-23 PROCEDURE — 45385 COLONOSCOPY W/LESION REMOVAL: CPT | Mod: 33,,, | Performed by: INTERNAL MEDICINE

## 2024-09-23 PROCEDURE — 45385 COLONOSCOPY W/LESION REMOVAL: CPT | Mod: 33 | Performed by: INTERNAL MEDICINE

## 2024-09-23 PROCEDURE — 45380 COLONOSCOPY AND BIOPSY: CPT | Mod: 33,59,, | Performed by: INTERNAL MEDICINE

## 2024-09-23 PROCEDURE — 45380 COLONOSCOPY AND BIOPSY: CPT | Mod: 33,59 | Performed by: INTERNAL MEDICINE

## 2024-09-23 RX ORDER — PROPOFOL 10 MG/ML
VIAL (ML) INTRAVENOUS
Status: DISCONTINUED | OUTPATIENT
Start: 2024-09-23 | End: 2024-09-23

## 2024-09-23 RX ORDER — SODIUM CHLORIDE 9 MG/ML
INJECTION, SOLUTION INTRAVENOUS CONTINUOUS
Status: DISCONTINUED | OUTPATIENT
Start: 2024-09-23 | End: 2024-09-23 | Stop reason: HOSPADM

## 2024-09-23 RX ORDER — LIDOCAINE HYDROCHLORIDE 20 MG/ML
INJECTION INTRAVENOUS
Status: DISCONTINUED | OUTPATIENT
Start: 2024-09-23 | End: 2024-09-23

## 2024-09-23 RX ADMIN — SODIUM CHLORIDE: 9 INJECTION, SOLUTION INTRAVENOUS at 09:09

## 2024-09-23 RX ADMIN — LIDOCAINE HYDROCHLORIDE 75 MG: 20 INJECTION, SOLUTION INTRAVENOUS at 10:09

## 2024-09-23 RX ADMIN — Medication 40 MG: at 10:09

## 2024-09-23 RX ADMIN — Medication 80 MG: at 10:09

## 2024-09-23 NOTE — TRANSFER OF CARE
Anesthesia Transfer of Care Note    Patient: Dena Stanton    Procedure(s) Performed: Procedure(s) (LRB):  COLONOSCOPY (N/A)    Patient location: PACU    Anesthesia Type: general    Transport from OR: Transported from OR on 2-3 L/min O2 by NC with adequate spontaneous ventilation    Post pain: adequate analgesia    Post assessment: no apparent anesthetic complications and tolerated procedure well    Post vital signs: stable    Level of consciousness: sedated and responds to stimulation    Nausea/Vomiting: no nausea/vomiting    Complications: none    Transfer of care protocol was followed      Last vitals: Visit Vitals  BP (!) 141/90 (BP Location: Left arm, Patient Position: Lying)   Pulse 87   Temp 36.8 °C (98.2 °F) (Skin)   Resp 16   SpO2 96%   Breastfeeding No

## 2024-09-23 NOTE — PLAN OF CARE
Dr. Snell to bedside to speak to patient and their spouse about procedure and the findings.  All questions answered.

## 2024-09-23 NOTE — [employer] PATIENT INSTRUCTIONS
Discharge Summary/Instructions after an Endoscopic Procedure  Patient Name: Dena Stanton  Patient MRN: [id number]  Patient YOB: 1965 Monday, September 23, 2024  Tereso Elizabeth MD  Dear patient,  As a result of recent federal legislation (The Federal Cures Act), you may   receive lab or pathology results from your procedure in your [facility]   account before your physician is able to contact you. Your physician or   their representative will relay the results to you with their   recommendations at their soonest availability.  Thank you,  RESTRICTIONS:  During your procedure today, you received medications for sedation.  These   medications may affect your judgment, balance and coordination.  Therefore,   for 24 hours, you have the following restrictions:   - DO NOT drive a car, operate machinery, make legal/financial decisions,   sign important papers or drink alcohol.    ACTIVITY:  Today: no heavy lifting, straining or running due to procedural   sedation/anesthesia.  The following day: return to full activity including work.  DIET:  Eat and drink normally unless instructed otherwise.     TREATMENT FOR COMMON SIDE EFFECTS:  - Mild abdominal pain, nausea, belching, bloating or excessive gas:  rest,   eat lightly and use a heating pad.  - Sore Throat: treat with throat lozenges and/or gargle with warm salt   water.  - Because air was used during the procedure, expelling large amounts of air   from your rectum or belching is normal.  - If a bowel prep was taken, you may not have a bowel movement for 1-3 days.    This is normal.  SYMPTOMS TO WATCH FOR AND REPORT TO YOUR PHYSICIAN:  1. Abdominal pain or bloating, other than gas cramps.  2. Chest pain.  3. Back pain.  4. Signs of infection such as: chills or fever occurring within 24 hours   after the procedure.  5. Rectal bleeding, which would show as bright red, maroon, or black stools.   (A tablespoon of blood from the rectum is not serious, especially  if   hemorrhoids are present.)  6. Vomiting.  7. Weakness or dizziness.  GO DIRECTLY TO THE NEAREST EMERGENCY ROOM IF YOU HAVE ANY OF THE FOLLOWING:      Difficulty breathing              Chills and/or fever over 101 F   Persistent vomiting and/or vomiting blood   Severe abdominal pain   Severe chest pain   Black, tarry stools   Bleeding- more than one tablespoon   Any other symptom or condition that you feel may need urgent attention  Your doctor recommends these additional instructions:  If any biopsies were taken, your doctors clinic will contact you in 1 to 2   weeks with any results.  - Patient has a contact number available for emergencies.  The signs and   symptoms of potential delayed complications were discussed with the   patient.  Return to normal activities tomorrow.  Written discharge   instructions were provided to the patient.   - High fiber diet.   - Continue present medications.   - Await pathology results.   - Repeat colonoscopy in 5 years for surveillance.   - Discharge patient to home (ambulatory).   - Return to GI office PRN.  For questions, problems or results please call your physician - Tereso Elizabeth MD at Work:  [phone].  [facility] PHONE NUMBER: [phone]  IF A COMPLICATION OR EMERGENCY SITUATION ARISES AND YOU ARE UNABLE TO REACH   YOUR PHYSICIAN - GO DIRECTLY TO THE EMERGENCY ROOM.  Tereso Elizabeth MD  9/23/2024 10:26:54 AM  This report has been verified and signed electronically.  Dear patient,  As a result of recent federal legislation (The Federal Cures Act), you may   receive lab or pathology results from your procedure in your [facility]   account before your physician is able to contact you. Your physician or   their representative will relay the results to you with their   recommendations at their soonest availability.  Thank you,  [employer]ATION

## 2024-09-23 NOTE — H&P
CC: Screening for colorectal cancer - first occurrence    58 year old female with above. States that symptoms are absent, no alleviating/exacerbating factors. No family history of colorectal CA. No personal history of polyps. No bleeding or weight loss.     ROS:  No headache, no fever/chills, no chest pain/SOB, no nausea/vomiting/diarrhea/constipation/GI bleeding/abdominal pain, no dysuria/hematuria.    VSSAF   Exam:   Alert and oriented x 3; no apparent distress   PERRLA, sclera anicteric  CV: Regular rate/rhythm, normal PMI   Lungs: Clear bilaterally with no wheeze/rales   Abdomen: Soft, NT/ND, normal bowel sounds   Ext: No cyanosis, clubbing     Impression:   As above    Plan:   Proceed with endoscopy. Further recs to follow.

## 2024-09-23 NOTE — ANESTHESIA PREPROCEDURE EVALUATION
09/23/2024  Dena Stanton is a 58 y.o., female.      Pre-op Assessment    I have reviewed the NPO Status.   I have reviewed the Medications.     Review of Systems  Anesthesia Hx:  No problems with previous Anesthesia                Cardiovascular:  Exercise tolerance: good   Hypertension  Past MI CAD           hyperlipidemia       Coronary Artery Disease:          Hx of Myocardial Infarction                  Hypertension         Pulmonary:  Pulmonary Normal                       Hepatic/GI:  Bowel Prep.   GERD      Gerd              Physical Exam  General: Well nourished    Airway:  Mallampati: II   Mouth Opening: Normal  TM Distance: Normal  Tongue: Normal  Neck ROM: Normal ROM    Dental:  Intact    Chest/Lungs:  Clear to auscultation, Normal Respiratory Rate        Anesthesia Plan  Type of Anesthesia, risks & benefits discussed:    Anesthesia Type: Gen Natural Airway  Intra-op Monitoring Plan: Standard ASA Monitors  Induction:  IV  Informed Consent: Informed consent signed with the Patient and all parties understand the risks and agree with anesthesia plan.  All questions answered.   ASA Score: 3    Ready For Surgery From Anesthesia Perspective.     .

## 2024-09-23 NOTE — ANESTHESIA POSTPROCEDURE EVALUATION
Anesthesia Post Evaluation    Patient: Dena Stanton    Procedure(s) Performed: Procedure(s) (LRB):  COLONOSCOPY (N/A)    Final Anesthesia Type: general      Patient location during evaluation: PACU  Patient participation: Yes- Able to Participate  Level of consciousness: awake and alert and oriented  Post-procedure vital signs: reviewed and stable  Pain management: adequate  Airway patency: patent    PONV status at discharge: No PONV  Anesthetic complications: no      Cardiovascular status: blood pressure returned to baseline  Respiratory status: unassisted, spontaneous ventilation and room air  Hydration status: euvolemic  Follow-up not needed.              Vitals Value Taken Time   /79 09/23/24 1100   Temp 36.7 °C (98.1 °F) 09/23/24 1030   Pulse 80 09/23/24 1101   Resp 16 09/23/24 1100   SpO2 97 % 09/23/24 1101   Vitals shown include unfiled device data.      No case tracking events are documented in the log.      Pain/Daylin Score: Daylin Score: 10 (9/23/2024 11:00 AM)

## 2024-09-23 NOTE — PLAN OF CARE
1030 Received patient from procedure room.  Report received, placed patient on monitors, VSS.  Will continue to monitor patient per protocol until discharge.    1035 patient tolerating po fluids

## 2024-09-23 NOTE — PLAN OF CARE
Discharge instructions given to patient, she verbalized understanding, and voiced no questions or concerns.

## 2024-09-24 VITALS
RESPIRATION RATE: 16 BRPM | TEMPERATURE: 98 F | DIASTOLIC BLOOD PRESSURE: 79 MMHG | HEART RATE: 80 BPM | OXYGEN SATURATION: 97 % | SYSTOLIC BLOOD PRESSURE: 134 MMHG

## 2024-09-25 NOTE — PROGRESS NOTES
Please advise patient that polyp pathology was reviewed and is benign and is the adenomatous/sessile serrated type which is precancerous/risk factor for cancer.  Repeat colonoscopy recommended in 5 years.  Place reminder in system for repeat colonoscopy.

## 2024-10-11 ENCOUNTER — OFFICE VISIT (OUTPATIENT)
Dept: FAMILY MEDICINE | Facility: CLINIC | Age: 59
End: 2024-10-11
Payer: COMMERCIAL

## 2024-10-11 ENCOUNTER — LAB VISIT (OUTPATIENT)
Dept: LAB | Facility: HOSPITAL | Age: 59
End: 2024-10-11
Attending: STUDENT IN AN ORGANIZED HEALTH CARE EDUCATION/TRAINING PROGRAM
Payer: COMMERCIAL

## 2024-10-11 VITALS
DIASTOLIC BLOOD PRESSURE: 82 MMHG | SYSTOLIC BLOOD PRESSURE: 126 MMHG | BODY MASS INDEX: 20.91 KG/M2 | OXYGEN SATURATION: 96 % | HEIGHT: 60 IN | WEIGHT: 106.5 LBS | HEART RATE: 91 BPM

## 2024-10-11 DIAGNOSIS — I49.9 IRREGULAR HEART RHYTHM: ICD-10-CM

## 2024-10-11 DIAGNOSIS — Z12.4 CERVICAL CANCER SCREENING: ICD-10-CM

## 2024-10-11 DIAGNOSIS — I25.2 MYOCARDIAL INFARCT, OLD: Primary | ICD-10-CM

## 2024-10-11 DIAGNOSIS — E55.9 VITAMIN D DEFICIENCY: ICD-10-CM

## 2024-10-11 DIAGNOSIS — M81.8 OTHER OSTEOPOROSIS WITHOUT CURRENT PATHOLOGICAL FRACTURE: ICD-10-CM

## 2024-10-11 DIAGNOSIS — N63.20 MASS OF LEFT BREAST, UNSPECIFIED QUADRANT: ICD-10-CM

## 2024-10-11 DIAGNOSIS — K57.90 DIVERTICULOSIS: ICD-10-CM

## 2024-10-11 DIAGNOSIS — Z97.4 HEARING AID WORN: ICD-10-CM

## 2024-10-11 DIAGNOSIS — Z86.0101 PERSONAL HISTORY OF ADENOMATOUS AND SERRATED COLON POLYPS: ICD-10-CM

## 2024-10-11 DIAGNOSIS — R91.8 MULTIPLE LUNG NODULES ON CT: ICD-10-CM

## 2024-10-11 DIAGNOSIS — F17.210 TOBACCO DEPENDENCE DUE TO CIGARETTES: Primary | ICD-10-CM

## 2024-10-11 DIAGNOSIS — E78.01 FAMILIAL HYPERCHOLESTEROLEMIA: ICD-10-CM

## 2024-10-11 DIAGNOSIS — I10 PRIMARY HYPERTENSION: ICD-10-CM

## 2024-10-11 LAB
CHOLEST SERPL-MCNC: 299 MG/DL (ref 120–199)
CHOLEST/HDLC SERPL: 6.5 {RATIO} (ref 2–5)
HDLC SERPL-MCNC: 46 MG/DL (ref 40–75)
HDLC SERPL: 15.4 % (ref 20–50)
LDLC SERPL CALC-MCNC: 210 MG/DL (ref 63–159)
MAGNESIUM SERPL-MCNC: 1.3 MG/DL (ref 1.6–2.6)
NONHDLC SERPL-MCNC: 253 MG/DL
OHS QRS DURATION: 78 MS
OHS QTC CALCULATION: 424 MS
PHOSPHATE SERPL-MCNC: 3.8 MG/DL (ref 2.7–4.5)
PTH-INTACT SERPL-MCNC: 52.6 PG/ML (ref 9–77)
TRIGL SERPL-MCNC: 215 MG/DL (ref 30–150)

## 2024-10-11 PROCEDURE — 36415 COLL VENOUS BLD VENIPUNCTURE: CPT | Mod: PO | Performed by: STUDENT IN AN ORGANIZED HEALTH CARE EDUCATION/TRAINING PROGRAM

## 2024-10-11 PROCEDURE — 80061 LIPID PANEL: CPT | Performed by: STUDENT IN AN ORGANIZED HEALTH CARE EDUCATION/TRAINING PROGRAM

## 2024-10-11 PROCEDURE — 83735 ASSAY OF MAGNESIUM: CPT | Performed by: STUDENT IN AN ORGANIZED HEALTH CARE EDUCATION/TRAINING PROGRAM

## 2024-10-11 PROCEDURE — 93005 ELECTROCARDIOGRAM TRACING: CPT | Mod: S$GLB,,, | Performed by: STUDENT IN AN ORGANIZED HEALTH CARE EDUCATION/TRAINING PROGRAM

## 2024-10-11 PROCEDURE — 93010 ELECTROCARDIOGRAM REPORT: CPT | Mod: S$GLB,,, | Performed by: INTERNAL MEDICINE

## 2024-10-11 PROCEDURE — 84100 ASSAY OF PHOSPHORUS: CPT | Performed by: STUDENT IN AN ORGANIZED HEALTH CARE EDUCATION/TRAINING PROGRAM

## 2024-10-11 PROCEDURE — 99999 PR PBB SHADOW E&M-EST. PATIENT-LVL V: CPT | Mod: PBBFAC,,, | Performed by: STUDENT IN AN ORGANIZED HEALTH CARE EDUCATION/TRAINING PROGRAM

## 2024-10-11 PROCEDURE — 83970 ASSAY OF PARATHORMONE: CPT | Performed by: STUDENT IN AN ORGANIZED HEALTH CARE EDUCATION/TRAINING PROGRAM

## 2024-10-11 RX ORDER — MULTIVITAMIN
1 TABLET ORAL DAILY
COMMUNITY
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
## 2024-10-18 DIAGNOSIS — I25.2 MYOCARDIAL INFARCT, OLD: Primary | ICD-10-CM

## 2024-10-18 DIAGNOSIS — E78.01 FAMILIAL HYPERCHOLESTEROLEMIA: ICD-10-CM

## 2024-11-07 ENCOUNTER — OFFICE VISIT (OUTPATIENT)
Dept: OBSTETRICS AND GYNECOLOGY | Facility: CLINIC | Age: 59
End: 2024-11-07
Payer: COMMERCIAL

## 2024-11-07 VITALS
DIASTOLIC BLOOD PRESSURE: 82 MMHG | HEIGHT: 60 IN | RESPIRATION RATE: 16 BRPM | BODY MASS INDEX: 20.8 KG/M2 | WEIGHT: 105.94 LBS | SYSTOLIC BLOOD PRESSURE: 106 MMHG

## 2024-11-07 DIAGNOSIS — Z12.4 SCREENING FOR CERVICAL CANCER: ICD-10-CM

## 2024-11-07 DIAGNOSIS — N95.2 VAGINAL ATROPHY: ICD-10-CM

## 2024-11-07 DIAGNOSIS — Z01.419 WELL WOMAN EXAM: Primary | ICD-10-CM

## 2024-11-07 PROCEDURE — 99999 PR PBB SHADOW E&M-EST. PATIENT-LVL III: CPT | Mod: PBBFAC,,, | Performed by: GENERAL PRACTICE

## 2024-11-07 RX ORDER — ESTRADIOL 0.1 MG/G
1 CREAM VAGINAL NIGHTLY
Qty: 15 G | Refills: 0 | Status: SHIPPED | OUTPATIENT
Start: 2024-11-07 | End: 2024-11-21

## 2024-11-07 RX ORDER — ESTRADIOL 0.1 MG/G
1 CREAM VAGINAL
Qty: 90 G | Refills: 3 | Status: SHIPPED | OUTPATIENT
Start: 2024-11-07

## 2024-11-07 NOTE — PATIENT INSTRUCTIONS
"PAP SMEARS:    Screening for cervical cancer involves 1 or 2 parts based on your age and situation:  PAP smear (looking at the cells from your cervix)  HPV testing (checking whether you have the Human Papilloma Virus in your cervical cells)    These test results often come back at different times, but you won't hear from me until they BOTH are back since both pieces of information are important in deciding what to do next.    Soif you see a PAP result in [facility] (or an HPV result) that is abnormal, please allow another 7-10 business days before you send a message asking what to do next.  I am waiting for the other test result before I make a recommendation.    If both tests are resulted in MyChart, you should hear from me within 2-3 business days.    Abnormal tests will be followed up in one of two ways:  Repeat testing of PAP and/or HPV  Colposcopy (examination and biopsy of your cervix in the office using staining solutions and magnification)       VAGINAL ESTROGEN USE: (cream only)    Using the provided applicator, you'll insert the estrogen cream inside the vagina.  You'll also rub a small amount into the external genitalia (the area included in the blue oval below).  You'll do this every night for two weeks for a "burst" of estrogen to get things going.    After the first two weeks, you'll apply the estrogen cream (inside and out) two nights per week to maintain the effect.       "

## 2024-11-07 NOTE — PROGRESS NOTES
HISTORY OF THE PRESENT ILLNESS    2024  Dena Stanton is a 58 y.o. here for WWE.  Last GYN exam ~15yrs ago.    G'sP's:   LMP: ~  Relationship: BF for 20yrs and last sex 5yrs ago (her GM was living with them) though would like to resume activity  PAP Hx: no h/o abnormals  MMG (2024) = had biopsy, was benign  HRT: never used   BLEEDING: denies     ASSESSMENT AND PLAN:  58 y.o. for [employer].  Has been 15yrs.  Symptomatic vulvovaginal atrophy.    Rx: vaginal estrogen  Cervical Cancer screening: f/u results of PAP / HPV --> if both negative then next screen in 3 yrs  Mammogram: up to date  Encouraged self breast awareness; RTC for breast concerns  Encouraged smoking cessation  Return to clinic: for periodic GYN wellness exam, every 1-3 years per patient preference and comfort level      GYNECOLOGIC HISTORY  Cervical CA Screen / Infectious   PAP Hx: no h/o abnormals  Genital HSV: no  Other STD Hx: no past history   Bleeding   Menarche: 15 yoa   Pain   Non-menstrual pelvic pressure/pain: No  Dyspareunia: No   Other   Vasomotor Sxs: denies  Vaginal dryness: yes     OBSTETRIC HISTORY  Living children: 2  C-sections: 2  IAB: 1    PAST MEDICAL HISTORY  -------------------------------------    Coronary artery disease    GERD (gastroesophageal reflux disease)    Hearing impaired person, bilateral    Hyperlipidemia    Hypertension    Myocardial infarction     PAST SURGICAL HISTORY  ----------------------------     section    Colonoscopy    Procedure: COLONOSCOPY;  Surgeon: Tereso Snell MD;  Location: [facility];  Service: Endoscopy;  Laterality: N/A;  ppm    Tympanoplasty     ALLERGIES  Review of patient's allergies indicates:   Allergen Reactions    Chantix [varenicline] Other (See Comments)     Mood changes     MEDICATIONS  Current Outpatient Medications   Medication Instructions    amLODIPine (NORVASC) 5 MG tablet Take one tablet by mouth once a day.    ASPIRIN/SALICYLAMIDE/CAFFEINE  "(ARTHRITIS STRENGTH BC POWDER ORAL) 1 tablet, Daily    atorvastatin (LIPITOR) 80 MG tablet Take one tablet by mouth every pm.    coenzyme Q10 400 mg, Oral, Daily    evolocumab (REPATHA SURECLICK) 140 mg, Subcutaneous, Every 14 days    ezetimibe (ZETIA) 10 mg, Oral, Daily    icosapent ethyL (VASCEPA) 2 g, Oral, 2 times daily    metoprolol succinate (TOPROL-XL) 25 MG 24 hr tablet TAKE 1 TABLET DAILY    multivitamin (THERAGRAN) per tablet 1 tablet, Daily    omeprazole (PRILOSEC) 20 mg, Daily     SOCIAL HISTORY  Lives with: partner and adult child  Smoker: smoker x 15yrs (had quit for 10yrs prior)  Alcohol: yes, socially  Drugs: denies  Domestic Violence: no  Occupation:  [employer]    FAMILY HISTORY  BLEEDING or  CLOTTING DISORDERS: none  BREAST CA: none  UTERINE CA: none  OVARIAN CA: none  COLON CA: none  --------------------------------------------------------------------------------------------------------------    PHYSICAL EXAM  Vitals:    11/07/24 0928   BP: 106/82   Resp: 16     GEN = alert/oriented, nad, pleasant  HEENT = sclera anicteric, EOM grossly normal  BREASTS = declined, no concerns  CV = BP and HR as per vitals  PULM = normal respiratory effort   =      External: nefg, moderately atrophic     Vagina: severely atrophied and without lesions and urethral meatus normal, narrow caliber     Discharge: scant     Cervix: normal-appearing and PAP smear obtained     Bimanual: uterus normal size and nontender, no adnexal masses or tenderness    LABS & RADS   Lab Results   Component Value Date    WBC 6.58 07/02/2024    HGB 13.7 07/02/2024    HCT 41.6 07/02/2024     07/02/2024    MCV 87 07/02/2024      Lab Results   Component Value Date    TSH 2.897 05/02/2024      No results found for: "LABURIN"  Lab Results   Component Value Date    HEPCAB Negative 05/02/2024    HEPBSAG Negative 05/02/2024          Valorie Chau MD    "

## 2024-11-26 ENCOUNTER — PATIENT MESSAGE (OUTPATIENT)
Dept: FAMILY MEDICINE | Facility: CLINIC | Age: 59
End: 2024-11-26
Payer: COMMERCIAL

## 2024-11-26 DIAGNOSIS — E78.5 HYPERLIPIDEMIA, UNSPECIFIED HYPERLIPIDEMIA TYPE: ICD-10-CM

## 2024-11-26 DIAGNOSIS — I10 ESSENTIAL HYPERTENSION: ICD-10-CM

## 2024-11-26 RX ORDER — ATORVASTATIN CALCIUM 80 MG/1
TABLET, FILM COATED ORAL
Qty: 90 TABLET | Refills: 0 | Status: SHIPPED | OUTPATIENT
Start: 2024-11-26

## 2024-11-26 RX ORDER — AMLODIPINE BESYLATE 5 MG/1
TABLET ORAL
Qty: 90 TABLET | Refills: 0 | Status: SHIPPED | OUTPATIENT
Start: 2024-11-26

## 2024-11-26 RX ORDER — METOPROLOL SUCCINATE 25 MG/1
TABLET, EXTENDED RELEASE ORAL
Qty: 90 TABLET | Refills: 0 | Status: SHIPPED | OUTPATIENT
Start: 2024-11-26

## 2024-12-02 ENCOUNTER — OFFICE VISIT (OUTPATIENT)
Dept: FAMILY MEDICINE | Facility: CLINIC | Age: 59
End: 2024-12-02
Payer: COMMERCIAL

## 2024-12-02 ENCOUNTER — TELEPHONE (OUTPATIENT)
Dept: FAMILY MEDICINE | Facility: CLINIC | Age: 59
End: 2024-12-02
Payer: COMMERCIAL

## 2024-12-02 VITALS
HEART RATE: 82 BPM | BODY MASS INDEX: 21.08 KG/M2 | SYSTOLIC BLOOD PRESSURE: 108 MMHG | WEIGHT: 107.38 LBS | DIASTOLIC BLOOD PRESSURE: 70 MMHG | HEIGHT: 60 IN | OXYGEN SATURATION: 95 %

## 2024-12-02 DIAGNOSIS — I25.2 MYOCARDIAL INFARCT, OLD: ICD-10-CM

## 2024-12-02 DIAGNOSIS — I10 PRIMARY HYPERTENSION: Primary | ICD-10-CM

## 2024-12-02 DIAGNOSIS — M81.8 OTHER OSTEOPOROSIS WITHOUT CURRENT PATHOLOGICAL FRACTURE: ICD-10-CM

## 2024-12-02 DIAGNOSIS — E78.01 FAMILIAL HYPERCHOLESTEROLEMIA: ICD-10-CM

## 2024-12-02 DIAGNOSIS — F17.210 TOBACCO DEPENDENCE DUE TO CIGARETTES: ICD-10-CM

## 2024-12-02 PROCEDURE — 1159F MED LIST DOCD IN RCRD: CPT | Mod: CPTII,S$GLB,, | Performed by: STUDENT IN AN ORGANIZED HEALTH CARE EDUCATION/TRAINING PROGRAM

## 2024-12-02 PROCEDURE — 99214 OFFICE O/P EST MOD 30 MIN: CPT | Mod: S$GLB,,, | Performed by: STUDENT IN AN ORGANIZED HEALTH CARE EDUCATION/TRAINING PROGRAM

## 2024-12-02 PROCEDURE — 3008F BODY MASS INDEX DOCD: CPT | Mod: CPTII,S$GLB,, | Performed by: STUDENT IN AN ORGANIZED HEALTH CARE EDUCATION/TRAINING PROGRAM

## 2024-12-02 PROCEDURE — 3074F SYST BP LT 130 MM HG: CPT | Mod: CPTII,S$GLB,, | Performed by: STUDENT IN AN ORGANIZED HEALTH CARE EDUCATION/TRAINING PROGRAM

## 2024-12-02 PROCEDURE — 99999 PR PBB SHADOW E&M-EST. PATIENT-LVL IV: CPT | Mod: PBBFAC,,, | Performed by: STUDENT IN AN ORGANIZED HEALTH CARE EDUCATION/TRAINING PROGRAM

## 2024-12-02 PROCEDURE — 3078F DIAST BP <80 MM HG: CPT | Mod: CPTII,S$GLB,, | Performed by: STUDENT IN AN ORGANIZED HEALTH CARE EDUCATION/TRAINING PROGRAM

## 2024-12-02 NOTE — TELEPHONE ENCOUNTER
----- Message from Radha sent at 12/2/2024 11:28 AM CST -----  Regarding: Lab Order  Good morning, patient needs to reschedule lipid panel for January. She was already checked in for it so will need to place order again. Thank you.

## 2024-12-08 PROBLEM — M81.8 OTHER OSTEOPOROSIS WITHOUT CURRENT PATHOLOGICAL FRACTURE: Status: ACTIVE | Noted: 2024-12-08

## 2024-12-09 NOTE — PROGRESS NOTES
Subjective:       Patient ID: Dena Stanton is a 59 y.o. female.    Chief Complaint: 6 week f/u    HPI  59 year old female presents for follow up. She is known to me and was last seen 10/11/2024. She was able to start Repatha and has tolerated without issue or any flu-like symptoms. She is a current smoker and is not yet ready to reach back out to Vandana Kelly for guidance in cessation. She had side effects with Chantix but maybe was okay with wellbutrin; she is not interested in medicine for this at this time. She is working on and thinking about cutting back. She has seen the OBGYN for pap smear which was negative. She is currently scheduled for follow up with Faye Alves NP in January and we will reschedule her fasting lipid to before that appointment as at that point she will have been on Repatha for ~3 months. Goal LDL will be under 70 in this patient with history of coronary artery disease and familial hypercholesterolemia who is a current smoker. Of note, she does have a diagnosis of osteoporosis and has not wanted to start a medicine so far. She will follow up with me in six months or sooner if needed.  Past Medical History:   Diagnosis Date    Coronary artery disease     GERD (gastroesophageal reflux disease)     Hearing impaired person, bilateral     Hyperlipidemia     Hypertension     Myocardial infarction        Past Surgical History:   Procedure Laterality Date     SECTION      COLONOSCOPY N/A 2024    Procedure: COLONOSCOPY;  Surgeon: Tereso Snell MD;  Location: [facility];  Service: Endoscopy;  Laterality: N/A;  ppm    TYMPANOPLASTY         Review of patient's allergies indicates:   Allergen Reactions    Chantix [varenicline] Other (See Comments)     Mood changes       Social History     Socioeconomic History    Marital status: Significant Other    Number of children: 2   Tobacco Use    Smoking status: Every Day     Current packs/day: 0.50     Average packs/day: 0.5  packs/day for 42.0 years (21.0 ttl pk-yrs)     Types: Cigarettes     Start date: 12/13/1982    Smokeless tobacco: Never   Substance and Sexual Activity    Alcohol use: Not Currently     Alcohol/week: 2.0 standard drinks of alcohol     Types: 2 Shots of liquor per week     Comment: two daquiris a day    Drug use: No    Sexual activity: Yes     Partners: Male     Comment: long term     Social Drivers of Health     Financial Resource Strain: Patient Declined (6/10/2024)    Overall Financial Resource Strain (CARDIA)     Difficulty of Paying Living Expenses: Patient declined   Food Insecurity: Patient Declined (6/10/2024)    Hunger Vital Sign     Worried About Running Out of Food in the Last Year: Patient declined     Ran Out of Food in the Last Year: Patient declined   Transportation Needs: Patient Declined (5/7/2024)    PRAPARE - Transportation     Lack of Transportation (Medical): Patient declined     Lack of Transportation (Non-Medical): Patient declined   Physical Activity: Inactive (6/10/2024)    Exercise Vital Sign     Days of Exercise per Week: 0 days     Minutes of Exercise per Session: 0 min   Stress: No Stress Concern Present (6/10/2024)    [employer] - Occupational Stress Questionnaire     Feeling of Stress : Not at all   Housing Stability: Unknown (6/10/2024)    Housing Stability Vital Sign     Unable to Pay for Housing in the Last Year: Patient declined       Current Outpatient Medications on File Prior to Visit   Medication Sig Dispense Refill    amLODIPine (NORVASC) 5 MG tablet Take one tablet by mouth once a day. 90 tablet 0    ASPIRIN/SALICYLAMIDE/CAFFEINE (ARTHRITIS STRENGTH BC POWDER ORAL) Take 1 tablet by mouth once daily.      atorvastatin (LIPITOR) 80 MG tablet Take one tablet by mouth every pm. 90 tablet 0    coenzyme Q10 200 mg capsule Take 400 mg by mouth once daily. 90 capsule 0    estradioL (ESTRACE) 0.01 % (0.1 mg/gram) vaginal cream Place 1 g vaginally twice a  week. 90 g 3    evolocumab (REPATHA SURECLICK) 140 mg/mL PnIj Inject 1 mL (140 mg total) into the skin every 14 (fourteen) days. 2 mL 12    ezetimibe (ZETIA) 10 mg tablet Take 1 tablet (10 mg total) by mouth once daily. 90 tablet 3    icosapent ethyL (VASCEPA) 1 gram Cap Take 2 capsules (2 g total) by mouth 2 (two) times daily. 60 capsule 2    metoprolol succinate (TOPROL-XL) 25 MG 24 hr tablet TAKE 1 TABLET DAILY 90 tablet 0    multivitamin (THERAGRAN) per tablet Take 1 tablet by mouth once daily.      omeprazole (PRILOSEC) 20 MG capsule Take 20 mg by mouth once daily.       No current facility-administered medications on file prior to visit.       Family History   Problem Relation Name Age of Onset    Cancer Mother      Lung cancer Mother      Suicide Father      Myasthenia gravis Sister         Review of Systems      Objective:      /70   Pulse 82   Ht 5' (1.524 m)   Wt 48.7 kg (107 lb 5.8 oz)   SpO2 95%   BMI 20.97 kg/m²   Physical Exam    Assessment:       1. Primary hypertension    2. Myocardial infarct, old    3. Familial hypercholesterolemia    4. Tobacco dependence due to cigarettes    5. Other osteoporosis without current pathological fracture        Plan:       Primary hypertension  - At goal on amlodipine 5 mg daily and metoprolol succinate 25 mg daily.    Myocardial infarct, old  - Successfully started Repatha. Recheck fasting lipids in 3 months for goal LDL <70.  - On atorvastatin 80 mg, ezetimibe 10 mg.  - Not able to fill vascepa/omega 3 fatty acids.    Familial hypercholesterolemia  -     LIPID PANEL; Future; Expected date: 01/02/2025    Tobacco dependence due to cigarettes  - Cessation encouraged. No quit date set at this time.    Other osteoporosis without current pathological fracture  - Continue discussion of medications to decrease risk of major fracture and hip fracture.      Counseled on regular exercise, maintenance of a healthy weight, balanced diet rich in fruits/vegetables and  lean protein, and avoidance of unhealthy habits like smoking and excessive alcohol intake.    Follow up as scheduled with Faye Alves NP in January and complete fasting lipids before that appointment.  Follow up with me in six months or sooner if needed.

## 2025-01-10 ENCOUNTER — LAB VISIT (OUTPATIENT)
Dept: LAB | Facility: HOSPITAL | Age: 60
End: 2025-01-10
Attending: STUDENT IN AN ORGANIZED HEALTH CARE EDUCATION/TRAINING PROGRAM
Payer: COMMERCIAL

## 2025-01-10 DIAGNOSIS — E78.01 FAMILIAL HYPERCHOLESTEROLEMIA: ICD-10-CM

## 2025-01-10 LAB
CHOLEST SERPL-MCNC: 102 MG/DL (ref 120–199)
CHOLEST/HDLC SERPL: 1.9 {RATIO} (ref 2–5)
HDLC SERPL-MCNC: 53 MG/DL (ref 40–75)
HDLC SERPL: 52 % (ref 20–50)
LDLC SERPL CALC-MCNC: 12.8 MG/DL (ref 63–159)
NONHDLC SERPL-MCNC: 49 MG/DL
TRIGL SERPL-MCNC: 181 MG/DL (ref 30–150)

## 2025-01-10 PROCEDURE — 80061 LIPID PANEL: CPT | Performed by: STUDENT IN AN ORGANIZED HEALTH CARE EDUCATION/TRAINING PROGRAM

## 2025-01-10 PROCEDURE — 36415 COLL VENOUS BLD VENIPUNCTURE: CPT | Mod: PO | Performed by: STUDENT IN AN ORGANIZED HEALTH CARE EDUCATION/TRAINING PROGRAM

## 2025-01-13 ENCOUNTER — LAB VISIT (OUTPATIENT)
Dept: LAB | Facility: HOSPITAL | Age: 60
End: 2025-01-13
Attending: STUDENT IN AN ORGANIZED HEALTH CARE EDUCATION/TRAINING PROGRAM
Payer: COMMERCIAL

## 2025-01-13 ENCOUNTER — OFFICE VISIT (OUTPATIENT)
Dept: FAMILY MEDICINE | Facility: CLINIC | Age: 60
End: 2025-01-13
Payer: COMMERCIAL

## 2025-01-13 VITALS
BODY MASS INDEX: 20.82 KG/M2 | OXYGEN SATURATION: 98 % | SYSTOLIC BLOOD PRESSURE: 124 MMHG | DIASTOLIC BLOOD PRESSURE: 78 MMHG | WEIGHT: 106.06 LBS | HEIGHT: 60 IN | HEART RATE: 87 BPM

## 2025-01-13 DIAGNOSIS — R73.9 HYPERGLYCEMIA: ICD-10-CM

## 2025-01-13 DIAGNOSIS — E78.01 FAMILIAL HYPERCHOLESTEROLEMIA: ICD-10-CM

## 2025-01-13 DIAGNOSIS — R74.01 TRANSAMINITIS: ICD-10-CM

## 2025-01-13 DIAGNOSIS — I10 PRIMARY HYPERTENSION: Primary | ICD-10-CM

## 2025-01-13 LAB
ALBUMIN SERPL BCP-MCNC: 3.8 G/DL (ref 3.5–5.2)
ALP SERPL-CCNC: 73 U/L (ref 40–150)
ALT SERPL W/O P-5'-P-CCNC: 26 U/L (ref 10–44)
ANION GAP SERPL CALC-SCNC: 12 MMOL/L (ref 8–16)
AST SERPL-CCNC: 37 U/L (ref 10–40)
BILIRUB SERPL-MCNC: 0.3 MG/DL (ref 0.1–1)
BUN SERPL-MCNC: 12 MG/DL (ref 6–20)
CALCIUM SERPL-MCNC: 9.2 MG/DL (ref 8.7–10.5)
CHLORIDE SERPL-SCNC: 104 MMOL/L (ref 95–110)
CO2 SERPL-SCNC: 24 MMOL/L (ref 23–29)
CREAT SERPL-MCNC: 0.6 MG/DL (ref 0.5–1.4)
EST. GFR  (NO RACE VARIABLE): >60 ML/MIN/1.73 M^2
ESTIMATED AVG GLUCOSE: 103 MG/DL (ref 68–131)
GLUCOSE SERPL-MCNC: 92 MG/DL (ref 70–110)
HBA1C MFR BLD: 5.2 % (ref 4–5.6)
POTASSIUM SERPL-SCNC: 3.9 MMOL/L (ref 3.5–5.1)
PROT SERPL-MCNC: 7.6 G/DL (ref 6–8.4)
SODIUM SERPL-SCNC: 140 MMOL/L (ref 136–145)

## 2025-01-13 PROCEDURE — 3074F SYST BP LT 130 MM HG: CPT | Mod: CPTII,S$GLB,, | Performed by: NURSE PRACTITIONER

## 2025-01-13 PROCEDURE — 80053 COMPREHEN METABOLIC PANEL: CPT | Performed by: NURSE PRACTITIONER

## 2025-01-13 PROCEDURE — 99999 PR PBB SHADOW E&M-EST. PATIENT-LVL III: CPT | Mod: PBBFAC,,, | Performed by: NURSE PRACTITIONER

## 2025-01-13 PROCEDURE — 36415 COLL VENOUS BLD VENIPUNCTURE: CPT | Mod: PO | Performed by: NURSE PRACTITIONER

## 2025-01-13 PROCEDURE — 83036 HEMOGLOBIN GLYCOSYLATED A1C: CPT | Performed by: NURSE PRACTITIONER

## 2025-01-13 PROCEDURE — 3008F BODY MASS INDEX DOCD: CPT | Mod: CPTII,S$GLB,, | Performed by: NURSE PRACTITIONER

## 2025-01-13 PROCEDURE — 1159F MED LIST DOCD IN RCRD: CPT | Mod: CPTII,S$GLB,, | Performed by: NURSE PRACTITIONER

## 2025-01-13 PROCEDURE — 3078F DIAST BP <80 MM HG: CPT | Mod: CPTII,S$GLB,, | Performed by: NURSE PRACTITIONER

## 2025-01-13 PROCEDURE — 99214 OFFICE O/P EST MOD 30 MIN: CPT | Mod: S$GLB,,, | Performed by: NURSE PRACTITIONER

## 2025-01-13 NOTE — PROGRESS NOTES
Subjective     Patient ID: Dena Stanton is a 59 y.o. female.    Chief Complaint: Follow-up      Follow-up  Pertinent negatives include no chest pain, coughing, fatigue, fever or headaches.         Patient is new to me. PCP is Dr. Jacinto.    60 y/o F with HTN, MI, HLD, Vit D deficiency, Osteoporosis, Smoking presents to clinic for 3 mo followup. She was started on Repatha about 3 mo ago. LDL is 12.8, total cholesterol 102. On atorvastatin 80 and zetia 10 as well. BP well controlled.    Review of Systems   Constitutional:  Negative for fatigue and fever.   Respiratory:  Negative for cough and wheezing.    Cardiovascular:  Negative for chest pain, palpitations and leg swelling.   Neurological:  Negative for dizziness, light-headedness and headaches.   All other systems reviewed and are negative.         Objective   Vitals:    01/13/25 1113   BP: 124/78   Pulse: 87   SpO2: 98%   Weight: 48.1 kg (106 lb 0.7 oz)   Height: 5' (1.524 m)      Physical Exam  Constitutional:       General: She is not in acute distress.     Appearance: Normal appearance. She is normal weight. She is not ill-appearing, toxic-appearing or diaphoretic.   HENT:      Head: Normocephalic and atraumatic.   Cardiovascular:      Rate and Rhythm: Normal rate and regular rhythm.      Heart sounds: Normal heart sounds. No murmur heard.     No friction rub. No gallop.   Pulmonary:      Effort: No respiratory distress.      Breath sounds: Normal breath sounds. No stridor. No wheezing, rhonchi or rales.   Chest:      Chest wall: No tenderness.   Musculoskeletal:      Right lower leg: No edema.      Left lower leg: No edema.   Neurological:      General: No focal deficit present.      Mental Status: She is alert and oriented to person, place, and time. Mental status is at baseline.   Psychiatric:         Mood and Affect: Mood normal.         Behavior: Behavior normal.         Thought Content: Thought content normal.         Judgment: Judgment normal.          1. Primary hypertension  Controlled. Cont Amlodipine, Metoprolol    2. Hyperglycemia  - HEMOGLOBIN A1C; Future  -elevated trigs and glucose on last labs, screen for diabetes    3. Familial hypercholesterolemia  -Controlled, stop zetia, continue repatha and atorvastatin.    4. Transaminitis  - COMPREHENSIVE METABOLIC PANEL; Future   -Likely fatty liver, previously with severely elevated cholesterol. Recheck LFTs- monitor.    RTC/ER precautions given. F/U 3 mo.     Counseled on regular exercise, maintenance of a healthy weight, balanced diet rich in fruits/vegetables and lean protein, and avoidance of unhealthy habits like smoking and excessive alcohol intake.    Faye Alves, AMARILISP-C

## 2025-02-06 ENCOUNTER — HOSPITAL ENCOUNTER (OUTPATIENT)
Dept: RADIOLOGY | Facility: HOSPITAL | Age: 60
Discharge: HOME | End: 2025-02-06
Attending: STUDENT IN AN ORGANIZED HEALTH CARE EDUCATION/TRAINING PROGRAM
Payer: COMMERCIAL

## 2025-02-06 DIAGNOSIS — R92.8 ABNORMAL MAMMOGRAM OF LEFT BREAST: ICD-10-CM

## 2025-02-06 PROCEDURE — 77065 DX MAMMO INCL CAD UNI: CPT | Mod: TC,PO,LT

## 2025-02-06 PROCEDURE — 77061 BREAST TOMOSYNTHESIS UNI: CPT | Mod: 26,LT,, | Performed by: RADIOLOGY

## 2025-02-06 PROCEDURE — 77065 DX MAMMO INCL CAD UNI: CPT | Mod: 26,LT,, | Performed by: RADIOLOGY

## 2025-02-27 ENCOUNTER — TELEPHONE (OUTPATIENT)
Dept: OTOLARYNGOLOGY | Facility: CLINIC | Age: 60
End: 2025-02-27
Payer: COMMERCIAL

## 2025-02-27 ENCOUNTER — OFFICE VISIT (OUTPATIENT)
Dept: OTOLARYNGOLOGY | Facility: CLINIC | Age: 60
End: 2025-02-27
Payer: COMMERCIAL

## 2025-02-27 VITALS — RESPIRATION RATE: 16 BRPM | HEIGHT: 60 IN | BODY MASS INDEX: 20.74 KG/M2 | WEIGHT: 105.63 LBS

## 2025-02-27 DIAGNOSIS — B36.9 ACUTE MYCOTIC OTITIS EXTERNA: Primary | ICD-10-CM

## 2025-02-27 DIAGNOSIS — H61.22 IMPACTED CERUMEN OF LEFT EAR: Primary | ICD-10-CM

## 2025-02-27 DIAGNOSIS — H62.40 ACUTE MYCOTIC OTITIS EXTERNA: ICD-10-CM

## 2025-02-27 DIAGNOSIS — H66.011 NON-RECURRENT ACUTE SUPPURATIVE OTITIS MEDIA OF RIGHT EAR WITH SPONTANEOUS RUPTURE OF TYMPANIC MEMBRANE: ICD-10-CM

## 2025-02-27 DIAGNOSIS — B36.9 ACUTE MYCOTIC OTITIS EXTERNA: ICD-10-CM

## 2025-02-27 DIAGNOSIS — H62.40 ACUTE MYCOTIC OTITIS EXTERNA: Primary | ICD-10-CM

## 2025-02-27 PROCEDURE — 99213 OFFICE O/P EST LOW 20 MIN: CPT | Mod: 25,S$GLB,, | Performed by: PHYSICIAN ASSISTANT

## 2025-02-27 PROCEDURE — 3008F BODY MASS INDEX DOCD: CPT | Mod: CPTII,S$GLB,, | Performed by: PHYSICIAN ASSISTANT

## 2025-02-27 PROCEDURE — 99999 PR PBB SHADOW E&M-EST. PATIENT-LVL IV: CPT | Mod: PBBFAC,,, | Performed by: PHYSICIAN ASSISTANT

## 2025-02-27 PROCEDURE — 1159F MED LIST DOCD IN RCRD: CPT | Mod: CPTII,S$GLB,, | Performed by: PHYSICIAN ASSISTANT

## 2025-02-27 PROCEDURE — 1160F RVW MEDS BY RX/DR IN RCRD: CPT | Mod: CPTII,S$GLB,, | Performed by: PHYSICIAN ASSISTANT

## 2025-02-27 PROCEDURE — 3044F HG A1C LEVEL LT 7.0%: CPT | Mod: CPTII,S$GLB,, | Performed by: PHYSICIAN ASSISTANT

## 2025-02-27 PROCEDURE — 69210 REMOVE IMPACTED EAR WAX UNI: CPT | Mod: S$GLB,,, | Performed by: PHYSICIAN ASSISTANT

## 2025-02-27 RX ORDER — AMOXICILLIN AND CLAVULANATE POTASSIUM 875; 125 MG/1; MG/1
1 TABLET, FILM COATED ORAL EVERY 12 HOURS
Qty: 20 TABLET | Refills: 0 | Status: SHIPPED | OUTPATIENT
Start: 2025-02-27 | End: 2025-03-09

## 2025-02-27 RX ORDER — CLOTRIMAZOLE 1 G/ML
SOLUTION TOPICAL
Qty: 10 ML | Refills: 0 | Status: SHIPPED | OUTPATIENT
Start: 2025-02-27

## 2025-02-27 NOTE — PATIENT INSTRUCTIONS
Disp Refills Start End SAUL   amoxicillin-clavulanate 875-125mg (AUGMENTIN) 875-125 mg per tablet 20 tablet 0 2/27/2025 3/9/2025 --   Sig - Route: Take 1 tablet by mouth every 12 (twelve) hours. for 10 days - Oral     clotrimazole (LOTRIMIN) 1 % Soln 10 mL 0 2/27/2025 -- No   Sig: Place 5 drops into right ear canal twice a day for 10 days.       Keep right ear dry. Use ear plugs only when showering/bathing. Otherwise, let ear air out. If ear gets water in it, then use hairdryer low setting no heat for around 30 seconds to dry ear.      Follow up in 2-3 weeks.

## 2025-02-27 NOTE — PROCEDURES
Ear Cerumen Removal    Date/Time: 2/27/2025 2:00 PM    Performed by: Jeffrey Matute PA-C  Authorized by: Jeffrey Matute PA-C      Local anesthetic:  None  Location details:  Left ear  Procedure type: curette    Procedure type comment:  Curette and suction  Cerumen  Removal Results:  Cerumen completely removed  Patient tolerance:  Patient tolerated the procedure well with no immediate complications

## 2025-02-27 NOTE — PROGRESS NOTES
[facility]    Subjective:      Patient: Dena Stanton Patient PCP: Tri Jacinto DO         :  1965     Sex:  female      MRN:  [id number]          Date of Visit: 2025      Chief Complaint: Cerumen impaction, left/right mycotic OE with suppurative OM    Interval History 2025: Pt presents to clinic for routine 6 month ear cleaning. She denies fever/chills or ear pain/discharge. She has h/o bilateral hearing loss and wears hearing aids bilaterally.     Interval History 2024: Pt presents to clinic for routine 6 month ear cleaning. Pt continues to wear bilateral hearing aids through [facility] for hearing loss. She denies ear pain/discharge.     Interval History 2024: Pt presents to office for routine 6 month ear cleaning. Pt continues to wear bilateral hearing aids through [facility] for hearing loss. Pt is not having ear pain/discharge or fever/chills.      Interval History 2023: Pt goes to [facility] for hearing aids. Pt has bilateral cerumen impaction and requires cleaning. No fever/chills or ear pain/discharge.     Interval History 2022: Pt had bilateral cerumen removal at last visit and had right sided hemotympanum. Pt states that she has decreased her use of BC goody powder. Pt states that her right ear has been doing fine. Pt denies fever/chills, ear pain/discharge or further hearing loss.    Patient ID 2022: Dena Stanton is a 59 y.o. female who was self-referred for  cerumen impaction/neck cyst . Pt states that she has noticed wax in ears. Pt has history of hearing loss and has hearing aid through outside dispenser. Pt has had left neck cyst for the past year, which she states has been stable in size, but has recently enlarged in the last few weeks. Pt denies purulent drainage or signs of infection from neck cyst. Pt denies fever/chills, ear pain or discharge. Pt denies fever/chills, drenching night sweats or unintentional weight loss.     Past Medical  History  She has a past medical history of Coronary artery disease, GERD (gastroesophageal reflux disease), Hearing impaired person, bilateral, Hyperlipidemia, Hypertension, and Myocardial infarction.    Family History  Her family history includes Cancer in her mother; Lung cancer in her mother; Myasthenia gravis in her sister; Suicide in her father.    Past Surgical History:   Procedure Laterality Date     SECTION      COLONOSCOPY N/A 2024    Procedure: COLONOSCOPY;  Surgeon: Tereso Snell MD;  Location: [facility];  Service: Endoscopy;  Laterality: N/A;  ppm    TYMPANOPLASTY       Social History     Tobacco Use    Smoking status: Every Day     Current packs/day: 0.50     Average packs/day: 0.5 packs/day for 42.2 years (21.1 ttl pk-yrs)     Types: Cigarettes     Start date: 1982    Smokeless tobacco: Never   Substance and Sexual Activity    Alcohol use: Not Currently     Alcohol/week: 2.0 standard drinks of alcohol     Types: 2 Shots of liquor per week     Comment: two daquiris a day    Drug use: No    Sexual activity: Yes     Partners: Male     Comment: long term     Medications  She has a current medication list which includes the following prescription(s): amlodipine, aspirin/salicylamide/caffeine, atorvastatin, coenzyme q10, estradiol, evolocumab, metoprolol succinate, multivitamin, omeprazole, amoxicillin-clavulanate 875-125mg, and clotrimazole.    Review of patient's allergies indicates:   Allergen Reactions    Chantix [varenicline] Other (See Comments)     Mood changes     All medications, allergies, and past history have been reviewed.    Objective:      Vitals:      2024    10:36 AM 2025    11:13 AM 2025     2:03 PM   Vitals - 1 value per visit   SYSTOLIC 108 124    DIASTOLIC 70 78    Pulse 82 87    Resp   16   SPO2 95 % 98 %    Weight (lb) 107.36 106.04 105.6   Weight (kg) 48.7 48.1 47.9   Height 5' (1.524 m) 5' (1.524 m) 5' (1.524 m)   BMI (Calculated) 21 20.7 20.6    Pain Score Zero Zero Zero       Body surface area is 1.42 meters squared.    Physical Exam  Constitutional:       General: She is not in acute distress.     Appearance: Normal appearance. She is not ill-appearing.   HENT:      Head: Normocephalic and atraumatic.      Right Ear: Ear canal and external ear normal. Drainage (white fungal mat over right TM) present. A middle ear effusion (suppurative) is present. There is impacted cerumen (partial). Tympanic membrane is scarred (with monomeric changes) and perforated (small superior TM perforation).      Left Ear: Ear canal and external ear normal. There is impacted cerumen. Tympanic membrane is scarred (with monomeric changes).      Nose: Nose normal.      Mouth/Throat:      Lips: Pink. No lesions.      Mouth: Mucous membranes are moist. No oral lesions.      Tongue: No lesions.      Palate: No lesions.      Pharynx: Oropharynx is clear. Uvula midline. No pharyngeal swelling, oropharyngeal exudate, posterior oropharyngeal erythema or uvula swelling.   Eyes:      General:         Right eye: No discharge.         Left eye: No discharge.      Extraocular Movements: Extraocular movements intact.      Conjunctiva/sclera: Conjunctivae normal.   Pulmonary:      Effort: Pulmonary effort is normal.   Neurological:      General: No focal deficit present.      Mental Status: She is alert and oriented to person, place, and time. Mental status is at baseline.   Psychiatric:         Mood and Affect: Mood normal.         Behavior: Behavior normal.         Thought Content: Thought content normal.         Judgment: Judgment normal.       Labs:  WBC   Date Value Ref Range Status   07/02/2024 6.58 3.90 - 12.70 K/uL Final     Platelets   Date Value Ref Range Status   07/02/2024 256 150 - 450 K/uL Final     Creatinine   Date Value Ref Range Status   01/13/2025 0.6 0.5 - 1.4 mg/dL Final     TSH   Date Value Ref Range Status   05/02/2024 2.897 0.400 - 4.000 uIU/mL Final     Glucose   Date  Value Ref Range Status   01/13/2025 92 70 - 110 mg/dL Final     Hemoglobin A1C   Date Value Ref Range Status   01/13/2025 5.2 4.0 - 5.6 % Final     Comment:     ADA Screening Guidelines:  5.7-6.4%  Consistent with prediabetes  >or=6.5%  Consistent with diabetes    High levels of fetal hemoglobin interfere with the HbA1C  assay. Heterozygous hemoglobin variants (HbS, HgC, etc)do  not significantly interfere with this assay.   However, presence of multiple variants may affect accuracy.       All lab results and imaging results have been reviewed.    Assessment:       ICD-10-CM ICD-9-CM   1. Impacted cerumen of left ear  H61.22 380.4   2. Acute mycotic otitis externa  B36.9 111.8    H62.40 380.15   3. Non-recurrent acute suppurative otitis media of right ear with spontaneous rupture of tympanic membrane  H66.011 382.01     Plan:      Impacted cerumen of left ear  -     Ear Cerumen Removal: Left ear cleaning performed today in office.     Acute mycotic otitis externa  -     clotrimazole (LOTRIMIN) 1 % Soln; Place 5 drops into right ear canal twice a day for 10 days.  Dispense: 10 mL; Refill: 0    Non-recurrent acute suppurative otitis media of right ear with spontaneous rupture of tympanic membrane  -     amoxicillin-clavulanate 875-125mg (AUGMENTIN) 875-125 mg per tablet; Take 1 tablet by mouth every 12 (twelve) hours. for 10 days  Dispense: 20 tablet; Refill: 0      Keep right ear dry. Use ear plugs only when showering/bathing. Otherwise, let ear air out. If ear gets water in it, then use hairdryer low setting no heat for around 30 seconds to dry ear.      Follow up in 2-3 weeks.

## 2025-02-27 NOTE — TELEPHONE ENCOUNTER
----- Message from Nora sent at 2/27/2025  2:54 PM CST -----  Contact: self  Type:  Needs Medical AdviceWho Called: patientWould the patient rather a call back or a response via [facility]? callBest Call Back Number: [phone] Additional Information: pt called stating ear drop rx not covered under insurance. Please call

## 2025-02-28 ENCOUNTER — TELEPHONE (OUTPATIENT)
Dept: OTOLARYNGOLOGY | Facility: CLINIC | Age: 60
End: 2025-02-28
Payer: COMMERCIAL

## 2025-02-28 NOTE — TELEPHONE ENCOUNTER
Spoke w/pt per call back msg received.  Pt stated that her insurance will not cover ear drops and she would have to pay out-of-pocket for them; states that cost is approx. $65.  Apologized to pt for inconvenience; stated to pt that I did discuss matter w/provider and advised that there isn't a good alternative to the ear drops unfortunately.  Pt verbalized understanding; stated that she will go ahead and pay for the cost, but did state that she kind of figured that she had to pay for them.      Apologized to pt for inconvenience.  Pt has no further ENT questions/concerns at this time.

## 2025-02-28 NOTE — TELEPHONE ENCOUNTER
Authorization for release of medical records was faxed to [facility] for last 5 audiograms.  Obtained fax confirmation via email.

## 2025-03-05 DIAGNOSIS — I10 ESSENTIAL HYPERTENSION: ICD-10-CM

## 2025-03-05 NOTE — TELEPHONE ENCOUNTER
[facility] Pharmacist states they have sent amlodipine and metoprolol rx refills to Dr. Jacinto's office without response.  Assured her that provider will be notified of refill request.

## 2025-03-05 NOTE — TELEPHONE ENCOUNTER
----- Message from Mily sent at 3/5/2025  4:52 PM CST -----  Contact: self  Type:  Needs Medical AdviceWho Called: [facility] - [place], [place] - [address] [facility] [id number] [place] [address]Owgrp: [phone] Fax: [phone] (please be specific):  How long has patient had these symptoms:  Pharmacy name and phone #:  Would the patient rather a call back or a response via [facility]? Bhavin Call Back Number: [phone] Information: pharmacy has been faxing over refill for her meds but has not her back from them.   Please call back to advise. Thanks!

## 2025-03-06 RX ORDER — AMLODIPINE BESYLATE 5 MG/1
TABLET ORAL
Qty: 90 TABLET | Refills: 0 | Status: SHIPPED | OUTPATIENT
Start: 2025-03-06

## 2025-03-06 RX ORDER — METOPROLOL SUCCINATE 25 MG/1
TABLET, EXTENDED RELEASE ORAL
Qty: 90 TABLET | Refills: 0 | Status: SHIPPED | OUTPATIENT
Start: 2025-03-06

## 2025-03-06 NOTE — TELEPHONE ENCOUNTER
----- Message from Ember sent at 3/6/2025 11:10 AM CST -----  Type:  Needs Medical AdviceWho Called: [facility] [place] (please be specific): na How long has patient had these symptoms:  naPharmacy name and phone #:  [facility] [id number] [address] [facility] [id number] [facility] [address] [address]Hzkyv: [phone] Fax: [phone] the patient rather a call back or a response via [facility]? Call backBest Call Back Number: Jasmin - [phone] Information: pt is out of meds and has been for some time. Pharm is calling and says she has sent escribes and manually faxed to office for these prescriptions, amLODIPine (NORVASC) 5 MG tabletmetoprolol succinate (TOPROL-XL) 25 MG 24 hr tablet. Pharm needs call back asap.   Please call back to advise. Thanks!

## 2025-03-06 NOTE — TELEPHONE ENCOUNTER
----- Message from Ember sent at 3/6/2025 11:10 AM CST -----  Type:  Needs Medical AdviceWho Called: [facility] [place] (please be specific): na How long has patient had these symptoms:  naPharmacy name and phone #:  [facility] - [facility] [address] [facility] [id number] [facility] [address] [address]Zjtil: [phone] Fax: [phone] the patient rather a call back or a response via [facility]? Call backBest Call Back Number: Jasmin - [phone] Information: pt is out of meds and has been for some time. Pharm is calling and says she has sent escribes and manually faxed to office for these prescriptions, amLODIPine (NORVASC) 5 MG tabletmetoprolol succinate (TOPROL-XL) 25 MG 24 hr tablet. Pharm needs call back asap.   Please call back to advise. Thanks!

## 2025-03-17 ENCOUNTER — OFFICE VISIT (OUTPATIENT)
Dept: OTOLARYNGOLOGY | Facility: CLINIC | Age: 60
End: 2025-03-17
Payer: COMMERCIAL

## 2025-03-17 VITALS — HEIGHT: 60 IN | BODY MASS INDEX: 20.91 KG/M2 | WEIGHT: 106.5 LBS

## 2025-03-17 DIAGNOSIS — H61.23 BILATERAL IMPACTED CERUMEN: Primary | ICD-10-CM

## 2025-03-17 PROCEDURE — 99999 PR PBB SHADOW E&M-EST. PATIENT-LVL III: CPT | Mod: PBBFAC,,, | Performed by: PHYSICIAN ASSISTANT

## 2025-03-17 NOTE — PROCEDURES
Ear Cerumen Removal    Date/Time: 3/17/2025 3:30 PM    Performed by: Jeffrey Matute PA-C  Authorized by: Jeffrey Matute PA-C      Local anesthetic:  None  Location details:  Both ears  Procedure type: curette    Procedure type comment:  Suction  Cerumen  Removal Results:  Cerumen completely removed  Patient tolerance:  Patient tolerated the procedure well with no immediate complications

## 2025-03-17 NOTE — PROGRESS NOTES
[facility]    Subjective:      Patient: Dena Stanton Patient PCP: Tri Jacinto DO         :  1965     Sex:  female      MRN:  [id number]          Date of Visit: 2025      Chief Complaint: Cerumen impaction, right mycotic OE with suppurative OM f/u    Interval History 2025: Pt had right OM and right mycotic OE at last OV treated with augmentin 875mg BID x 10 days and clotrimazole 5 drops into right ear BID x 10 days. Pt states that her hearing has gone back to baseline. She only has occasional twinges of ear pain, but otherwise she is not having significant issues. She denies ear discharge.     Interval History 2025: Pt presents to clinic for routine 6 month ear cleaning. She denies fever/chills or ear pain/discharge. She has h/o bilateral hearing loss and wears hearing aids bilaterally.     Interval History 2024: Pt presents to clinic for routine 6 month ear cleaning. Pt continues to wear bilateral hearing aids through [facility] for hearing loss. She denies ear pain/discharge.     Interval History 2024: Pt presents to office for routine 6 month ear cleaning. Pt continues to wear bilateral hearing aids through [facility] for hearing loss. Pt is not having ear pain/discharge or fever/chills.      Interval History 2023: Pt goes to [facility] for hearing aids. Pt has bilateral cerumen impaction and requires cleaning. No fever/chills or ear pain/discharge.     Interval History 2022: Pt had bilateral cerumen removal at last visit and had right sided hemotympanum. Pt states that she has decreased her use of BC goody powder. Pt states that her right ear has been doing fine. Pt denies fever/chills, ear pain/discharge or further hearing loss.    Patient ID 2022: Dena Stanton is a 59 y.o. female who was self-referred for  cerumen impaction/neck cyst . Pt states that she has noticed wax in ears. Pt has history of hearing loss and has hearing aid through outside  dispenser. Pt has had left neck cyst for the past year, which she states has been stable in size, but has recently enlarged in the last few weeks. Pt denies purulent drainage or signs of infection from neck cyst. Pt denies fever/chills, ear pain or discharge. Pt denies fever/chills, drenching night sweats or unintentional weight loss.     Past Medical History  She has a past medical history of Coronary artery disease, GERD (gastroesophageal reflux disease), Hearing impaired person, bilateral, Hyperlipidemia, Hypertension, and Myocardial infarction.    Family History  Her family history includes Cancer in her mother; Lung cancer in her mother; Myasthenia gravis in her sister; Suicide in her father.    Past Surgical History:   Procedure Laterality Date     SECTION      COLONOSCOPY N/A 2024    Procedure: COLONOSCOPY;  Surgeon: Tereso Snell MD;  Location: [facility];  Service: Endoscopy;  Laterality: N/A;  ppm    TYMPANOPLASTY       Social History     Tobacco Use    Smoking status: Every Day     Current packs/day: 0.50     Average packs/day: 0.5 packs/day for 42.3 years (21.1 ttl pk-yrs)     Types: Cigarettes     Start date: 1982    Smokeless tobacco: Never   Substance and Sexual Activity    Alcohol use: Not Currently     Alcohol/week: 2.0 standard drinks of alcohol     Types: 2 Shots of liquor per week     Comment: two daquiris a day    Drug use: No    Sexual activity: Yes     Partners: Male     Comment: long term     Medications  She has a current medication list which includes the following prescription(s): amlodipine, aspirin/salicylamide/caffeine, atorvastatin, clotrimazole, coenzyme q10, evolocumab, metoprolol succinate, multivitamin, omeprazole, and estradiol.    Review of patient's allergies indicates:   Allergen Reactions    Chantix [varenicline] Other (See Comments)     Mood changes     All medications, allergies, and past history have been reviewed.    Objective:      Vitals:       1/13/2025    11:13 AM 2/27/2025     2:03 PM 3/17/2025     3:36 PM   Vitals - 1 value per visit   SYSTOLIC 124     DIASTOLIC 78     Pulse 87     Resp  16    SPO2 98 %     Weight (lb) 106.04 105.6 106.48   Weight (kg) 48.1 47.9 48.3   Height 5' (1.524 m) 5' (1.524 m) 5' (1.524 m)   BMI (Calculated) 20.7 20.6 20.8   Pain Score Zero Zero Zero       Body surface area is 1.43 meters squared.    Physical Exam  Constitutional:       General: She is not in acute distress.     Appearance: Normal appearance. She is not ill-appearing.   HENT:      Head: Normocephalic and atraumatic.      Right Ear: Ear canal and external ear normal. There is impacted cerumen (partial). Tympanic membrane is scarred (with monomeric changes).      Left Ear: Ear canal and external ear normal. There is impacted cerumen (partial). Tympanic membrane is scarred (with monomeric changes).      Nose: Nose normal.      Mouth/Throat:      Lips: Pink. No lesions.      Mouth: Mucous membranes are moist. No oral lesions.      Tongue: No lesions.      Palate: No lesions.      Pharynx: Oropharynx is clear. Uvula midline. No pharyngeal swelling, oropharyngeal exudate, posterior oropharyngeal erythema or uvula swelling.   Eyes:      General:         Right eye: No discharge.         Left eye: No discharge.      Extraocular Movements: Extraocular movements intact.      Conjunctiva/sclera: Conjunctivae normal.   Pulmonary:      Effort: Pulmonary effort is normal.   Neurological:      General: No focal deficit present.      Mental Status: She is alert and oriented to person, place, and time. Mental status is at baseline.   Psychiatric:         Mood and Affect: Mood normal.         Behavior: Behavior normal.         Thought Content: Thought content normal.         Judgment: Judgment normal.       Ear Cerumen Removal     Date/Time: 3/17/2025 3:30 PM     Performed by: Jeffrey Mattue PA-C  Authorized by: Jeffrey Matute PA-C       Local anesthetic:   None  Location details:  Both ears  Procedure type: curette    Procedure type comment:  Suction  Cerumen  Removal Results:  Cerumen completely removed  Patient tolerance:  Patient tolerated the procedure well with no immediate complications     Labs:  WBC   Date Value Ref Range Status   07/02/2024 6.58 3.90 - 12.70 K/uL Final     Platelets   Date Value Ref Range Status   07/02/2024 256 150 - 450 K/uL Final     Creatinine   Date Value Ref Range Status   01/13/2025 0.6 0.5 - 1.4 mg/dL Final     TSH   Date Value Ref Range Status   05/02/2024 2.897 0.400 - 4.000 uIU/mL Final     Glucose   Date Value Ref Range Status   01/13/2025 92 70 - 110 mg/dL Final     Hemoglobin A1C   Date Value Ref Range Status   01/13/2025 5.2 4.0 - 5.6 % Final     Comment:     ADA Screening Guidelines:  5.7-6.4%  Consistent with prediabetes  >or=6.5%  Consistent with diabetes    High levels of fetal hemoglobin interfere with the HbA1C  assay. Heterozygous hemoglobin variants (HbS, HgC, etc)do  not significantly interfere with this assay.   However, presence of multiple variants may affect accuracy.       All lab results and imaging results have been reviewed.    Assessment:       ICD-10-CM ICD-9-CM   1. Bilateral impacted cerumen  H61.23 380.4     Plan:      OE/OM resolved. Bilateral ear cleaning performed today in office. Pt may follow up in 6 months for routine ear cleaning.

## 2025-04-08 DIAGNOSIS — E78.5 HYPERLIPIDEMIA, UNSPECIFIED HYPERLIPIDEMIA TYPE: ICD-10-CM

## 2025-04-09 NOTE — TELEPHONE ENCOUNTER
Refill Routing Note   Medication(s) are not appropriate for processing by [facility] for the following reason(s):        Non-participating provider    [facility] action(s):  Route             Appointments  past 12m or future 3m with PCP    Date Provider   Last Visit   12/2/2024 Tri Jacinto, DO   Next Visit   4/14/2025 Tri Jacinto, DO   ED visits in past 90 days: 0        Note composed:7:28 PM 04/08/2025

## 2025-04-10 RX ORDER — ATORVASTATIN CALCIUM 80 MG/1
TABLET, FILM COATED ORAL
Qty: 90 TABLET | Refills: 0 | Status: SHIPPED | OUTPATIENT
Start: 2025-04-10

## 2025-04-14 ENCOUNTER — OFFICE VISIT (OUTPATIENT)
Dept: FAMILY MEDICINE | Facility: CLINIC | Age: 60
End: 2025-04-14
Payer: COMMERCIAL

## 2025-04-14 VITALS
BODY MASS INDEX: 20.56 KG/M2 | SYSTOLIC BLOOD PRESSURE: 113 MMHG | HEART RATE: 78 BPM | OXYGEN SATURATION: 95 % | WEIGHT: 104.75 LBS | HEIGHT: 60 IN | DIASTOLIC BLOOD PRESSURE: 68 MMHG

## 2025-04-14 DIAGNOSIS — M81.8 OTHER OSTEOPOROSIS WITHOUT CURRENT PATHOLOGICAL FRACTURE: ICD-10-CM

## 2025-04-14 DIAGNOSIS — R91.1 SOLITARY PULMONARY NODULE: ICD-10-CM

## 2025-04-14 DIAGNOSIS — E78.01 FAMILIAL HYPERCHOLESTEROLEMIA: ICD-10-CM

## 2025-04-14 DIAGNOSIS — I10 PRIMARY HYPERTENSION: ICD-10-CM

## 2025-04-14 DIAGNOSIS — F17.210 TOBACCO DEPENDENCE DUE TO CIGARETTES: Primary | ICD-10-CM

## 2025-04-14 PROCEDURE — 3074F SYST BP LT 130 MM HG: CPT | Mod: CPTII,S$GLB,, | Performed by: STUDENT IN AN ORGANIZED HEALTH CARE EDUCATION/TRAINING PROGRAM

## 2025-04-14 PROCEDURE — G2211 COMPLEX E/M VISIT ADD ON: HCPCS | Mod: S$GLB,,, | Performed by: STUDENT IN AN ORGANIZED HEALTH CARE EDUCATION/TRAINING PROGRAM

## 2025-04-14 PROCEDURE — 1160F RVW MEDS BY RX/DR IN RCRD: CPT | Mod: CPTII,S$GLB,, | Performed by: STUDENT IN AN ORGANIZED HEALTH CARE EDUCATION/TRAINING PROGRAM

## 2025-04-14 PROCEDURE — 3078F DIAST BP <80 MM HG: CPT | Mod: CPTII,S$GLB,, | Performed by: STUDENT IN AN ORGANIZED HEALTH CARE EDUCATION/TRAINING PROGRAM

## 2025-04-14 PROCEDURE — 3044F HG A1C LEVEL LT 7.0%: CPT | Mod: CPTII,S$GLB,, | Performed by: STUDENT IN AN ORGANIZED HEALTH CARE EDUCATION/TRAINING PROGRAM

## 2025-04-14 PROCEDURE — 1159F MED LIST DOCD IN RCRD: CPT | Mod: CPTII,S$GLB,, | Performed by: STUDENT IN AN ORGANIZED HEALTH CARE EDUCATION/TRAINING PROGRAM

## 2025-04-14 PROCEDURE — 99214 OFFICE O/P EST MOD 30 MIN: CPT | Mod: S$GLB,,, | Performed by: STUDENT IN AN ORGANIZED HEALTH CARE EDUCATION/TRAINING PROGRAM

## 2025-04-14 PROCEDURE — 3008F BODY MASS INDEX DOCD: CPT | Mod: CPTII,S$GLB,, | Performed by: STUDENT IN AN ORGANIZED HEALTH CARE EDUCATION/TRAINING PROGRAM

## 2025-04-14 PROCEDURE — 99999 PR PBB SHADOW E&M-EST. PATIENT-LVL IV: CPT | Mod: PBBFAC,,, | Performed by: STUDENT IN AN ORGANIZED HEALTH CARE EDUCATION/TRAINING PROGRAM

## 2025-04-14 RX ORDER — AMLODIPINE BESYLATE 5 MG/1
TABLET ORAL
Qty: 90 TABLET | Refills: 0 | Status: SHIPPED | OUTPATIENT
Start: 2025-04-14

## 2025-04-14 RX ORDER — METOPROLOL SUCCINATE 25 MG/1
TABLET, EXTENDED RELEASE ORAL
Qty: 90 TABLET | Refills: 0 | Status: SHIPPED | OUTPATIENT
Start: 2025-04-14

## 2025-04-14 RX ORDER — ATORVASTATIN CALCIUM 80 MG/1
TABLET, FILM COATED ORAL
Qty: 90 TABLET | Refills: 0 | Status: SHIPPED | OUTPATIENT
Start: 2025-04-14

## 2025-04-14 NOTE — PATIENT INSTRUCTIONS
To decrease the risk of breaking bones, We will start oral supplementation with vitamin D (0225-6144 IU daily) and aim to get calcium 8330-7260 mg daily from your diet (or supplements for example calcium 600 mg twice a day). Combined with calcium and vitamin D, weight bearing regular resistance training, and prescription medication can decrease risk of breaking bones.

## 2025-04-14 NOTE — PROGRESS NOTES
Subjective:       Patient ID: Dena Stanton is a 59 y.o. female who presents for follow up. The patient's last visit with me was on 12/2/2024 and she saw Faye Alves 1/13/25 as well.    Chief Complaint: Follow-up (3 month )    History of Present Illness    HPI:  Patient presents for a follow-up visit to discuss recent test results and ongoing health management. Patient recently had an ear infection and fungal growth in her ear, treated with Lotrimin drops prescribed by an ENT specialist. She reports resolution of the condition and attributes the fungal growth to wearing hearing aids.    She is currently taking Repatha injections every other Sunday for cholesterol management, reporting no significant side effects. She denies flu-like symptoms mentioned as potential side effects. She notes slightly more frequent rhinorrhea due to seasonal pollen, clarifying this is not a medication side effect.    She mentions having acid reflux as an ongoing condition.    Regarding bone health, she underwent a bone density scan showing a high risk for hip and back fractures. The test results indicate a diagnosis of osteoporosis with a 10-year risk of breaking a major bone at 28% and a 10-year risk of breaking a hip at 15%. She expresses concern about these results and hesitancy about starting additional medication.    She reports compliance issues with calcium and vitamin D supplements due to difficulty opening the bottle, resulting in missed doses for a few weeks.    MEDICATIONS:  Patient is on Repatha, administered as an injection every other Sunday for cholesterol management. She is also on Atorvastatin, Metoprolol, and Amlodipine. Her regimen includes supplements including CoQ10, a multivitamin, and Calcium with Vitamin D (7823-9078 IU daily). She takes Omeprazole 20 mg. Zetia was discontinued due to no longer needed. She completed a course of Lotrimin drops, prescribed by ENT for ear fungus.    TEST RESULTS:  Patient's  liver enzymes were previously elevated but have been rechecked and are now normal. Her cholesterol levels have been rechecked and show significant improvement. Triglycerides remain elevated. Her A1c (average blood sugar) is good, and her Vitamin D level is normal. A bone density scan conducted in 2024 revealed a high risk for breaking hip and back. Patient was diagnosed with osteoporosis, with a T-score less than -2.5. Her 10-year risk of breaking a major bone is 28%, while the 10-year risk of breaking a hip is 15%.    SOCIAL HISTORY:  Smoking: Current smoker, considers quitting daily, but not yet ready to restart cessation program.         Past Medical History:   Diagnosis Date    Coronary artery disease     GERD (gastroesophageal reflux disease)     Hearing impaired person, bilateral     Hyperlipidemia     Hypertension     Myocardial infarction        Past Surgical History:   Procedure Laterality Date     SECTION      COLONOSCOPY N/A 2024    Procedure: COLONOSCOPY;  Surgeon: Tereso Snell MD;  Location: [facility];  Service: Endoscopy;  Laterality: N/A;  ppm    TYMPANOPLASTY         Review of patient's allergies indicates:   Allergen Reactions    Chantix [varenicline] Other (See Comments)     Mood changes       Social History[1]    Medications Ordered Prior to Encounter[2]    Family History   Problem Relation Name Age of Onset    Cancer Mother      Lung cancer Mother      Suicide Father      Myasthenia gravis Sister         Review of Systems    Objective:      /68   Pulse 78   Ht 5' (1.524 m)   Wt 47.5 kg (104 lb 11.5 oz)   SpO2 95%   BMI 20.45 kg/m²   Physical Exam  Constitutional:       General: She is not in acute distress.  Neurological:      General: No focal deficit present.      Mental Status: She is alert. Mental status is at baseline.   Psychiatric:         Mood and Affect: Mood normal.         Behavior: Behavior normal.         Assessment:           Plan:        Primary hypertension  -     amLODIPine (NORVASC) 5 MG tablet; Take one tablet by mouth once a day.  Dispense: 90 tablet; Refill: 0  -     metoprolol succinate (TOPROL-XL) 25 MG 24 hr tablet; TAKE 1 TABLET DAILY  Dispense: 90 tablet; Refill: 0  - Stable. Continue current regimen - amlodipine 5 mg, Toprol XL daily.    Tobacco dependence due to cigarettes  - Cessation strongly recommended.  - Not ready to quit.  - Will be due to schedule lung cancer screening after June 18th then return for follow up.    Familial hypercholesterolemia  -     atorvastatin (LIPITOR) 80 MG tablet; Take one tablet by mouth every pm.  Dispense: 90 tablet; Refill: 0  - Stable. Continue current regimen with lipitor and Repatha.    Other osteoporosis without current pathological fracture  - Continue calcium and vitamin D daily.  - Combined with calcium and vitamin D, weight bearing regular resistance training, and prescription medication can decrease risk of breaking bones.  - Patient politely declines alendronate, Prolia.    Solitary pulmonary nodule  - Schedule lung cancer screening CT after June 18th and return for follow up.      Counseled on regular exercise, maintenance of a healthy weight, balanced diet rich in fruits/vegetables and lean protein, and avoidance of unhealthy habits like smoking and excessive alcohol intake.    Return after chest CT in June, or sooner if needed.    This note was generated with the assistance of ambient listening technology. Verbal consent was obtained by the patient and accompanying visitor(s) for the recording of patient appointment to facilitate this note. I attest to having reviewed and edited the generated note for accuracy, though some syntax or spelling errors may persist. Please contact the author of this note for any clarification.    Visit today included increased complexity associated with the care of the episodic problem tobacco dependence addressed and managing the longitudinal care of the  patient due to the serious and/or complex managed problem(s) primary hypertension.           [1]   Social History  Socioeconomic History    Marital status: Significant Other    Number of children: 2   Tobacco Use    Smoking status: Every Day     Current packs/day: 0.50     Average packs/day: 0.5 packs/day for 42.3 years (21.2 ttl pk-yrs)     Types: Cigarettes     Start date: 12/13/1982    Smokeless tobacco: Never   Substance and Sexual Activity    Alcohol use: Not Currently     Alcohol/week: 2.0 standard drinks of alcohol     Types: 2 Shots of liquor per week     Comment: two daquiris a day    Drug use: No    Sexual activity: Yes     Partners: Male     Comment: long term     Social Drivers of Health     Financial Resource Strain: Patient Declined (6/10/2024)    Overall Financial Resource Strain (CARDIA)     Difficulty of Paying Living Expenses: Patient declined   Food Insecurity: Patient Declined (6/10/2024)    Hunger Vital Sign     Worried About Running Out of Food in the Last Year: Patient declined     Ran Out of Food in the Last Year: Patient declined   Transportation Needs: Patient Declined (5/7/2024)    PRAPARE - Transportation     Lack of Transportation (Medical): Patient declined     Lack of Transportation (Non-Medical): Patient declined   Physical Activity: Inactive (6/10/2024)    Exercise Vital Sign     Days of Exercise per Week: 0 days     Minutes of Exercise per Session: 0 min   Stress: No Stress Concern Present (6/10/2024)    [employer] [place] [employer] - Occupational Stress Questionnaire     Feeling of Stress : Not at all   Housing Stability: Unknown (6/10/2024)    Housing Stability Vital Sign     Unable to Pay for Housing in the Last Year: Patient declined   [2]   Current Outpatient Medications on File Prior to Visit   Medication Sig Dispense Refill    ASPIRIN/SALICYLAMIDE/CAFFEINE (ARTHRITIS STRENGTH BC POWDER ORAL) Take 1 tablet by mouth once daily.      clotrimazole (LOTRIMIN) 1 % Soln  Place 5 drops into right ear canal twice a day for 10 days. 10 mL 0    coenzyme Q10 200 mg capsule Take 400 mg by mouth once daily. 90 capsule 0    evolocumab (REPATHA SURECLICK) 140 mg/mL PnIj Inject 1 mL (140 mg total) into the skin every 14 (fourteen) days. 2 mL 12    multivitamin (THERAGRAN) per tablet Take 1 tablet by mouth once daily.      omeprazole (PRILOSEC) 20 MG capsule Take 20 mg by mouth once daily.      estradioL (ESTRACE) 0.01 % (0.1 mg/gram) vaginal cream Place 1 g vaginally twice a week. (Patient not taking: Reported on 4/14/2025) 90 g 3     No current facility-administered medications on file prior to visit.

## 2025-04-19 PROBLEM — R91.1 SOLITARY PULMONARY NODULE: Status: ACTIVE | Noted: 2025-04-19

## 2025-06-19 ENCOUNTER — OFFICE VISIT (OUTPATIENT)
Dept: FAMILY MEDICINE | Facility: CLINIC | Age: 60
End: 2025-06-19
Payer: COMMERCIAL

## 2025-06-19 DIAGNOSIS — I10 PRIMARY HYPERTENSION: Primary | ICD-10-CM

## 2025-06-19 DIAGNOSIS — M81.8 OTHER OSTEOPOROSIS WITHOUT CURRENT PATHOLOGICAL FRACTURE: ICD-10-CM

## 2025-06-19 DIAGNOSIS — E78.01 FAMILIAL HYPERCHOLESTEROLEMIA: ICD-10-CM

## 2025-06-19 DIAGNOSIS — R91.1 SOLITARY PULMONARY NODULE: ICD-10-CM

## 2025-06-19 DIAGNOSIS — F17.210 TOBACCO DEPENDENCE DUE TO CIGARETTES: ICD-10-CM

## 2025-06-19 DIAGNOSIS — K21.9 GASTROESOPHAGEAL REFLUX DISEASE WITHOUT ESOPHAGITIS: ICD-10-CM

## 2025-06-19 DIAGNOSIS — L21.9 SEBORRHEIC DERMATITIS: ICD-10-CM

## 2025-06-19 PROBLEM — R91.8 MULTIPLE LUNG NODULES ON CT: Status: RESOLVED | Noted: 2024-10-11 | Resolved: 2025-06-19

## 2025-06-19 PROCEDURE — 1160F RVW MEDS BY RX/DR IN RCRD: CPT | Mod: CPTII,S$GLB,, | Performed by: STUDENT IN AN ORGANIZED HEALTH CARE EDUCATION/TRAINING PROGRAM

## 2025-06-19 PROCEDURE — 3078F DIAST BP <80 MM HG: CPT | Mod: CPTII,S$GLB,, | Performed by: STUDENT IN AN ORGANIZED HEALTH CARE EDUCATION/TRAINING PROGRAM

## 2025-06-19 PROCEDURE — 3044F HG A1C LEVEL LT 7.0%: CPT | Mod: CPTII,S$GLB,, | Performed by: STUDENT IN AN ORGANIZED HEALTH CARE EDUCATION/TRAINING PROGRAM

## 2025-06-19 PROCEDURE — 3074F SYST BP LT 130 MM HG: CPT | Mod: CPTII,S$GLB,, | Performed by: STUDENT IN AN ORGANIZED HEALTH CARE EDUCATION/TRAINING PROGRAM

## 2025-06-19 PROCEDURE — 99214 OFFICE O/P EST MOD 30 MIN: CPT | Mod: S$GLB,,, | Performed by: STUDENT IN AN ORGANIZED HEALTH CARE EDUCATION/TRAINING PROGRAM

## 2025-06-19 PROCEDURE — 3008F BODY MASS INDEX DOCD: CPT | Mod: CPTII,S$GLB,, | Performed by: STUDENT IN AN ORGANIZED HEALTH CARE EDUCATION/TRAINING PROGRAM

## 2025-06-19 PROCEDURE — 99999 PR PBB SHADOW E&M-EST. PATIENT-LVL IV: CPT | Mod: PBBFAC,,, | Performed by: STUDENT IN AN ORGANIZED HEALTH CARE EDUCATION/TRAINING PROGRAM

## 2025-06-19 PROCEDURE — 1159F MED LIST DOCD IN RCRD: CPT | Mod: CPTII,S$GLB,, | Performed by: STUDENT IN AN ORGANIZED HEALTH CARE EDUCATION/TRAINING PROGRAM

## 2025-06-19 PROCEDURE — G2211 COMPLEX E/M VISIT ADD ON: HCPCS | Mod: S$GLB,,, | Performed by: STUDENT IN AN ORGANIZED HEALTH CARE EDUCATION/TRAINING PROGRAM

## 2025-06-19 RX ORDER — CHOLECALCIFEROL (VITAMIN D3) 25 MCG
1000 TABLET ORAL DAILY
COMMUNITY

## 2025-06-19 RX ORDER — SELENIUM SULFIDE 22.5 MG/ML
SHAMPOO TOPICAL
Qty: 180 ML | Refills: 5 | Status: SHIPPED | OUTPATIENT
Start: 2025-06-19

## 2025-06-19 RX ORDER — PANTOPRAZOLE SODIUM 20 MG/1
20 TABLET, DELAYED RELEASE ORAL DAILY
Qty: 90 TABLET | Refills: 3 | Status: SHIPPED | OUTPATIENT
Start: 2025-06-19 | End: 2026-06-19

## 2025-06-19 NOTE — PATIENT INSTRUCTIONS
Download an stacy to help you quit smoking, quitSTART:    https://smokefree.gov/tools-tips/quitstart    Consider Quit smoking Text program:    https://smokefree.gov/tools-tips/text-programs/quit-for-good/smokefreetxt    Text QUIT to 92497

## 2025-06-24 ENCOUNTER — HOSPITAL ENCOUNTER (OUTPATIENT)
Dept: RADIOLOGY | Facility: HOSPITAL | Age: 60
Discharge: HOME | End: 2025-06-24
Attending: STUDENT IN AN ORGANIZED HEALTH CARE EDUCATION/TRAINING PROGRAM
Payer: COMMERCIAL

## 2025-06-24 DIAGNOSIS — R91.1 SOLITARY PULMONARY NODULE: ICD-10-CM

## 2025-06-24 PROCEDURE — 71271 CT THORAX LUNG CANCER SCR C-: CPT | Mod: 26,,, | Performed by: RADIOLOGY

## 2025-06-24 PROCEDURE — 71271 CT THORAX LUNG CANCER SCR C-: CPT | Mod: TC,PO

## 2025-06-25 ENCOUNTER — RESULTS FOLLOW-UP (OUTPATIENT)
Dept: FAMILY MEDICINE | Facility: CLINIC | Age: 60
End: 2025-06-25

## 2025-06-25 DIAGNOSIS — R91.1 SOLITARY PULMONARY NODULE: Primary | ICD-10-CM

## 2025-06-29 VITALS
HEART RATE: 83 BPM | OXYGEN SATURATION: 97 % | WEIGHT: 104.5 LBS | SYSTOLIC BLOOD PRESSURE: 112 MMHG | BODY MASS INDEX: 20.52 KG/M2 | DIASTOLIC BLOOD PRESSURE: 66 MMHG | HEIGHT: 60 IN

## 2025-06-29 PROBLEM — K21.9 GASTROESOPHAGEAL REFLUX DISEASE WITHOUT ESOPHAGITIS: Status: ACTIVE | Noted: 2025-06-29

## 2025-06-29 PROBLEM — L21.9 SEBORRHEIC DERMATITIS: Status: ACTIVE | Noted: 2025-06-29

## 2025-06-29 NOTE — PROGRESS NOTES
Subjective:       Patient ID: Dena Stanton is a 59 y.o. female.    Chief Complaint: Follow-up    Follow-up    59 year old female presents for follow up since last visit with me 25. She continues to smoke, is working on cutting back and not ready for medication or returning to smoking cessation program. She is amenable to low dose chest CT for lung cancer screening - that order is placed. She continues Repatha without issue. She politely declines medication to decrease the risk of fracture for osteoporosis. She has a small area on her hairline that itches - we will try selenium sulfide shampoo. She needs refill of pantoprazole - with rebound symptoms of GERD if she stops. She is taking BC powder daily or more for chronic aches and pains, including in her back - history of compression fractures. I recommend against BC powder and try ibuprofen instead. Eat beforehand. Drinking plenty of water. I recommend taking an aspirin 81 mg daily but no if she is taking BC powder. She will be due for check up and labs after . Of note, we rechecked her spO2 which was 97% on room air with heart rate of 83.    Past Medical History:   Diagnosis Date    Coronary artery disease     GERD (gastroesophageal reflux disease)     Hearing impaired person, bilateral     Hyperlipidemia     Hypertension     Myocardial infarction        Past Surgical History:   Procedure Laterality Date     SECTION      COLONOSCOPY N/A 2024    Procedure: COLONOSCOPY;  Surgeon: Tereso Snell MD;  Location: [facility];  Service: Endoscopy;  Laterality: N/A;  ppm    TYMPANOPLASTY         Review of patient's allergies indicates:   Allergen Reactions    Chantix [varenicline] Other (See Comments)     Mood changes       Social History[1]    Medications Ordered Prior to Encounter[2]    Family History   Problem Relation Name Age of Onset    Cancer Mother      Lung cancer Mother      Suicide Father      Myasthenia gravis Sister          Review of Systems      Objective:      /66   Pulse 83   Ht 5' (1.524 m)   Wt 47.4 kg (104 lb 8 oz)   SpO2 97%   BMI 20.41 kg/m²   Physical Exam  Constitutional:       General: She is not in acute distress.     Appearance: Normal appearance. She is not ill-appearing, toxic-appearing or diaphoretic.   Skin:     General: Skin is warm and dry.      Comments: Forehead, left hairline with small focal area with slight change to texture, no visible rash   Neurological:      General: No focal deficit present.      Mental Status: She is alert. Mental status is at baseline.   Psychiatric:         Mood and Affect: Mood normal.         Behavior: Behavior normal.         Assessment:       1. Primary hypertension    2. Tobacco dependence due to cigarettes    3. Solitary pulmonary nodule    4. Familial hypercholesterolemia    5. Other osteoporosis without current pathological fracture    6. Seborrheic dermatitis    7. Gastroesophageal reflux disease without esophagitis        Plan:       Primary hypertension  - Stable. Continue current regimen - amlodipine 5 , toprol XL 25.    Tobacco dependence due to cigarettes  -     CBC Auto Differential; Future; Expected date: 01/19/2026  -     Comprehensive Metabolic Panel; Future; Expected date: 01/19/2026  - Cessation strongly recommended. Not ready to quit.  - Politely declines medication, referral.    Solitary pulmonary nodule  -     CT Chest Lung Screening Low Dose; Future; Expected date: 06/19/2025  -     CBC Auto Differential; Future; Expected date: 01/19/2026  - Annual lung cancer screening due - completed 6/24/25 with recommendation to repeat in one year. Order is placed.    Familial hypercholesterolemia  -     Lipid Panel; Future; Expected date: 01/19/2026  - Stable. Continue current regimen - Lipitor and Repatha.  - Recommend aspirin 81 mg daily but not if also continuing BC powders.    Other osteoporosis without current pathological fracture  -     Comprehensive  Metabolic Panel; Future; Expected date: 01/19/2026  -     Vitamin D; Future; Expected date: 01/19/2026  - Politely declines medication. Regular weight bearing exercise with resistance 2-3 days per week recommended along with calcium 1,200 mg daily and vitamin D3 1,000-2,000 IU daily.  - History of vertebral compression fractures.    Seborrheic dermatitis  -     selenium sulfide 2.25 % Sham; Apply to scalp three times weekly, let sit for 5 minutes, and then rinse as needed for rash.  Dispense: 180 mL; Refill: 5  - Empiric selenium sulfide. If persistent or worsening symptoms, return for re-evaluation.    Gastroesophageal reflux disease without esophagitis  -     pantoprazole (PROTONIX) 20 MG tablet; Take 1 tablet (20 mg total) by mouth once daily.  Dispense: 90 tablet; Refill: 3  - Recommend discontinue BC powder. Try ibuprofen, eat beforehand. Drink plenty of water.      Counseled on regular exercise, maintenance of a healthy weight, balanced diet rich in fruits/vegetables and lean protein, and avoidance of unhealthy habits like smoking and excessive alcohol intake.    Return for check up after completed fasting labs in January, or sooner if needed.    Visit today included increased complexity associated with the care of the episodic problem seborrheic dermatitis addressed and managing the longitudinal care of the patient due to the serious and/or complex managed problem(s) other osteoporosis without current pathological fracture.         [1]   Social History  Socioeconomic History    Marital status: Significant Other    Number of children: 2   Tobacco Use    Smoking status: Every Day     Current packs/day: 0.50     Average packs/day: 0.5 packs/day for 42.5 years (21.3 ttl pk-yrs)     Types: Cigarettes     Start date: 12/13/1982    Smokeless tobacco: Never   Substance and Sexual Activity    Alcohol use: Not Currently     Alcohol/week: 2.0 standard drinks of alcohol     Types: 2 Shots of liquor per week     Comment:  two daquiris a day    Drug use: No    Sexual activity: Yes     Partners: Male     Comment: long term   Social History Narrative    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     Social Drivers of Health     Financial Resource Strain: Patient Declined (6/10/2024)    Overall Financial Resource Strain (CARDIA)     Difficulty of Paying Living Expenses: Patient declined   Food Insecurity: Patient Declined (6/10/2024)    Hunger Vital Sign     Worried About Running Out of Food in the Last Year: Patient declined     Ran Out of Food in the Last Year: Patient declined   Transportation Needs: Patient Declined (5/7/2024)    PRAPARE - Transportation     Lack of Transportation (Medical): Patient declined     Lack of Transportation (Non-Medical): Patient declined   Physical Activity: Unknown (6/10/2024)    Exercise Vital Sign     Days of Exercise per Week: 0 days   Recent Concern: Physical Activity - Inactive (6/10/2024)    Exercise Vital Sign     Days of Exercise per Week: 0 days     Minutes of Exercise per Session: 0 min   Stress: No Stress Concern Present (6/10/2024)    [employer] [place] [employer] - Occupational Stress Questionnaire     Feeling of Stress : Not at all   Housing Stability: Unknown (6/10/2024)    Housing Stability Vital Sign     Unable to Pay for Housing in the Last Year: Patient declined   [2]   Current Outpatient Medications on File Prior to Visit   Medication Sig Dispense Refill    amLODIPine (NORVASC) 5 MG tablet Take one tablet by mouth once a day. 90 tablet 0    ASPIRIN/SALICYLAMIDE/CAFFEINE (ARTHRITIS STRENGTH BC POWDER ORAL) Take 1 tablet by mouth once daily.      atorvastatin (LIPITOR) 80 MG tablet Take one tablet by mouth every pm. 90 tablet 0    coenzyme Q10 200 mg capsule Take 400 mg by mouth once daily. 90 capsule 0    evolocumab (REPATHA SURECLICK) 140 mg/mL PnIj Inject 1 mL (140 mg total) into the skin every 14 (fourteen) days. 2 mL 12    metoprolol  succinate (TOPROL-XL) 25 MG 24 hr tablet TAKE 1 TABLET DAILY 90 tablet 0    multivitamin (THERAGRAN) per tablet Take 1 tablet by mouth once daily.      vitamin D (VITAMIN D3) 1000 units Tab Take 1,000 Units by mouth once daily.      clotrimazole (LOTRIMIN) 1 % Soln Place 5 drops into right ear canal twice a day for 10 days. 10 mL 0    estradioL (ESTRACE) 0.01 % (0.1 mg/gram) vaginal cream Place 1 g vaginally twice a week. (Patient not taking: Reported on 3/17/2025) 90 g 3     No current facility-administered medications on file prior to visit.

## 2025-08-12 ENCOUNTER — OFFICE VISIT (OUTPATIENT)
Dept: OTOLARYNGOLOGY | Facility: CLINIC | Age: 60
End: 2025-08-12
Payer: COMMERCIAL

## 2025-08-12 VITALS — WEIGHT: 106.06 LBS | HEIGHT: 60 IN | BODY MASS INDEX: 20.82 KG/M2

## 2025-08-12 DIAGNOSIS — Z86.69 HISTORY OF HEARING LOSS: ICD-10-CM

## 2025-08-12 DIAGNOSIS — B36.9 ACUTE MYCOTIC OTITIS EXTERNA: ICD-10-CM

## 2025-08-12 DIAGNOSIS — H61.23 BILATERAL IMPACTED CERUMEN: Primary | ICD-10-CM

## 2025-08-12 DIAGNOSIS — H62.40 ACUTE MYCOTIC OTITIS EXTERNA: ICD-10-CM

## 2025-08-12 DIAGNOSIS — Z97.4 WEARS HEARING AID IN BOTH EARS: ICD-10-CM

## 2025-08-12 PROCEDURE — 99999 PR PBB SHADOW E&M-EST. PATIENT-LVL III: CPT | Mod: PBBFAC,,, | Performed by: PHYSICIAN ASSISTANT
